# Patient Record
Sex: FEMALE | Race: WHITE | NOT HISPANIC OR LATINO | Employment: OTHER | URBAN - NONMETROPOLITAN AREA
[De-identification: names, ages, dates, MRNs, and addresses within clinical notes are randomized per-mention and may not be internally consistent; named-entity substitution may affect disease eponyms.]

---

## 2017-05-19 ENCOUNTER — TRANSCRIBE ORDERS (OUTPATIENT)
Dept: ADMINISTRATIVE | Facility: HOSPITAL | Age: 62
End: 2017-05-19

## 2017-05-19 ENCOUNTER — HOSPITAL ENCOUNTER (OUTPATIENT)
Dept: GENERAL RADIOLOGY | Facility: HOSPITAL | Age: 62
Discharge: HOME | End: 2017-05-19
Admitting: NURSE PRACTITIONER

## 2017-05-19 DIAGNOSIS — R05.9 COUGH: ICD-10-CM

## 2017-05-19 DIAGNOSIS — R05.9 COUGH: Primary | ICD-10-CM

## 2017-05-19 PROCEDURE — 71020 HC CHEST PA AND LATERAL: CPT

## 2017-05-19 PROCEDURE — 71020 XR CHEST PA AND LATERAL: CPT | Performed by: RADIOLOGY

## 2019-12-11 ENCOUNTER — HOSPITAL ENCOUNTER (INPATIENT)
Facility: HOSPITAL | Age: 64
LOS: 3 days | Discharge: HOME | End: 2019-12-14
Attending: EMERGENCY MEDICINE | Admitting: INTERNAL MEDICINE

## 2019-12-11 ENCOUNTER — APPOINTMENT (OUTPATIENT)
Dept: CT IMAGING | Facility: HOSPITAL | Age: 64
End: 2019-12-11

## 2019-12-11 ENCOUNTER — APPOINTMENT (OUTPATIENT)
Dept: GENERAL RADIOLOGY | Facility: HOSPITAL | Age: 64
End: 2019-12-11

## 2019-12-11 DIAGNOSIS — J18.9 PNEUMONIA OF BOTH LUNGS DUE TO INFECTIOUS ORGANISM, UNSPECIFIED PART OF LUNG: Primary | ICD-10-CM

## 2019-12-11 DIAGNOSIS — J96.01 ACUTE RESPIRATORY FAILURE WITH HYPOXIA (HCC): ICD-10-CM

## 2019-12-11 LAB
A-A DO2: 51.3 MMHG (ref 0–300)
ALBUMIN SERPL-MCNC: 3.65 G/DL (ref 3.5–5.2)
ALBUMIN/GLOB SERPL: 0.7 G/DL
ALP SERPL-CCNC: 130 U/L (ref 39–117)
ALT SERPL W P-5'-P-CCNC: 27 U/L (ref 1–33)
AMYLASE SERPL-CCNC: 14 U/L (ref 28–100)
ANION GAP SERPL CALCULATED.3IONS-SCNC: 18.6 MMOL/L (ref 5–15)
ARTERIAL PATENCY WRIST A: ABNORMAL
AST SERPL-CCNC: 29 U/L (ref 1–32)
ATMOSPHERIC PRESS: 739 MMHG
B PERT DNA SPEC QL NAA+PROBE: NOT DETECTED
BACTERIA UR QL AUTO: ABNORMAL /HPF
BASE EXCESS BLDA CALC-SCNC: 7.9 MMOL/L (ref 0–2)
BASOPHILS # BLD AUTO: 0.02 10*3/MM3 (ref 0–0.2)
BASOPHILS NFR BLD AUTO: 0.2 % (ref 0–1.5)
BDY SITE: ABNORMAL
BILIRUB SERPL-MCNC: 1.2 MG/DL (ref 0.2–1.2)
BILIRUB UR QL STRIP: ABNORMAL
BODY TEMPERATURE: 0 C
BUN BLD-MCNC: 28 MG/DL (ref 8–23)
BUN/CREAT SERPL: 36.4 (ref 7–25)
C PNEUM DNA NPH QL NAA+NON-PROBE: NOT DETECTED
CALCIUM SPEC-SCNC: 9.5 MG/DL (ref 8.6–10.5)
CHLORIDE SERPL-SCNC: 95 MMOL/L (ref 98–107)
CLARITY UR: CLEAR
CO2 BLDA-SCNC: 31.9 MMOL/L (ref 22–33)
CO2 SERPL-SCNC: 27.4 MMOL/L (ref 22–29)
COHGB MFR BLD: 1.5 % (ref 0–5)
COLOR UR: ABNORMAL
CREAT BLD-MCNC: 0.77 MG/DL (ref 0.57–1)
CRP SERPL-MCNC: 11.45 MG/DL (ref 0–0.5)
D-LACTATE SERPL-SCNC: 2 MMOL/L (ref 0.5–2)
DEPRECATED RDW RBC AUTO: 45.5 FL (ref 37–54)
EOSINOPHIL # BLD AUTO: 0.02 10*3/MM3 (ref 0–0.4)
EOSINOPHIL NFR BLD AUTO: 0.2 % (ref 0.3–6.2)
ERYTHROCYTE [DISTWIDTH] IN BLOOD BY AUTOMATED COUNT: 12.8 % (ref 12.3–15.4)
FLUAV AG NPH QL: NEGATIVE
FLUAV H1 2009 PAND RNA NPH QL NAA+PROBE: NOT DETECTED
FLUAV H1 HA GENE NPH QL NAA+PROBE: NOT DETECTED
FLUAV H3 RNA NPH QL NAA+PROBE: NOT DETECTED
FLUAV SUBTYP SPEC NAA+PROBE: NOT DETECTED
FLUBV AG NPH QL IA: NEGATIVE
FLUBV RNA ISLT QL NAA+PROBE: NOT DETECTED
GFR SERPL CREATININE-BSD FRML MDRD: 75 ML/MIN/1.73
GLOBULIN UR ELPH-MCNC: 5.2 GM/DL
GLUCOSE BLD-MCNC: 121 MG/DL (ref 65–99)
GLUCOSE BLDC GLUCOMTR-MCNC: 184 MG/DL (ref 70–130)
GLUCOSE BLDC GLUCOMTR-MCNC: 203 MG/DL (ref 70–130)
GLUCOSE UR STRIP-MCNC: NEGATIVE MG/DL
HADV DNA SPEC NAA+PROBE: NOT DETECTED
HBA1C MFR BLD: 5.6 % (ref 4.8–5.6)
HCO3 BLDA-SCNC: 30.8 MMOL/L (ref 20–26)
HCOV 229E RNA SPEC QL NAA+PROBE: NOT DETECTED
HCOV HKU1 RNA SPEC QL NAA+PROBE: NOT DETECTED
HCOV NL63 RNA SPEC QL NAA+PROBE: NOT DETECTED
HCOV OC43 RNA SPEC QL NAA+PROBE: NOT DETECTED
HCT VFR BLD AUTO: 42.3 % (ref 34–46.6)
HCT VFR BLD CALC: 41.3 % (ref 38–51)
HGB BLD-MCNC: 14 G/DL (ref 12–15.9)
HGB BLDA-MCNC: 13.5 G/DL (ref 13.5–17.5)
HGB UR QL STRIP.AUTO: NEGATIVE
HMPV RNA NPH QL NAA+NON-PROBE: NOT DETECTED
HOLD SPECIMEN: NORMAL
HOLD SPECIMEN: NORMAL
HOROWITZ INDEX BLD+IHG-RTO: 21 %
HPIV1 RNA SPEC QL NAA+PROBE: NOT DETECTED
HPIV2 RNA SPEC QL NAA+PROBE: NOT DETECTED
HPIV3 RNA NPH QL NAA+PROBE: NOT DETECTED
HPIV4 P GENE NPH QL NAA+PROBE: NOT DETECTED
HYALINE CASTS UR QL AUTO: ABNORMAL /LPF
IMM GRANULOCYTES # BLD AUTO: 0.08 10*3/MM3 (ref 0–0.05)
IMM GRANULOCYTES NFR BLD AUTO: 0.8 % (ref 0–0.5)
KETONES UR QL STRIP: NEGATIVE
L PNEUMO1 AG UR QL IA: NEGATIVE
LEUKOCYTE ESTERASE UR QL STRIP.AUTO: ABNORMAL
LIPASE SERPL-CCNC: 11 U/L (ref 13–60)
LYMPHOCYTES # BLD AUTO: 0.97 10*3/MM3 (ref 0.7–3.1)
LYMPHOCYTES NFR BLD AUTO: 9.4 % (ref 19.6–45.3)
Lab: ABNORMAL
Lab: ABNORMAL
M PNEUMO IGG SER IA-ACNC: NOT DETECTED
M PNEUMO IGM SER QL: POSITIVE
MAGNESIUM SERPL-MCNC: 2.3 MG/DL (ref 1.6–2.4)
MCH RBC QN AUTO: 32.3 PG (ref 26.6–33)
MCHC RBC AUTO-ENTMCNC: 33.1 G/DL (ref 31.5–35.7)
MCV RBC AUTO: 97.5 FL (ref 79–97)
METHGB BLD QL: 0.2 % (ref 0–3)
MODALITY: ABNORMAL
MONOCYTES # BLD AUTO: 0.62 10*3/MM3 (ref 0.1–0.9)
MONOCYTES NFR BLD AUTO: 6 % (ref 5–12)
NEUTROPHILS # BLD AUTO: 8.62 10*3/MM3 (ref 1.7–7)
NEUTROPHILS NFR BLD AUTO: 83.4 % (ref 42.7–76)
NITRITE UR QL STRIP: POSITIVE
NOTE: ABNORMAL
NOTIFIED BY: ABNORMAL
NOTIFIED WHO: ABNORMAL
NRBC BLD AUTO-RTO: 0 /100 WBC (ref 0–0.2)
NT-PROBNP SERPL-MCNC: 231.2 PG/ML (ref 5–900)
OXYHGB MFR BLDV: 88.7 % (ref 94–99)
PCO2 BLDA: 36 MM HG (ref 35–45)
PCO2 TEMP ADJ BLD: ABNORMAL MM[HG]
PH BLDA: 7.54 PH UNITS (ref 7.35–7.45)
PH UR STRIP.AUTO: 6 [PH] (ref 5–8)
PH, TEMP CORRECTED: ABNORMAL
PLATELET # BLD AUTO: 362 10*3/MM3 (ref 140–450)
PMV BLD AUTO: 10.6 FL (ref 6–12)
PO2 BLDA: 53.2 MM HG (ref 83–108)
PO2 TEMP ADJ BLD: ABNORMAL MM[HG]
POTASSIUM BLD-SCNC: 2.9 MMOL/L (ref 3.5–5.2)
PROT SERPL-MCNC: 8.8 G/DL (ref 6–8.5)
PROT UR QL STRIP: ABNORMAL
RBC # BLD AUTO: 4.34 10*6/MM3 (ref 3.77–5.28)
RBC # UR: ABNORMAL /HPF
REF LAB TEST METHOD: ABNORMAL
RHINOVIRUS RNA SPEC NAA+PROBE: NOT DETECTED
RSV RNA NPH QL NAA+NON-PROBE: NOT DETECTED
S PNEUM AG SPEC QL LA: NEGATIVE
SAO2 % BLDCOA: 90.2 % (ref 94–99)
SODIUM BLD-SCNC: 141 MMOL/L (ref 136–145)
SP GR UR STRIP: 1.02 (ref 1–1.03)
SQUAMOUS #/AREA URNS HPF: ABNORMAL /HPF
T4 FREE SERPL-MCNC: 1.41 NG/DL (ref 0.93–1.7)
TROPONIN T SERPL-MCNC: <0.01 NG/ML (ref 0–0.03)
TSH SERPL DL<=0.05 MIU/L-ACNC: 1.29 UIU/ML (ref 0.27–4.2)
UROBILINOGEN UR QL STRIP: ABNORMAL
VENTILATOR MODE: ABNORMAL
WBC NRBC COR # BLD: 10.33 10*3/MM3 (ref 3.4–10.8)
WBC UR QL AUTO: ABNORMAL /HPF
WHOLE BLOOD HOLD SPECIMEN: NORMAL
WHOLE BLOOD HOLD SPECIMEN: NORMAL

## 2019-12-11 PROCEDURE — 93005 ELECTROCARDIOGRAM TRACING: CPT | Performed by: EMERGENCY MEDICINE

## 2019-12-11 PROCEDURE — 0 IOVERSOL 68 % SOLUTION: Performed by: EMERGENCY MEDICINE

## 2019-12-11 PROCEDURE — P9612 CATHETERIZE FOR URINE SPEC: HCPCS

## 2019-12-11 PROCEDURE — 25010000002 METHYLPREDNISOLONE PER 125 MG: Performed by: EMERGENCY MEDICINE

## 2019-12-11 PROCEDURE — 86738 MYCOPLASMA ANTIBODY: CPT | Performed by: NURSE PRACTITIONER

## 2019-12-11 PROCEDURE — 80053 COMPREHEN METABOLIC PANEL: CPT | Performed by: EMERGENCY MEDICINE

## 2019-12-11 PROCEDURE — 87899 AGENT NOS ASSAY W/OPTIC: CPT | Performed by: NURSE PRACTITIONER

## 2019-12-11 PROCEDURE — 94799 UNLISTED PULMONARY SVC/PX: CPT

## 2019-12-11 PROCEDURE — 94640 AIRWAY INHALATION TREATMENT: CPT

## 2019-12-11 PROCEDURE — 74022 RADEX COMPL AQT ABD SERIES: CPT

## 2019-12-11 PROCEDURE — 83036 HEMOGLOBIN GLYCOSYLATED A1C: CPT | Performed by: NURSE PRACTITIONER

## 2019-12-11 PROCEDURE — 86140 C-REACTIVE PROTEIN: CPT | Performed by: EMERGENCY MEDICINE

## 2019-12-11 PROCEDURE — 87205 SMEAR GRAM STAIN: CPT | Performed by: NURSE PRACTITIONER

## 2019-12-11 PROCEDURE — 25010000002 HEPARIN (PORCINE) PER 1000 UNITS: Performed by: INTERNAL MEDICINE

## 2019-12-11 PROCEDURE — 83690 ASSAY OF LIPASE: CPT | Performed by: EMERGENCY MEDICINE

## 2019-12-11 PROCEDURE — 84439 ASSAY OF FREE THYROXINE: CPT | Performed by: EMERGENCY MEDICINE

## 2019-12-11 PROCEDURE — 83880 ASSAY OF NATRIURETIC PEPTIDE: CPT | Performed by: EMERGENCY MEDICINE

## 2019-12-11 PROCEDURE — 99223 1ST HOSP IP/OBS HIGH 75: CPT | Performed by: INTERNAL MEDICINE

## 2019-12-11 PROCEDURE — 87040 BLOOD CULTURE FOR BACTERIA: CPT | Performed by: EMERGENCY MEDICINE

## 2019-12-11 PROCEDURE — 99285 EMERGENCY DEPT VISIT HI MDM: CPT

## 2019-12-11 PROCEDURE — 36600 WITHDRAWAL OF ARTERIAL BLOOD: CPT

## 2019-12-11 PROCEDURE — 71275 CT ANGIOGRAPHY CHEST: CPT | Performed by: RADIOLOGY

## 2019-12-11 PROCEDURE — 71275 CT ANGIOGRAPHY CHEST: CPT

## 2019-12-11 PROCEDURE — 83605 ASSAY OF LACTIC ACID: CPT | Performed by: EMERGENCY MEDICINE

## 2019-12-11 PROCEDURE — 84484 ASSAY OF TROPONIN QUANT: CPT | Performed by: EMERGENCY MEDICINE

## 2019-12-11 PROCEDURE — 25010000002 METHYLPREDNISOLONE PER 40 MG: Performed by: NURSE PRACTITIONER

## 2019-12-11 PROCEDURE — 81001 URINALYSIS AUTO W/SCOPE: CPT | Performed by: EMERGENCY MEDICINE

## 2019-12-11 PROCEDURE — 82962 GLUCOSE BLOOD TEST: CPT

## 2019-12-11 PROCEDURE — 82375 ASSAY CARBOXYHB QUANT: CPT

## 2019-12-11 PROCEDURE — 93010 ELECTROCARDIOGRAM REPORT: CPT | Performed by: INTERNAL MEDICINE

## 2019-12-11 PROCEDURE — 87070 CULTURE OTHR SPECIMN AEROBIC: CPT | Performed by: NURSE PRACTITIONER

## 2019-12-11 PROCEDURE — 85025 COMPLETE CBC W/AUTO DIFF WBC: CPT | Performed by: EMERGENCY MEDICINE

## 2019-12-11 PROCEDURE — 83050 HGB METHEMOGLOBIN QUAN: CPT

## 2019-12-11 PROCEDURE — 84443 ASSAY THYROID STIM HORMONE: CPT | Performed by: EMERGENCY MEDICINE

## 2019-12-11 PROCEDURE — 82150 ASSAY OF AMYLASE: CPT | Performed by: EMERGENCY MEDICINE

## 2019-12-11 PROCEDURE — 74022 RADEX COMPL AQT ABD SERIES: CPT | Performed by: RADIOLOGY

## 2019-12-11 PROCEDURE — 82805 BLOOD GASES W/O2 SATURATION: CPT

## 2019-12-11 PROCEDURE — 83735 ASSAY OF MAGNESIUM: CPT | Performed by: EMERGENCY MEDICINE

## 2019-12-11 PROCEDURE — 87804 INFLUENZA ASSAY W/OPTIC: CPT | Performed by: EMERGENCY MEDICINE

## 2019-12-11 PROCEDURE — 0099U HC BIOFIRE FILMARRAY RESP PANEL 1: CPT | Performed by: NURSE PRACTITIONER

## 2019-12-11 PROCEDURE — 25010000002 CEFTRIAXONE: Performed by: EMERGENCY MEDICINE

## 2019-12-11 RX ORDER — ROSUVASTATIN CALCIUM 20 MG/1
40 TABLET, COATED ORAL EVERY OTHER DAY
Status: CANCELLED | OUTPATIENT
Start: 2019-12-11

## 2019-12-11 RX ORDER — SODIUM CHLORIDE 0.9 % (FLUSH) 0.9 %
10 SYRINGE (ML) INJECTION AS NEEDED
Status: DISCONTINUED | OUTPATIENT
Start: 2019-12-11 | End: 2019-12-14 | Stop reason: HOSPADM

## 2019-12-11 RX ORDER — POTASSIUM CHLORIDE 20 MEQ/1
40 TABLET, EXTENDED RELEASE ORAL ONCE
Status: COMPLETED | OUTPATIENT
Start: 2019-12-11 | End: 2019-12-11

## 2019-12-11 RX ORDER — UBIDECARENONE 100 MG
100 CAPSULE ORAL EVERY OTHER DAY
COMMUNITY

## 2019-12-11 RX ORDER — LANOLIN ALCOHOL/MO/W.PET/CERES
400 CREAM (GRAM) TOPICAL EVERY OTHER DAY
Status: DISCONTINUED | OUTPATIENT
Start: 2019-12-11 | End: 2019-12-14 | Stop reason: HOSPADM

## 2019-12-11 RX ORDER — NICOTINE 21 MG/24HR
1 PATCH, TRANSDERMAL 24 HOURS TRANSDERMAL
Status: DISCONTINUED | OUTPATIENT
Start: 2019-12-11 | End: 2019-12-14 | Stop reason: HOSPADM

## 2019-12-11 RX ORDER — POTASSIUM CHLORIDE 750 MG/1
40 TABLET, FILM COATED, EXTENDED RELEASE ORAL AS NEEDED
Status: DISCONTINUED | OUTPATIENT
Start: 2019-12-11 | End: 2019-12-14 | Stop reason: HOSPADM

## 2019-12-11 RX ORDER — IPRATROPIUM BROMIDE AND ALBUTEROL SULFATE 2.5; .5 MG/3ML; MG/3ML
3 SOLUTION RESPIRATORY (INHALATION)
Status: DISCONTINUED | OUTPATIENT
Start: 2019-12-11 | End: 2019-12-14 | Stop reason: HOSPADM

## 2019-12-11 RX ORDER — NITROGLYCERIN 0.4 MG/1
0.4 TABLET SUBLINGUAL
Status: DISCONTINUED | OUTPATIENT
Start: 2019-12-11 | End: 2019-12-14 | Stop reason: HOSPADM

## 2019-12-11 RX ORDER — ASPIRIN AND DIPYRIDAMOLE 25; 200 MG/1; MG/1
1 CAPSULE, EXTENDED RELEASE ORAL 2 TIMES DAILY
Status: DISCONTINUED | OUTPATIENT
Start: 2019-12-11 | End: 2019-12-14 | Stop reason: HOSPADM

## 2019-12-11 RX ORDER — POTASSIUM CHLORIDE 1.5 G/1.77G
40 POWDER, FOR SOLUTION ORAL ONCE
Status: DISCONTINUED | OUTPATIENT
Start: 2019-12-11 | End: 2019-12-11

## 2019-12-11 RX ORDER — POTASSIUM CHLORIDE 1.5 G/1.77G
40 POWDER, FOR SOLUTION ORAL AS NEEDED
Status: DISCONTINUED | OUTPATIENT
Start: 2019-12-11 | End: 2019-12-14 | Stop reason: HOSPADM

## 2019-12-11 RX ORDER — NICOTINE POLACRILEX 4 MG
15 LOZENGE BUCCAL
Status: DISCONTINUED | OUTPATIENT
Start: 2019-12-11 | End: 2019-12-14 | Stop reason: HOSPADM

## 2019-12-11 RX ORDER — POTASSIUM CHLORIDE 7.45 MG/ML
10 INJECTION INTRAVENOUS
Status: DISCONTINUED | OUTPATIENT
Start: 2019-12-11 | End: 2019-12-14 | Stop reason: HOSPADM

## 2019-12-11 RX ORDER — ROSUVASTATIN CALCIUM 40 MG/1
40 TABLET, COATED ORAL EVERY OTHER DAY
COMMUNITY

## 2019-12-11 RX ORDER — POTASSIUM CHLORIDE 20 MEQ/1
40 TABLET, EXTENDED RELEASE ORAL EVERY 4 HOURS
Status: COMPLETED | OUTPATIENT
Start: 2019-12-11 | End: 2019-12-11

## 2019-12-11 RX ORDER — METHYLPREDNISOLONE SODIUM SUCCINATE 40 MG/ML
40 INJECTION, POWDER, LYOPHILIZED, FOR SOLUTION INTRAMUSCULAR; INTRAVENOUS EVERY 12 HOURS
Status: DISCONTINUED | OUTPATIENT
Start: 2019-12-11 | End: 2019-12-13

## 2019-12-11 RX ORDER — GUAIFENESIN 600 MG/1
600 TABLET, EXTENDED RELEASE ORAL EVERY 12 HOURS SCHEDULED
Status: DISCONTINUED | OUTPATIENT
Start: 2019-12-11 | End: 2019-12-14 | Stop reason: HOSPADM

## 2019-12-11 RX ORDER — METHYLPREDNISOLONE SODIUM SUCCINATE 125 MG/2ML
125 INJECTION, POWDER, LYOPHILIZED, FOR SOLUTION INTRAMUSCULAR; INTRAVENOUS ONCE
Status: COMPLETED | OUTPATIENT
Start: 2019-12-11 | End: 2019-12-11

## 2019-12-11 RX ORDER — IPRATROPIUM BROMIDE AND ALBUTEROL SULFATE 2.5; .5 MG/3ML; MG/3ML
3 SOLUTION RESPIRATORY (INHALATION) EVERY 4 HOURS PRN
Status: DISCONTINUED | OUTPATIENT
Start: 2019-12-11 | End: 2019-12-14 | Stop reason: HOSPADM

## 2019-12-11 RX ORDER — IPRATROPIUM BROMIDE AND ALBUTEROL SULFATE 2.5; .5 MG/3ML; MG/3ML
3 SOLUTION RESPIRATORY (INHALATION)
Status: COMPLETED | OUTPATIENT
Start: 2019-12-11 | End: 2019-12-11

## 2019-12-11 RX ORDER — HEPARIN SODIUM 5000 [USP'U]/ML
5000 INJECTION, SOLUTION INTRAVENOUS; SUBCUTANEOUS EVERY 8 HOURS SCHEDULED
Status: DISCONTINUED | OUTPATIENT
Start: 2019-12-11 | End: 2019-12-14 | Stop reason: HOSPADM

## 2019-12-11 RX ORDER — SODIUM CHLORIDE 0.9 % (FLUSH) 0.9 %
10 SYRINGE (ML) INJECTION EVERY 12 HOURS SCHEDULED
Status: DISCONTINUED | OUTPATIENT
Start: 2019-12-11 | End: 2019-12-14 | Stop reason: HOSPADM

## 2019-12-11 RX ORDER — PANTOPRAZOLE SODIUM 40 MG/1
40 TABLET, DELAYED RELEASE ORAL
Status: DISCONTINUED | OUTPATIENT
Start: 2019-12-11 | End: 2019-12-14 | Stop reason: HOSPADM

## 2019-12-11 RX ORDER — DEXTROSE MONOHYDRATE 25 G/50ML
25 INJECTION, SOLUTION INTRAVENOUS
Status: DISCONTINUED | OUTPATIENT
Start: 2019-12-11 | End: 2019-12-14 | Stop reason: HOSPADM

## 2019-12-11 RX ADMIN — METHYLPREDNISOLONE SODIUM SUCCINATE 125 MG: 125 INJECTION, POWDER, FOR SOLUTION INTRAMUSCULAR; INTRAVENOUS at 10:00

## 2019-12-11 RX ADMIN — DOXYCYCLINE 100 MG: 100 INJECTION, POWDER, LYOPHILIZED, FOR SOLUTION INTRAVENOUS at 12:53

## 2019-12-11 RX ADMIN — POTASSIUM CHLORIDE 40 MEQ: 1500 TABLET, EXTENDED RELEASE ORAL at 21:34

## 2019-12-11 RX ADMIN — IOVERSOL 100 ML: 678 INJECTION INTRA-ARTERIAL; INTRAVENOUS at 14:05

## 2019-12-11 RX ADMIN — METHYLPREDNISOLONE SODIUM SUCCINATE 40 MG: 40 INJECTION, POWDER, FOR SOLUTION INTRAMUSCULAR; INTRAVENOUS at 17:07

## 2019-12-11 RX ADMIN — IPRATROPIUM BROMIDE AND ALBUTEROL SULFATE 3 ML: 2.5; .5 SOLUTION RESPIRATORY (INHALATION) at 09:39

## 2019-12-11 RX ADMIN — IPRATROPIUM BROMIDE AND ALBUTEROL SULFATE 3 ML: 2.5; .5 SOLUTION RESPIRATORY (INHALATION) at 10:07

## 2019-12-11 RX ADMIN — HEPARIN SODIUM 5000 UNITS: 5000 INJECTION, SOLUTION INTRAVENOUS; SUBCUTANEOUS at 17:07

## 2019-12-11 RX ADMIN — IPRATROPIUM BROMIDE AND ALBUTEROL SULFATE 3 ML: 2.5; .5 SOLUTION RESPIRATORY (INHALATION) at 18:23

## 2019-12-11 RX ADMIN — POTASSIUM CHLORIDE 40 MEQ: 1500 TABLET, EXTENDED RELEASE ORAL at 17:07

## 2019-12-11 RX ADMIN — CEFTRIAXONE 2 G: 2 INJECTION, POWDER, FOR SOLUTION INTRAMUSCULAR; INTRAVENOUS at 12:16

## 2019-12-11 RX ADMIN — POTASSIUM CHLORIDE 40 MEQ: 1500 TABLET, EXTENDED RELEASE ORAL at 12:23

## 2019-12-11 RX ADMIN — GUAIFENESIN 600 MG: 600 TABLET, EXTENDED RELEASE ORAL at 21:34

## 2019-12-11 RX ADMIN — ASPIRIN AND EXTENDED-RELEASE DIPYRIDAMOLE 1 CAPSULE: 25; 200 CAPSULE ORAL at 21:34

## 2019-12-11 NOTE — ED PROVIDER NOTES
"Subjective   Patient is a 64-year-old female who presents complaining of a 2-week history of difficulty in breathing, shortness of breath, cough.  She reports that she is always short of breath, ever since she had pneumonia a few years ago, but is been worse for the past 2 weeks.  She reports that over the last 4 days she has had nausea, vomiting, dry heaves.  She states that she feels \"sore\" in her abdomen diffusely, states that it feels like this is from coughing so much.  Her cough has been productive of thick, green/yellow/brown sputum.  She denies fever, chills, chest pain, hemoptysis, hematemesis, hematochezia or melena, syncope or near syncope, focal numbness or weakness, other symptoms or other complaints.  Patient reports that she is a longtime smoker, but she quit 2 weeks ago when she had the onset of this illness.          Review of Systems   Constitutional: Negative for chills, diaphoresis and fever.   HENT: Negative for ear pain, sore throat and trouble swallowing.    Eyes: Negative for photophobia and pain.   Respiratory: Positive for cough, shortness of breath and wheezing.    Cardiovascular: Negative for chest pain and palpitations.   Gastrointestinal: Positive for nausea and vomiting. Negative for abdominal distention, blood in stool and diarrhea.   Endocrine: Negative for polydipsia and polyphagia.   Genitourinary: Negative for difficulty urinating and flank pain.   Musculoskeletal: Negative for back pain, neck pain and neck stiffness.   Skin: Negative for color change and pallor.   Neurological: Negative for seizures, syncope and speech difficulty.   Psychiatric/Behavioral: Negative for confusion.   All other systems reviewed and are negative.      Past Medical History:   Diagnosis Date   • Stroke (CMS/[facility])        Allergies   Allergen Reactions   • Codeine    • Latex        No past surgical history on file.    No family history on file.    Social History     Socioeconomic History   • Marital " status:      Spouse name: Not on file   • Number of children: Not on file   • Years of education: Not on file   • Highest education level: Not on file   Tobacco Use   • Smoking status: Current Every Day Smoker     Packs/day: 1.00     Types: Cigarettes   Substance and Sexual Activity   • Alcohol use: No   • Drug use: No   • Sexual activity: Defer           Objective   Physical Exam   Constitutional: She is oriented to person, place, and time. She appears distressed.   A thin and chronically ill-appearing white female who appears moderately dyspneic.   HENT:   Head: Normocephalic and atraumatic.   Eyes: Pupils are equal, round, and reactive to light. EOM are normal. No scleral icterus.   Neck: Normal range of motion. Neck supple. No neck rigidity. No tracheal deviation present.   Cardiovascular: Regular rhythm and intact distal pulses.   Pulmonary/Chest: She has wheezes. She has rhonchi. She exhibits no tenderness.   Respirations moderately labored.  She speaks in short sentences.  Breath sounds are somewhat diminished throughout.  Expiratory wheezes throughout.  Scattered rhonchi.  Cough is very bronchospastic.   Abdominal: Soft. Bowel sounds are normal. There is no tenderness. There is no rebound and no guarding.   Musculoskeletal: Normal range of motion. She exhibits no tenderness.        Right lower leg: She exhibits no tenderness and no edema.        Left lower leg: She exhibits no tenderness and no edema.   Neurological: She is alert and oriented to person, place, and time. She has normal strength. No sensory deficit. She exhibits normal muscle tone. Coordination normal. GCS eye subscore is 4. GCS verbal subscore is 5. GCS motor subscore is 6.   Skin: Skin is warm and dry. Capillary refill takes less than 2 seconds. No cyanosis. No pallor.   Psychiatric: She has a normal mood and affect. Her behavior is normal.   Nursing note and vitals reviewed.      Procedures  EKG shows sinus tachycardia with a rate  of 111.  Nonspecific ST abnormality.  No apparent acute ischemia.  There is much baseline artifact.  XR Abdomen 2 View With Chest 1 View   Final Result   LEFT UPPER LOBE AIRSPACE DISEASE. PATCHY BIBASILAR AIRSPACE   DISEASE AND FIBROSIS.        This report was finalized on 12/11/2019 10:22 AM by Dr. Dougie Horton MD.          CT Chest Pulmonary Embolism    (Results Pending)     Results for orders placed or performed during the hospital encounter of 12/11/19   Influenza Antigen, Rapid - Swab, Nasopharynx   Result Value Ref Range    Influenza A Ag, EIA Negative Negative    Influenza B Ag, EIA Negative Negative   Comprehensive Metabolic Panel   Result Value Ref Range    Glucose 121 (H) 65 - 99 mg/dL    BUN 28 (H) 8 - 23 mg/dL    Creatinine 0.77 0.57 - 1.00 mg/dL    Sodium 141 136 - 145 mmol/L    Potassium 2.9 (L) 3.5 - 5.2 mmol/L    Chloride 95 (L) 98 - 107 mmol/L    CO2 27.4 22.0 - 29.0 mmol/L    Calcium 9.5 8.6 - 10.5 mg/dL    Total Protein 8.8 (H) 6.0 - 8.5 g/dL    Albumin 3.65 3.50 - 5.20 g/dL    ALT (SGPT) 27 1 - 33 U/L    AST (SGOT) 29 1 - 32 U/L    Alkaline Phosphatase 130 (H) 39 - 117 U/L    Total Bilirubin 1.2 0.2 - 1.2 mg/dL    eGFR Non African Amer 75 >60 mL/min/1.73    Globulin 5.2 gm/dL    A/G Ratio 0.7 g/dL    BUN/Creatinine Ratio 36.4 (H) 7.0 - 25.0    Anion Gap 18.6 (H) 5.0 - 15.0 mmol/L   Lipase   Result Value Ref Range    Lipase 11 (L) 13 - 60 U/L   Amylase   Result Value Ref Range    Amylase 14 (L) 28 - 100 U/L   Urinalysis With Microscopic If Indicated (No Culture) - Urine, Clean Catch   Result Value Ref Range    Color, UA Dark Yellow (A) Yellow, Straw    Appearance, UA Clear Clear    pH, UA 6.0 5.0 - 8.0    Specific Gravity, UA 1.020 1.005 - 1.030    Glucose, UA Negative Negative    Ketones, UA Negative Negative    Bilirubin, UA Small (1+) (A) Negative    Blood, UA Negative Negative    Protein,  mg/dL (2+) (A) Negative    Leuk Esterase, UA Trace (A) Negative    Nitrite, UA Positive (A)  Negative    Urobilinogen, UA 2.0 E.U./dL (A) 0.2 - 1.0 E.U./dL   BNP   Result Value Ref Range    proBNP 231.2 5.0 - 900.0 pg/mL   Troponin   Result Value Ref Range    Troponin T <0.010 0.000 - 0.030 ng/mL   Lactic Acid, Plasma   Result Value Ref Range    Lactate 2.0 0.5 - 2.0 mmol/L   C-reactive Protein   Result Value Ref Range    C-Reactive Protein 11.45 (H) 0.00 - 0.50 mg/dL   T4, Free   Result Value Ref Range    Free T4 1.41 0.93 - 1.70 ng/dL   TSH   Result Value Ref Range    TSH 1.290 0.270 - 4.200 uIU/mL   Magnesium   Result Value Ref Range    Magnesium 2.3 1.6 - 2.4 mg/dL   CBC Auto Differential   Result Value Ref Range    WBC 10.33 3.40 - 10.80 10*3/mm3    RBC 4.34 3.77 - 5.28 10*6/mm3    Hemoglobin 14.0 12.0 - 15.9 g/dL    Hematocrit 42.3 34.0 - 46.6 %    MCV 97.5 (H) 79.0 - 97.0 fL    MCH 32.3 26.6 - 33.0 pg    MCHC 33.1 31.5 - 35.7 g/dL    RDW 12.8 12.3 - 15.4 %    RDW-SD 45.5 37.0 - 54.0 fl    MPV 10.6 6.0 - 12.0 fL    Platelets 362 140 - 450 10*3/mm3    Neutrophil % 83.4 (H) 42.7 - 76.0 %    Lymphocyte % 9.4 (L) 19.6 - 45.3 %    Monocyte % 6.0 5.0 - 12.0 %    Eosinophil % 0.2 (L) 0.3 - 6.2 %    Basophil % 0.2 0.0 - 1.5 %    Immature Grans % 0.8 (H) 0.0 - 0.5 %    Neutrophils, Absolute 8.62 (H) 1.70 - 7.00 10*3/mm3    Lymphocytes, Absolute 0.97 0.70 - 3.10 10*3/mm3    Monocytes, Absolute 0.62 0.10 - 0.90 10*3/mm3    Eosinophils, Absolute 0.02 0.00 - 0.40 10*3/mm3    Basophils, Absolute 0.02 0.00 - 0.20 10*3/mm3    Immature Grans, Absolute 0.08 (H) 0.00 - 0.05 10*3/mm3    nRBC 0.0 0.0 - 0.2 /100 WBC   Blood Gas, Arterial With Co-Ox   Result Value Ref Range    Site Left Brachial     Norman's Test N/A     pH, Arterial 7.540 (H) 7.350 - 7.450 pH units    pCO2, Arterial 36.0 35.0 - 45.0 mm Hg    pO2, Arterial 53.2 (C) 83.0 - 108.0 mm Hg    HCO3, Arterial 30.8 (H) 20.0 - 26.0 mmol/L    Base Excess, Arterial 7.9 (H) 0.0 - 2.0 mmol/L    O2 Saturation, Arterial 90.2 (L) 94.0 - 99.0 %    Hemoglobin, Blood Gas 13.5  13.5 - 17.5 g/dL    Hematocrit, Blood Gas 41.3 38.0 - 51.0 %    Oxyhemoglobin 88.7 (L) 94 - 99 %    Methemoglobin 0.20 0.00 - 3.00 %    Carboxyhemoglobin 1.5 0 - 5 %    A-a Gradiant 51.3 0.0 - 300.0 mmHg    CO2 Content 31.9 22 - 33 mmol/L    Temperature 0.0 C    Barometric Pressure for Blood Gas 739 mmHg    Modality Room Air     FIO2 21 %    Ventilator Mode NA     Note      Notified Who DR MONTAGUE AND TERRY RN, ER     Notified By [id number]     Notified Time 12/11/2019 09:47     Collected by [id number]     pH, Temp Corrected      pCO2, Temperature Corrected      pO2, Temperature Corrected     Urinalysis, Microscopic Only - Urine, Clean Catch   Result Value Ref Range    RBC, UA 0-2 None Seen, 0-2 /HPF    WBC, UA 0-2 None Seen, 0-2 /HPF    Bacteria, UA Trace (A) None Seen /HPF    Squamous Epithelial Cells, UA 7-12 (A) None Seen, 0-2 /HPF    Hyaline Casts, UA 3-6 None Seen /LPF    Methodology Manual Light Microscopy    Light Blue Top   Result Value Ref Range    Extra Tube hold for add-on    Green Top (Gel)   Result Value Ref Range    Extra Tube Hold for add-ons.    Lavender Top   Result Value Ref Range    Extra Tube hold for add-on    Gold Top - SST   Result Value Ref Range    Extra Tube Hold for add-ons.                 ED Course  ED Course as of Dec 11 1313   Wed Dec 11, 2019   1232 Patient is improved.  Case discussed with Dr. Smith.  He is admitting patient to the hospitalist service, advises he will place orders.  He wants me to get a CT chest PE protocol prior to her coming upstairs.    [CM]      ED Course User Index  [CM] Jay Montague MD                           [facility]    Final diagnoses:   Pneumonia of both lungs due to infectious organism, unspecified part of lung               Please note that portions of this note were completed with a voice recognition program.        Jay Montague MD  12/11/19 [id number]

## 2019-12-11 NOTE — NURSING NOTE
Daughter called for update on mother, password verified, update given.  Daughter told that patient will be moving to [phone], verbalizes understanding.

## 2019-12-11 NOTE — PLAN OF CARE
Problem: Patient Care Overview  Goal: Plan of Care Review  Outcome: Ongoing (interventions implemented as appropriate)  Flowsheets (Taken 12/11/2019 1505)  Progress: no change  Plan of Care Reviewed With: patient; daughter  Note:   Patient new admit. Exhibits pursed lip breathing and dyspnea on exertion as well as at rest.

## 2019-12-11 NOTE — H&P
[facility] Medicine Services  History & Physical          Patient Identification:  Name:  Florinda Barr  Age:  64 y.o.  Sex:  female  :  1955  MRN:  [id number]   Visit Number:  [id number]  Primary Care Physician:  Paulina Rod MD    I have seen the patient in conjunction with CARMELO Chris and I agree with the following statements:     Subjective     Chief complaint: cough, weakness    History of presenting illness:    Mrs. Barr is a 64 year old female patient who presented to [facility] ED on 19. She states she has been sick the last two weeks, she has taken several over the counter meds, she thought she had the flu. She states she has had worsening dyspnea even before her acute illness she was already feeling weak. She Her PCP took her off lipitor due to her leg cramps and weakness. She states that over the last two weeks while she's been ill she can't eat, sleep, she has had a productive cough, yellow/brown/green. She doesn't know if she has had fever. She reports nausea, no vomiting or diarrhea. She denies any known sick contacts, no travel and no recent hospitalizations. She does not wear oxygen at home.     Her work up in the ED showed a arterial pH of 7.540, CO2 36.0, PO2 53.2, bicarb 30.8, O2 saturation 90.2, this was obtained on room air.  Initial troponin T was negative, proBNP 231.  Glucose 121, sodium 141, potassium 2.9, bicarb 27.4, chloride 95, anion gap 18.6, creatinine 0.77, BUN 28, TSH 1.290, lactate 2.0, WBC 10.33, CRP 11.45, H&H 14.0 and 42.3.  Influenza was negative, urine does not appreciate any concern for UTI.  Blood cultures were collected in the ED.  Chest x-ray showed left upper lobe airspace disease, patchy bibasilar airspace disease and fibrosis per radiology reading.    Her past medical history is significant for hyperlipidemia, Stroke (17 years ago). She denies any history of blood clots, cancer, HTN, DM, thyroid problems, no GI or  intracranial bleeding, no stents in her heart. She does smoke about 1 ppd, she has smoked around 48 years. She denies ETOH or drug use. She states she hasn't hardly smoked in 2 weeks since being ill. She lives alone, her next of kin is Tyra Romano, her daughter.    (AP)  Ms Barr is 64F smoker, underweight PMH HLD, CVA, no other reported PMH but does have Ventolin inhaler in her purse, presented to  [facility] after 1-2wk hx of worsening SOA, productive cough (reports yellow, green, brown sputum), and generalize weakness.  She was seen by her PCP early on in illness and did not receive any Abx.  She reports she hasn't smoked for last 2 weeks while she has been SOA.  She denies any formal workup or diagnosis of COPD.  She had CTPE in ER and was negative but did show multifocal PNA, now on IV Abx and steroids.  She reports she is still feeling short of breath but that it is slightly better now that on oxygen.  ---------------------------------------------------------------------------------------------------------------------   Review of Systems   Constitutional: Positive for fatigue. Negative for chills, diaphoresis and fever.   HENT: Negative for congestion and rhinorrhea.    Respiratory: Positive for cough and shortness of breath. Negative for wheezing.    Cardiovascular: Negative for chest pain and leg swelling.   Gastrointestinal: Negative for abdominal distention, constipation, diarrhea, nausea and vomiting.   Endocrine: Negative for cold intolerance and heat intolerance.   Genitourinary: Negative for difficulty urinating.   Musculoskeletal: Negative for arthralgias and myalgias.   Skin: Negative for color change, pallor and rash.   Allergic/Immunologic: Negative for environmental allergies.   Neurological: Negative for dizziness, weakness and light-headedness.   Hematological: Negative for adenopathy.   Psychiatric/Behavioral: Negative for agitation, behavioral problems and confusion.       ---------------------------------------------------------------------------------------------------------------------   Past Medical History:   Diagnosis Date   • Stroke (CMS/[facility])      No past surgical history on file.  No family history on file.  Social History     Socioeconomic History   • Marital status:      Spouse name: Not on file   • Number of children: Not on file   • Years of education: Not on file   • Highest education level: Not on file   Tobacco Use   • Smoking status: Current Every Day Smoker     Packs/day: 1.00     Types: Cigarettes   Substance and Sexual Activity   • Alcohol use: No   • Drug use: No   • Sexual activity: Defer     ---------------------------------------------------------------------------------------------------------------------   Allergies:  Codeine and Latex  ---------------------------------------------------------------------------------------------------------------------   Medications below are reported home medications pulling from within the system; at this time, these medications have not been reconciled unless otherwise specified and are in the verification process for further verifcation as current home medications.    Prior to Admission Medications     Prescriptions Last Dose Informant Patient Reported? Taking?    dipyridamole-aspirin (AGGRENOX)  MG per 12 hr capsule   Yes No    Take 1 capsule by mouth 2 (two) times a day.    folic acid (FOLVITE) 400 MCG tablet   Yes No    Take 400 mcg by mouth daily.    naproxen (NAPROSYN) 500 MG tablet   No No    Take 1 tablet by mouth 2 (two) times a day as needed for mild pain (1-3).    rosuvastatin (CRESTOR) 20 MG tablet   Yes No    Take 20 mg by mouth daily.        Hospital Scheduled Meds:    cefTRIAXone 2 g Intravenous Once   doxycycline 100 mg Intravenous Once     ---------------------------------------------------------------------------------------------------------------------   Objective     Vital Signs:  Temp:   [98.4 °F (36.9 °C)] 98.4 °F (36.9 °C)  Heart Rate:  [108-121] 108  Resp:  [20-27] 20  BP: ()/(58-90) 102/58      12/11/19  0908   Weight: 40.8 kg (90 lb)     Body mass index is 17.01 kg/m².  ---------------------------------------------------------------------------------------------------------------------   Physical Exam    Physical Exam:  Constitutional:  Thin pleasant female with 2 LNC in place.    HENT:  Head: Normocephalic and atraumatic.  Mouth:  Moist mucous membranes.    Eyes:  Pupils are equal, round, and reactive to light.  No scleral icterus.  Neck:  Neck supple.  No JVD present.    Cardiovascular:  Normal rate, regular rhythm.  with no murmur.  Pulmonary/Chest:  No respiratory distress, pursed lip breathing at times when talking, diminished throughout, wet cough on exam  Abdominal:  Soft.  Bowel sounds are present.  No distension and no tenderness.   Musculoskeletal:  No edema, no tenderness, and no deformity.  No red or swollen joints anywhere.    Neurological:  Alert and oriented to person, place, and time.  No tongue deviation.  No facial droop.  No slurred speech.   Skin:  Skin is warm and dry.  No rash noted.  No pallor.   Psychiatric:  Normal mood and affect.  Behavior is normal.  Judgment and thought content normal.   Peripheral vascular:  No edema and strong pulses on all 4 extremities.    (AP) Exam  General: thin but not cachectic, mild acute distress  CV: tachycardic, sinus rhythm, no MRG  Pulm: decreased BS b/l, b/l bibasilar expiratory wheezing, pursed lips when breathing in mild resp distress  Abd: Soft, nontender, nondistended  MSK; No gross abnormalities, good muscle tone  Neuro: Alert and oriented, no gross focal deficits or sequela of previous stoke   Psych: appropriate mood and affect  Extremities: no edema  ---------------------------------------------------------------------------------------------------------------------  EKG:           ---------------------------------------------------------------------------------------------------------------------   Results from last 7 days   Lab Units 12/11/19  0929   CRP mg/dL 11.45*   LACTATE mmol/L 2.0   WBC 10*3/mm3 10.33   HEMOGLOBIN g/dL 14.0   HEMATOCRIT % 42.3   MCV fL 97.5*   MCHC g/dL 33.1   PLATELETS 10*3/mm3 362     Results from last 7 days   Lab Units 12/11/19  0938   PH, ARTERIAL pH units 7.540*   PO2 ART mm Hg 53.2*   PCO2, ARTERIAL mm Hg 36.0   HCO3 ART mmol/L 30.8*     Results from last 7 days   Lab Units 12/11/19  0929   SODIUM mmol/L 141   POTASSIUM mmol/L 2.9*   MAGNESIUM mg/dL 2.3   CHLORIDE mmol/L 95*   CO2 mmol/L 27.4   BUN mg/dL 28*   CREATININE mg/dL 0.77   EGFR IF NONAFRICN AM mL/min/1.73 75   CALCIUM mg/dL 9.5   GLUCOSE mg/dL 121*   ALBUMIN g/dL 3.65   BILIRUBIN mg/dL 1.2   ALK PHOS U/L 130*   AST (SGOT) U/L 29   ALT (SGPT) U/L 27   Estimated Creatinine Clearance: 45.2 mL/min (by C-G formula based on SCr of 0.77 mg/dL).  No results found for: AMMONIA  Results from last 7 days   Lab Units 12/11/19  0929   TROPONIN T ng/mL <0.010     Results from last 7 days   Lab Units 12/11/19  0929   PROBNP pg/mL 231.2     No results found for: HGBA1C  Lab Results   Component Value Date    TSH 1.290 12/11/2019    FREET4 1.41 12/11/2019     No results found for: PREGTESTUR, PREGSERUM, HCG, HCGQUANT  Pain Management Panel     There is no flowsheet data to display.        No results found for: BLOODCX  No results found for: URINECX  No results found for: WOUNDCX  No results found for: STOOLCX      ---------------------------------------------------------------------------------------------------------------------  Imaging Results (Last 7 Days)     Procedure Component Value Units Date/Time    XR Abdomen 2 View With Chest 1 View [[id number]] Collected:  12/11/19 1022     Updated:  12/11/19 1024    Narrative:       EXAMINATION: XR ABDOMEN 2 VW W CHEST 1 VW-      One frontal view of the chest  and  two frontal views of the abdomen.     CLINICAL INDICATION:    64 years Female soa, vomiting     COMPARISON: NONE     FINDINGS:  The cardiac silhouette is normal in size and configuration.   LEFT UPPER LOBE AIRSPACE DISEASE. PATCHY BIBASILAR AIRSPACE DISEASE AND  FIBROSIS.   There is no pleural fluid. No free air beneath the diaphragm.  Supine and upright views of the abdomen show nonspecific bowel gas  pattern.  There is no evidence of bowel obstruction.  No intra-abdominal  soft tissue masses are demonstrated.   No pathological calcifications are identified.       Impression:       LEFT UPPER LOBE AIRSPACE DISEASE. PATCHY BIBASILAR AIRSPACE  DISEASE AND FIBROSIS.      This report was finalized on 12/11/2019 10:22 AM by Dr. Dougie Horton MD.             Cultures: Cultures collected in the ED      ---------------------------------------------------------------------------------------------------------------------  Assessment / Plan       Assessment and Plan:    Sepsis r/t Bilateral CAP (tachycardia (), Tachypnea (RR 26), Elevated CRP (11.45), hypoxia (p02 53.2): She was given first dose of Rocephin and doxycycline in the ED.  Continue those on admission.  Duo nebs, Mucinex, incentive spirometer every hour, Medrol 40 mg IV twice daily ordered.  Respiratory panel, respiratory culture and atypicals ordered.    (AP) Sepsis 2/2 PNA, bacterial, treating for CAP but also in setting of previously undiagnosed COPD  - I personally reviewed CT and negative for PE and did show b/l multifocal patchy consolidation c/w PNA  - Flu and resp panel were negative, S. Pneumo pending, Bcx's pending, attempting to obtain sputum Cx  - Continue IV Abx w/ Ceftriaxone and Doxy (day 1 Abx)  - Continue IV steroids for today, switch to PO in next 1-2 days for total 3-5 days   - Duonebs PRN, likely needs spiriva inhaler at discharge along w/ continued rescue inhaler  - Would recommend formal PFTs per PCP when PNA is resolved  - Discussed  likely diagnosis of COPD w/ patient and would recommend continued smoking cessation for improved long term prognosis    ? UTI, likely contamination: positive nitrites with small bilirubin, trace bacteria and 0-2 WBC, 7-12 squamous epithelial cells, rocephin is on for PNA but UTI not likely.    (AP) Agree w/ Ceftriaxone as per above     Acute hypoxic respiratory failure: likely related to PNA. Continue with plan as outlined above. Supplemental oxygen to maintain Sp02 90-92%.     (AP) Wean oxygen as able, no home oxygen, no home scheduled inhalers but does have rescue, duonebs scheduled and PRN    Acute hypokalemia: potassium is 2.9, protocol replacement ordered.     (AP) Agree w/ electrolyte replacement protocol, check Mg and replace if indicated    Mild hyperglycemia without reported history of DM: Clear related to acute infection, A1c added.    (AP) f/u A1c, FSBG and SSI while inpatient and receiving steroids    Hx of CVA (17 years ago): she reports she takes statin and aggrenox for this, no deficits, monitor.     (AP) Review home medications, likely resume aggrenox following med rec    Underweight, BMI 17.01: albumin is normal at 3.65, nutrition assessment ordered.     (AP) Consult nutrition, likely related to smoking status but does have smaller body habitus as well, cancer is always in differential when in an active smoker but no concerning nodules or masses or adenopathy seen on chest CT today.    Tobacco use: States she has not smoked in 2 weeks, order NicoDerm patches in case she needs them as she continues to feel better.  Urged patient to maintain abstinence from smoking.    (AP) Recommended smoking cessation, agree w/ NRT PRN    Activity: Up with assistance  Precautions: Falls  Diet: Regular   DVT prophylaxis: heparin   GI prophylaxis:Protonix PO    Code status: Full     Patient is high risk for the following reasons: Sepsis r/t PNA, Acute hypoxic respiratory failure     Harini Sharp,  CARMELO  12/11/19  12:42 PM  ---------------------------------------------------------------------------------------------------------------------   (GISELA) I have seen patient along rafael/ CARMELO Sharp and have updated her note to reflect my own findings.

## 2019-12-11 NOTE — ED NOTES
Dr. Montague verbalize that pt may be trasnported to CT and then to floor after scan. IV sites are clean dry and intact, and flush easily.     Donnie Barron, RN  12/11/19 0921

## 2019-12-12 LAB
ANION GAP SERPL CALCULATED.3IONS-SCNC: 13.8 MMOL/L (ref 5–15)
BASOPHILS # BLD AUTO: 0.01 10*3/MM3 (ref 0–0.2)
BASOPHILS NFR BLD AUTO: 0.1 % (ref 0–1.5)
BUN BLD-MCNC: 32 MG/DL (ref 8–23)
BUN/CREAT SERPL: 45.7 (ref 7–25)
CALCIUM SPEC-SCNC: 9.1 MG/DL (ref 8.6–10.5)
CHLORIDE SERPL-SCNC: 105 MMOL/L (ref 98–107)
CO2 SERPL-SCNC: 24.2 MMOL/L (ref 22–29)
CREAT BLD-MCNC: 0.7 MG/DL (ref 0.57–1)
CRP SERPL-MCNC: 7.39 MG/DL (ref 0–0.5)
DEPRECATED RDW RBC AUTO: 48.4 FL (ref 37–54)
EOSINOPHIL # BLD AUTO: 0 10*3/MM3 (ref 0–0.4)
EOSINOPHIL NFR BLD AUTO: 0 % (ref 0.3–6.2)
ERYTHROCYTE [DISTWIDTH] IN BLOOD BY AUTOMATED COUNT: 13.1 % (ref 12.3–15.4)
GFR SERPL CREATININE-BSD FRML MDRD: 84 ML/MIN/1.73
GLUCOSE BLD-MCNC: 174 MG/DL (ref 65–99)
GLUCOSE BLDC GLUCOMTR-MCNC: 165 MG/DL (ref 70–130)
GLUCOSE BLDC GLUCOMTR-MCNC: 172 MG/DL (ref 70–130)
GLUCOSE BLDC GLUCOMTR-MCNC: 190 MG/DL (ref 70–130)
GLUCOSE BLDC GLUCOMTR-MCNC: 219 MG/DL (ref 70–130)
HCT VFR BLD AUTO: 34.6 % (ref 34–46.6)
HGB BLD-MCNC: 11.6 G/DL (ref 12–15.9)
IMM GRANULOCYTES # BLD AUTO: 0.06 10*3/MM3 (ref 0–0.05)
IMM GRANULOCYTES NFR BLD AUTO: 0.6 % (ref 0–0.5)
LYMPHOCYTES # BLD AUTO: 0.62 10*3/MM3 (ref 0.7–3.1)
LYMPHOCYTES NFR BLD AUTO: 6.4 % (ref 19.6–45.3)
MCH RBC QN AUTO: 33.6 PG (ref 26.6–33)
MCHC RBC AUTO-ENTMCNC: 33.5 G/DL (ref 31.5–35.7)
MCV RBC AUTO: 100.3 FL (ref 79–97)
MONOCYTES # BLD AUTO: 0.27 10*3/MM3 (ref 0.1–0.9)
MONOCYTES NFR BLD AUTO: 2.8 % (ref 5–12)
NEUTROPHILS # BLD AUTO: 8.69 10*3/MM3 (ref 1.7–7)
NEUTROPHILS NFR BLD AUTO: 90.1 % (ref 42.7–76)
NRBC BLD AUTO-RTO: 0 /100 WBC (ref 0–0.2)
PLATELET # BLD AUTO: 333 10*3/MM3 (ref 140–450)
PMV BLD AUTO: 11 FL (ref 6–12)
POTASSIUM BLD-SCNC: 4.5 MMOL/L (ref 3.5–5.2)
RBC # BLD AUTO: 3.45 10*6/MM3 (ref 3.77–5.28)
SODIUM BLD-SCNC: 143 MMOL/L (ref 136–145)
WBC NRBC COR # BLD: 9.65 10*3/MM3 (ref 3.4–10.8)

## 2019-12-12 PROCEDURE — 80048 BASIC METABOLIC PNL TOTAL CA: CPT | Performed by: NURSE PRACTITIONER

## 2019-12-12 PROCEDURE — 94799 UNLISTED PULMONARY SVC/PX: CPT

## 2019-12-12 PROCEDURE — 99233 SBSQ HOSP IP/OBS HIGH 50: CPT | Performed by: NURSE PRACTITIONER

## 2019-12-12 PROCEDURE — 85025 COMPLETE CBC W/AUTO DIFF WBC: CPT | Performed by: NURSE PRACTITIONER

## 2019-12-12 PROCEDURE — 25010000002 CEFTRIAXONE: Performed by: NURSE PRACTITIONER

## 2019-12-12 PROCEDURE — 86140 C-REACTIVE PROTEIN: CPT | Performed by: NURSE PRACTITIONER

## 2019-12-12 PROCEDURE — 25010000002 METHYLPREDNISOLONE PER 40 MG: Performed by: NURSE PRACTITIONER

## 2019-12-12 PROCEDURE — 82962 GLUCOSE BLOOD TEST: CPT

## 2019-12-12 RX ADMIN — IPRATROPIUM BROMIDE AND ALBUTEROL SULFATE 3 ML: 2.5; .5 SOLUTION RESPIRATORY (INHALATION) at 06:43

## 2019-12-12 RX ADMIN — SODIUM CHLORIDE, PRESERVATIVE FREE 10 ML: 5 INJECTION INTRAVENOUS at 20:20

## 2019-12-12 RX ADMIN — DOXYCYCLINE 100 MG: 100 INJECTION, POWDER, LYOPHILIZED, FOR SOLUTION INTRAVENOUS at 00:59

## 2019-12-12 RX ADMIN — CEFTRIAXONE 2 G: 2 INJECTION, POWDER, FOR SOLUTION INTRAMUSCULAR; INTRAVENOUS at 10:54

## 2019-12-12 RX ADMIN — GUAIFENESIN 600 MG: 600 TABLET, EXTENDED RELEASE ORAL at 08:17

## 2019-12-12 RX ADMIN — METHYLPREDNISOLONE SODIUM SUCCINATE 40 MG: 40 INJECTION, POWDER, FOR SOLUTION INTRAMUSCULAR; INTRAVENOUS at 03:29

## 2019-12-12 RX ADMIN — METHYLPREDNISOLONE SODIUM SUCCINATE 40 MG: 40 INJECTION, POWDER, FOR SOLUTION INTRAMUSCULAR; INTRAVENOUS at 16:52

## 2019-12-12 RX ADMIN — IPRATROPIUM BROMIDE AND ALBUTEROL SULFATE 3 ML: 2.5; .5 SOLUTION RESPIRATORY (INHALATION) at 13:03

## 2019-12-12 RX ADMIN — IPRATROPIUM BROMIDE AND ALBUTEROL SULFATE 3 ML: 2.5; .5 SOLUTION RESPIRATORY (INHALATION) at 20:30

## 2019-12-12 RX ADMIN — SODIUM CHLORIDE, PRESERVATIVE FREE 10 ML: 5 INJECTION INTRAVENOUS at 08:17

## 2019-12-12 RX ADMIN — ASPIRIN AND EXTENDED-RELEASE DIPYRIDAMOLE 1 CAPSULE: 25; 200 CAPSULE ORAL at 08:17

## 2019-12-12 RX ADMIN — GUAIFENESIN 600 MG: 600 TABLET, EXTENDED RELEASE ORAL at 20:19

## 2019-12-12 RX ADMIN — ASPIRIN AND EXTENDED-RELEASE DIPYRIDAMOLE 1 CAPSULE: 25; 200 CAPSULE ORAL at 20:19

## 2019-12-12 RX ADMIN — DOXYCYCLINE 100 MG: 100 INJECTION, POWDER, LYOPHILIZED, FOR SOLUTION INTRAVENOUS at 11:38

## 2019-12-12 NOTE — PLAN OF CARE
Pt resting comfortably in bed. SOA on exertion. Currently on 2L NC. Will attempt to wean off. Pt refusing to take insulin, heparin, and protonix. Will continue to educate and monitor.

## 2019-12-12 NOTE — PROGRESS NOTES
Patient Identification:  Name:  Florinda Barr  Age:  64 y.o.  Sex:  female  :  1955  MRN:  [id number]  Visit Number:  [id number]  Primary Care Provider:  Paulina Rod MD    Length of stay:  1    Chief Complaint: f/u pna    HPI:     Mrs. Barr is a 64 year old female patient who was admitted on 19 for Sepsis r/t CAP, +Mycoplasma. She reports her only significant past medical history to be CVA , hyperlipidemia, and tobacco use.     Subjective:      Mrs. Barr is doing well this morning, she reports some improvement in her breathing. She continues to have a cough, no nausea, vomiting or chest pain. No family is present during my exam. Discussed with VENITA Carlson.  ----------------------------------------------------------------------------------------------------------------------  Current Hospital Meds:    aspirin-dipyridamole 1 capsule Oral BID   cefTRIAXone 2 g Intravenous Q24H   doxycycline 100 mg Intravenous Q12H   folic acid 400 mcg Oral Every Other Day   guaiFENesin 600 mg Oral Q12H   heparin (porcine) 5,000 Units Subcutaneous Q8H   insulin aspart 0-7 Units Subcutaneous 4x Daily AC & at Bedtime   ipratropium-albuterol 3 mL Nebulization 4x Daily - RT   methylPREDNISolone sodium succinate 40 mg Intravenous Q12H   nicotine 1 patch Transdermal Q24H   pantoprazole 40 mg Oral Q AM   sodium chloride 10 mL Intravenous Q12H        ----------------------------------------------------------------------------------------------------------------------  Vital Signs:  Temp:  [97.5 °F (36.4 °C)-98.7 °F (37.1 °C)] 97.8 °F (36.6 °C)  Heart Rate:  [] 71  Resp:  [20-27] 20  BP: ()/(51-90) 120/61      19  0908 19  1417 19  0522   Weight: 40.8 kg (90 lb) 43.5 kg (96 lb) 44.5 kg (98 lb)     Body mass index is 18.52 kg/m².    Intake/Output Summary (Last 24 hours) at 2019 0759  Last data filed at 2019 0059  Gross per 24 hour   Intake 440 ml   Output 200 ml   Net 240 ml     No  intake/output data recorded.  Diet Regular; Cardiac  ----------------------------------------------------------------------------------------------------------------------  Physical exam:  Constitutional:  Thin female, on NC in no acute distress  HENT:  Head:  Normocephalic and atraumatic.  Mouth:  Moist mucous membranes.    Eyes: Pupils are equal, round, and reactive to light.  No scleral icterus.    Neck:  Neck supple.  No JVD present.    Cardiovascular:  Normal rate, regular rhythm and normal heart sounds with no murmur.  Pulmonary/Chest:  No respiratory distress, no tachypnea but does use pursed lip breathing at times. Bilateral lower lobes diminished  Abdominal:  Soft.  Bowel sounds are normal.  No distension and no tenderness.   Musculoskeletal:  No edema, no tenderness, and no deformity.  No red or swollen joints anywhere.    Neurological:  Alert and oriented to person, place, and time.    No tongue deviation.  No facial droop.  No slurred speech.   Skin:  Skin is warm and dry. No rash noted. No pallor.   ----------------------------------------------------------------------------------------------------------------------  Tele:        ----------------------------------------------------------------------------------------------------------------------  Results from last 7 days   Lab Units 12/11/19  0929   TROPONIN T ng/mL <0.010   PROBNP pg/mL 231.2     Results from last 7 days   Lab Units 12/11/19  0929   CRP mg/dL 11.45*   LACTATE mmol/L 2.0   WBC 10*3/mm3 10.33   HEMOGLOBIN g/dL 14.0   HEMATOCRIT % 42.3   MCV fL 97.5*   MCHC g/dL 33.1   PLATELETS 10*3/mm3 362     Results from last 7 days   Lab Units 12/11/19  0938   PH, ARTERIAL pH units 7.540*   PO2 ART mm Hg 53.2*   PCO2, ARTERIAL mm Hg 36.0   HCO3 ART mmol/L 30.8*     Results from last 7 days   Lab Units 12/11/19  0929   SODIUM mmol/L 141   POTASSIUM mmol/L 2.9*   MAGNESIUM mg/dL 2.3   CHLORIDE mmol/L 95*   CO2 mmol/L 27.4   BUN mg/dL 28*   CREATININE  mg/dL 0.77   EGFR IF NONAFRICN AM mL/min/1.73 75   CALCIUM mg/dL 9.5   GLUCOSE mg/dL 121*   ALBUMIN g/dL 3.65   BILIRUBIN mg/dL 1.2   ALK PHOS U/L 130*   AST (SGOT) U/L 29   ALT (SGPT) U/L 27   Estimated Creatinine Clearance: 49.3 mL/min (by C-G formula based on SCr of 0.77 mg/dL).  No results found for: AMMONIA      No results found for: BLOODCX  No results found for: URINECX  No results found for: WOUNDCX  No results found for: STOOLCX  ----------------------------------------------------------------------------------------------------------------------  Imaging Results (Last 24 Hours)     Procedure Component Value Units Date/Time    CT Chest Pulmonary Embolism [[id number]] Collected:  12/11/19 1419     Updated:  12/11/19 1422    Narrative:       EXAMINATION: CT CHEST PULMONARY EMBOLISM-      Technique: Multiple CT axial images were obtained through the level of  pulmonary arteries, following IV contrast administration per CT PE  protocol.  Volume Rendered 3D or MIP images performed.     Radiation dose reduction techniques were utilized per ALARA protocol.  Automated exposure control was initiated through either or [employer] or  [employer] software packages by  protocol.                CLINICAL INDICATION:    SOB and Chest Pain     COMPARISON:    None     FINDINGS:    Scattered tree-in-bud opacities throughout both lungs with some more  confluent areas of consolidation of the left upper and anterior right  upper lobes that may represent multifocal pneumonia. There is no  pulmonary artery filling defect to suggest pulmonary embolism.   No pleural effusion.  There is no thoracic adenopathy.   Incidentally imaged upper abdomen is unremarkable.   Bone windows show no acute osseous abnormality.       Impression:          1. No PE.   2. Scattered tree-in-bud opacities throughout both lungs with some more  confluent areas of consolidation of the left upper and anterior right  upper lobes that may represent  multifocal pneumonia.      This report was finalized on 12/11/2019 2:20 PM by Dr. Dougie Horton MD.       XR Abdomen 2 View With Chest 1 View [[id number]] Collected:  12/11/19 1022     Updated:  12/11/19 1024    Narrative:       EXAMINATION: XR ABDOMEN 2 VW W CHEST 1 VW-      One frontal view of the chest  and two frontal views of the abdomen.     CLINICAL INDICATION:    64 years Female soa, vomiting     COMPARISON: NONE     FINDINGS:  The cardiac silhouette is normal in size and configuration.   LEFT UPPER LOBE AIRSPACE DISEASE. PATCHY BIBASILAR AIRSPACE DISEASE AND  FIBROSIS.   There is no pleural fluid. No free air beneath the diaphragm.  Supine and upright views of the abdomen show nonspecific bowel gas  pattern.  There is no evidence of bowel obstruction.  No intra-abdominal  soft tissue masses are demonstrated.   No pathological calcifications are identified.       Impression:       LEFT UPPER LOBE AIRSPACE DISEASE. PATCHY BIBASILAR AIRSPACE  DISEASE AND FIBROSIS.      This report was finalized on 12/11/2019 10:22 AM by Dr. Dougie Horton MD.           ----------------------------------------------------------------------------------------------------------------------  Assessment and Plan:    Sepsis r/t Bilateral CAP, +Mycoplasma:   Continue rocephin and doxy, today is day #2,  Duo nebs, Mucinex, incentive spirometer every hour, Medrol 40 mg IV twice daily ordered.  Respiratory panel negative, she was positive for mycoplasma, continue doxy, isolate per hospital policy. Repeat labs in the am.      *Will need follow up chest xray in around 6 weeks for follow up and would recommend yearly CT low dose screening with her smoking history*    ? UTI, likely contamination: positive nitrites with small bilirubin, trace bacteria and 0-2 WBC, 7-12 squamous epithelial cells, rocephin is on for PNA but UTI not likely.     Acute hypoxic respiratory failure: likely related to PNA. Continue with plan as outlined above.  Supplemental oxygen to maintain Sp02 90-92%.     Acute hypokalemia: resolved, potassium is 4.5.     Mild hyperglycemia without reported history of DM, A1c 5.60: glucoses continued to run 174-219, she has been refusing sliding scale coverage as her hyperglycemia is steroid induced. I discussed the importance of controlled glucose and she continues to refuse insulin. If her sugar continues to run higher may have to consider switching her to oral prednisone in hopes of better control. Continue accu checks ac/hs and prn, hypoglycemia protocol in place.     Hx of CVA (17 years ago): she reports she takes statin and aggrenox for this, no deficits, monitor.     Underweight, BMI 18.52:  nutrition assessment ordered.    Tobacco use: States she has not smoked in 2 weeks, order NicoDerm patches in case she needs them as she continues to feel better.  Urged patient to maintain abstinence from smoking.    She has been refusing heparin and Protonix as well as her insulin.  Heparin DVT prophylaxis discussed in detail with the patient regarding helping to prevent any blood clots, and the use of Protonix given her steroid use and stress ulcer risk discussed and those complications were again discussed in detail and she continues to refuse those medications.    Activity: Up with assistance  Precautions: Falls  Diet: Regular   DVT prophylaxis: heparin   GI prophylaxis:Protonix PO     Code status: Full      Patient is high risk for the following reasons: Sepsis r/t PNA, Acute hypoxic respiratory failure     Harini Sharp, APRN  12/12/19  7:59 AM

## 2019-12-12 NOTE — PROGRESS NOTES
Discharge Planning Assessment  [facility]     Patient Name: Florinda Barr  MRN: [id number]  Today's Date: 12/12/2019    Admit Date: 12/11/2019    Discharge Needs Assessment     Row Name 12/12/19 1218       Living Environment    Lives With  alone    Current Living Arrangements  home/apartment/condo    Primary Care Provided by  self    Provides Primary Care For  no one    Family Caregiver if Needed  child(ivania), adult;grandchild(ivania), adult    Quality of Family Relationships  involved;helpful    Able to Return to Prior Arrangements  yes       Resource/Environmental Concerns    Transportation Concerns  car, none       Transition Planning    Patient/Family Anticipates Transition to  home with family    Transportation Anticipated  family or friend will provide       Discharge Needs Assessment    Equipment Currently Used at Home  none    Equipment Needed After Discharge  none        Discharge Plan     Row Name 12/12/19 1211       Plan    Plan  SS spoke with pt on this day. Pt lives at home alone. Pt's Natacha rodriguez stay with her at times. Pt does not receive HH services, or use any DME. Pt does not have a POA or a living will. Pt uses [facility], and her PCP is Dr. Rod. Pt plans to return home at discharge, with transportation provided by her daughter. SS will follow and assist as needed.     Patient/Family in Agreement with Plan  yes            Demographic Summary     Row Name 12/12/19 1218       General Information    Admission Type  inpatient    Referral Source  nursing    Reason for Consult  discharge planning    Preferred Language  English     Used During This Interaction  no          IDANIA Benavides

## 2019-12-12 NOTE — PAYOR COMM NOTE
"[facility]  NPI :[id number]    Utilization Review  Contact: Izabela Campa RN  Phone: [phone]  Fax:[phone]    NOTIFICATION OF ADMISSION FOR CO-PAYMENTS AND CO-INSURANCE DUE TO MEDICARE PRIMARY  ICD: J18.9          Florinda Cota (64 y.o. Female)     Date of Birth Social Security Number Address Home Phone MRN    1955  [address]  [place] [id number] [phone] [id number]    Jewish Marital Status          None        Admission Date Admission Type Admitting Provider Attending Provider Department, Room/Bed    12/11/19 Emergency Mike Smith MD Parks, Andrew, MD [address], [facility]/    Discharge Date Discharge Disposition Discharge Destination                       Attending Provider:  Mike Smith MD    Allergies:  Codeine, Latex    Isolation:  Droplet   Infection:  Mycoplasma pneumonia (12/11/19)   Code Status:  CPR    Ht:  154.9 cm (61\")   Wt:  44.5 kg (98 lb)    Admission Cmt:  None   Principal Problem:  None                Active Insurance as of 12/11/2019     Primary Coverage     Payor Plan Insurance Group Employer/Plan Group    MEDICARE MEDICARE A & B      Payor Plan Address Payor Plan Phone Number Payor Plan Fax Number Effective Dates    [address] [phone]  5/1/2005 - None Entered    [facility] [id number]       Subscriber Name Subscriber Birth Date Member ID       FLORINDA COTA 1955 [id number]           Secondary Coverage     Payor Plan Insurance Group Employer/Plan Group    WELL[place] WE[place]ARE [place]      Payor Plan Address Payor Plan Phone Number Payor Plan Fax Number Effective Dates    [address] [address] [phone]  6/22/2016 - None Entered    [facility] [id number]       Subscriber Name Subscriber Birth Date Member ID       FLORINDA COTA 1955 [id number]                 Emergency Contacts      (Rel.) Home Phone Work Phone Mobile Phone    Tyra Nicholas (Daughter) [phone] -- --              "

## 2019-12-12 NOTE — NURSING NOTE
"Pt resting with no complaints. Pt refuses insulin, heparin, and protonix. States she \" doesn't want to get hooked on medications then have to take at home.\" Pt educated. No acute distress noted.    "

## 2019-12-13 LAB
ANION GAP SERPL CALCULATED.3IONS-SCNC: 11.9 MMOL/L (ref 5–15)
BACTERIA SPEC RESP CULT: NORMAL
BASOPHILS # BLD AUTO: 0.02 10*3/MM3 (ref 0–0.2)
BASOPHILS NFR BLD AUTO: 0.2 % (ref 0–1.5)
BUN BLD-MCNC: 28 MG/DL (ref 8–23)
BUN/CREAT SERPL: 53.8 (ref 7–25)
CALCIUM SPEC-SCNC: 9 MG/DL (ref 8.6–10.5)
CHLORIDE SERPL-SCNC: 104 MMOL/L (ref 98–107)
CO2 SERPL-SCNC: 27.1 MMOL/L (ref 22–29)
CREAT BLD-MCNC: 0.52 MG/DL (ref 0.57–1)
CRP SERPL-MCNC: 3.86 MG/DL (ref 0–0.5)
DEPRECATED RDW RBC AUTO: 50.7 FL (ref 37–54)
EOSINOPHIL # BLD AUTO: 0.23 10*3/MM3 (ref 0–0.4)
EOSINOPHIL NFR BLD AUTO: 2.4 % (ref 0.3–6.2)
ERYTHROCYTE [DISTWIDTH] IN BLOOD BY AUTOMATED COUNT: 13.3 % (ref 12.3–15.4)
GFR SERPL CREATININE-BSD FRML MDRD: 118 ML/MIN/1.73
GLUCOSE BLD-MCNC: 140 MG/DL (ref 65–99)
GLUCOSE BLDC GLUCOMTR-MCNC: 120 MG/DL (ref 70–130)
GLUCOSE BLDC GLUCOMTR-MCNC: 143 MG/DL (ref 70–130)
GLUCOSE BLDC GLUCOMTR-MCNC: 148 MG/DL (ref 70–130)
GLUCOSE BLDC GLUCOMTR-MCNC: 150 MG/DL (ref 70–130)
GRAM STN SPEC: NORMAL
HCT VFR BLD AUTO: 33.4 % (ref 34–46.6)
HGB BLD-MCNC: 10.9 G/DL (ref 12–15.9)
IMM GRANULOCYTES # BLD AUTO: 0.03 10*3/MM3 (ref 0–0.05)
IMM GRANULOCYTES NFR BLD AUTO: 0.3 % (ref 0–0.5)
IRON 24H UR-MRATE: 105 MCG/DL (ref 37–145)
IRON SATN MFR SERPL: 42 % (ref 20–50)
LYMPHOCYTES # BLD AUTO: 0.52 10*3/MM3 (ref 0.7–3.1)
LYMPHOCYTES NFR BLD AUTO: 5.5 % (ref 19.6–45.3)
MCH RBC QN AUTO: 33.2 PG (ref 26.6–33)
MCHC RBC AUTO-ENTMCNC: 32.6 G/DL (ref 31.5–35.7)
MCV RBC AUTO: 101.8 FL (ref 79–97)
MONOCYTES # BLD AUTO: 0.34 10*3/MM3 (ref 0.1–0.9)
MONOCYTES NFR BLD AUTO: 3.6 % (ref 5–12)
NEUTROPHILS # BLD AUTO: 8.33 10*3/MM3 (ref 1.7–7)
NEUTROPHILS NFR BLD AUTO: 88 % (ref 42.7–76)
NRBC BLD AUTO-RTO: 0 /100 WBC (ref 0–0.2)
PLATELET # BLD AUTO: 302 10*3/MM3 (ref 140–450)
PMV BLD AUTO: 10.8 FL (ref 6–12)
POTASSIUM BLD-SCNC: 4.3 MMOL/L (ref 3.5–5.2)
RBC # BLD AUTO: 3.28 10*6/MM3 (ref 3.77–5.28)
SODIUM BLD-SCNC: 143 MMOL/L (ref 136–145)
TIBC SERPL-MCNC: 247 MCG/DL (ref 298–536)
TRANSFERRIN SERPL-MCNC: 166 MG/DL (ref 200–360)
WBC NRBC COR # BLD: 9.47 10*3/MM3 (ref 3.4–10.8)

## 2019-12-13 PROCEDURE — 82607 VITAMIN B-12: CPT | Performed by: NURSE PRACTITIONER

## 2019-12-13 PROCEDURE — 99233 SBSQ HOSP IP/OBS HIGH 50: CPT | Performed by: NURSE PRACTITIONER

## 2019-12-13 PROCEDURE — 82746 ASSAY OF FOLIC ACID SERUM: CPT | Performed by: NURSE PRACTITIONER

## 2019-12-13 PROCEDURE — 85025 COMPLETE CBC W/AUTO DIFF WBC: CPT | Performed by: NURSE PRACTITIONER

## 2019-12-13 PROCEDURE — 94799 UNLISTED PULMONARY SVC/PX: CPT

## 2019-12-13 PROCEDURE — 84466 ASSAY OF TRANSFERRIN: CPT | Performed by: NURSE PRACTITIONER

## 2019-12-13 PROCEDURE — 25010000002 METHYLPREDNISOLONE PER 40 MG: Performed by: NURSE PRACTITIONER

## 2019-12-13 PROCEDURE — 83540 ASSAY OF IRON: CPT | Performed by: NURSE PRACTITIONER

## 2019-12-13 PROCEDURE — 82962 GLUCOSE BLOOD TEST: CPT

## 2019-12-13 PROCEDURE — 25010000002 CEFTRIAXONE: Performed by: NURSE PRACTITIONER

## 2019-12-13 PROCEDURE — 86140 C-REACTIVE PROTEIN: CPT | Performed by: NURSE PRACTITIONER

## 2019-12-13 PROCEDURE — 80048 BASIC METABOLIC PNL TOTAL CA: CPT | Performed by: NURSE PRACTITIONER

## 2019-12-13 RX ORDER — ACETAMINOPHEN 325 MG/1
650 TABLET ORAL ONCE
Status: COMPLETED | OUTPATIENT
Start: 2019-12-13 | End: 2019-12-13

## 2019-12-13 RX ORDER — METHYLPREDNISOLONE SODIUM SUCCINATE 40 MG/ML
20 INJECTION, POWDER, LYOPHILIZED, FOR SOLUTION INTRAMUSCULAR; INTRAVENOUS EVERY 12 HOURS
Status: DISCONTINUED | OUTPATIENT
Start: 2019-12-13 | End: 2019-12-14

## 2019-12-13 RX ADMIN — GUAIFENESIN 600 MG: 600 TABLET, EXTENDED RELEASE ORAL at 08:21

## 2019-12-13 RX ADMIN — IPRATROPIUM BROMIDE AND ALBUTEROL SULFATE 3 ML: 2.5; .5 SOLUTION RESPIRATORY (INHALATION) at 19:41

## 2019-12-13 RX ADMIN — METHYLPREDNISOLONE SODIUM SUCCINATE 40 MG: 40 INJECTION, POWDER, FOR SOLUTION INTRAMUSCULAR; INTRAVENOUS at 04:09

## 2019-12-13 RX ADMIN — DOXYCYCLINE 100 MG: 100 INJECTION, POWDER, LYOPHILIZED, FOR SOLUTION INTRAVENOUS at 00:03

## 2019-12-13 RX ADMIN — SODIUM CHLORIDE, PRESERVATIVE FREE 10 ML: 5 INJECTION INTRAVENOUS at 08:21

## 2019-12-13 RX ADMIN — ACETAMINOPHEN 650 MG: 325 TABLET ORAL at 00:18

## 2019-12-13 RX ADMIN — SODIUM CHLORIDE, PRESERVATIVE FREE 10 ML: 5 INJECTION INTRAVENOUS at 21:42

## 2019-12-13 RX ADMIN — CEFTRIAXONE 2 G: 2 INJECTION, POWDER, FOR SOLUTION INTRAMUSCULAR; INTRAVENOUS at 10:38

## 2019-12-13 RX ADMIN — GUAIFENESIN 600 MG: 600 TABLET, EXTENDED RELEASE ORAL at 21:41

## 2019-12-13 RX ADMIN — DOXYCYCLINE 100 MG: 100 INJECTION, POWDER, LYOPHILIZED, FOR SOLUTION INTRAVENOUS at 12:30

## 2019-12-13 RX ADMIN — ASPIRIN AND EXTENDED-RELEASE DIPYRIDAMOLE 1 CAPSULE: 25; 200 CAPSULE ORAL at 21:41

## 2019-12-13 RX ADMIN — METHYLPREDNISOLONE SODIUM SUCCINATE 20 MG: 40 INJECTION, POWDER, FOR SOLUTION INTRAMUSCULAR; INTRAVENOUS at 16:41

## 2019-12-13 RX ADMIN — IPRATROPIUM BROMIDE AND ALBUTEROL SULFATE 3 ML: 2.5; .5 SOLUTION RESPIRATORY (INHALATION) at 07:08

## 2019-12-13 RX ADMIN — ASPIRIN AND EXTENDED-RELEASE DIPYRIDAMOLE 1 CAPSULE: 25; 200 CAPSULE ORAL at 08:21

## 2019-12-13 NOTE — PROGRESS NOTES
Patient Identification:  Name:  Florinda Barr  Age:  64 y.o.  Sex:  female  :  1955  MRN:  [id number]  Visit Number:  [id number]  Primary Care Provider:  Paulina Rod MD    Length of stay:  2    Chief Complaint: f/u PNA    HPI:     Mrs. Barr is a 64 year old female patient who was admitted on 19 for Sepsis r/t CAP, +Mycoplasma. She reports her only significant past medical history to be CVA , hyperlipidemia, and tobacco use.     Subjective:      Mrs. Barr is doing well this morning, she reports she is feeling better. She continues to have productive cough but mostly cream/white now. Her oxygen has been weaned to 1 liter NC. Discussed with VENITA Carlson.  ----------------------------------------------------------------------------------------------------------------------  Current Hospital Meds:    aspirin-dipyridamole 1 capsule Oral BID   cefTRIAXone 2 g Intravenous Q24H   doxycycline 100 mg Intravenous Q12H   folic acid 400 mcg Oral Every Other Day   guaiFENesin 600 mg Oral Q12H   heparin (porcine) 5,000 Units Subcutaneous Q8H   insulin aspart 0-7 Units Subcutaneous 4x Daily AC & at Bedtime   ipratropium-albuterol 3 mL Nebulization 4x Daily - RT   methylPREDNISolone sodium succinate 40 mg Intravenous Q12H   nicotine 1 patch Transdermal Q24H   pantoprazole 40 mg Oral Q AM   sodium chloride 10 mL Intravenous Q12H        ----------------------------------------------------------------------------------------------------------------------  Vital Signs:  Temp:  [97.5 °F (36.4 °C)-98.4 °F (36.9 °C)] 98.4 °F (36.9 °C)  Heart Rate:  [] 77  Resp:  [18-20] 20  BP: (113-123)/(53-69) 123/60      19  1417 19  0522 19  0536   Weight: 43.5 kg (96 lb) 44.5 kg (98 lb) 44.5 kg (98 lb)     Body mass index is 18.53 kg/m².    Intake/Output Summary (Last 24 hours) at 2019 0736  Last data filed at 2019 0003  Gross per 24 hour   Intake 700 ml   Output 1050 ml   Net -350 ml     No  intake/output data recorded.  Diet Regular; Cardiac  ----------------------------------------------------------------------------------------------------------------------  Physical exam:  Constitutional:  Thin female on 1 liter NC, in no acute distress, pursed lip breathing at times  HENT:  Head:  Normocephalic and atraumatic.  Mouth:  Moist mucous membranes.    Eyes:   Pupils are equal, round, and reactive to light.  No scleral icterus.    Neck:  Neck supple.     Cardiovascular:  Normal rate, regular rhythm and normal heart sounds with no murmur.  Pulmonary/Chest:  Diminished lower lobes, with some fine crackles in the upper lobes, no distress but does use pursed lip breathing at times after long conversation, Oxygen requirement has improved, productive cough also improving.  Abdominal:  Soft.  Bowel sounds are normal.  No distension and no tenderness.   Musculoskeletal:  No edema, no tenderness, and no deformity.  No red or swollen joints anywhere.    Neurological:  Alert and oriented to person, place, and time.  No tongue deviation.  No facial droop.  No slurred speech.   Skin:  Skin is warm and dry. No rash noted. No pallor.   ----------------------------------------------------------------------------------------------------------------------  Tele:        ----------------------------------------------------------------------------------------------------------------------  Results from last 7 days   Lab Units 12/11/19  0929   TROPONIN T ng/mL <0.010   PROBNP pg/mL 231.2     Results from last 7 days   Lab Units 12/12/19  0720 12/11/19  0929   CRP mg/dL 7.39* 11.45*   LACTATE mmol/L  --  2.0   WBC 10*3/mm3 9.65 10.33   HEMOGLOBIN g/dL 11.6* 14.0   HEMATOCRIT % 34.6 42.3   MCV fL 100.3* 97.5*   MCHC g/dL 33.5 33.1   PLATELETS 10*3/mm3 333 362     Results from last 7 days   Lab Units 12/11/19  0938   PH, ARTERIAL pH units 7.540*   PO2 ART mm Hg 53.2*   PCO2, ARTERIAL mm Hg 36.0   HCO3 ART mmol/L 30.8*     Results  from last 7 days   Lab Units 12/12/19  0720 12/11/19  0929   SODIUM mmol/L 143 141   POTASSIUM mmol/L 4.5 2.9*   MAGNESIUM mg/dL  --  2.3   CHLORIDE mmol/L 105 95*   CO2 mmol/L 24.2 27.4   BUN mg/dL 32* 28*   CREATININE mg/dL 0.70 0.77   EGFR IF NONAFRICN AM mL/min/1.73 84 75   CALCIUM mg/dL 9.1 9.5   GLUCOSE mg/dL 174* 121*   ALBUMIN g/dL  --  3.65   BILIRUBIN mg/dL  --  1.2   ALK PHOS U/L  --  130*   AST (SGOT) U/L  --  29   ALT (SGPT) U/L  --  27   Estimated Creatinine Clearance: 49.3 mL/min (by C-G formula based on SCr of 0.7 mg/dL).  No results found for: AMMONIA      Blood Culture   Date Value Ref Range Status   12/11/2019 No growth at 24 hours  Preliminary   12/11/2019 No growth at 24 hours  Preliminary     No results found for: URINECX  No results found for: WOUNDCX  No results found for: STOOLCX  ----------------------------------------------------------------------------------------------------------------------  Imaging Results (Last 24 Hours)     ** No results found for the last 24 hours. **        ----------------------------------------------------------------------------------------------------------------------  Assessment and Plan:    Sepsis r/t Bilateral CAP, +Mycoplasma:   Continue rocephin and doxy, today is day #3,  Duo nebs, Mucinex, incentive spirometer every hour, Medrol 40 mg IV BID, decreased to 20 mg IV BID. Respiratory panel negative, she was positive for mycoplasma, continue doxy, isolate per hospital policy. WBC is 9.47, CRP is 3.86, push incentive spirometer, Repeat labs in the am.  respiratory culture showing scant growth with normal respiratory deann, blood cultures show no growth to date.      *Will need follow up chest xray in around 6 weeks for follow up and would recommend yearly CT low dose screening with her smoking history*     ? UTI, likely contamination: positive nitrites with small bilirubin, trace bacteria and 0-2 WBC, 7-12 squamous epithelial cells, rocephin is on for PNA  but UTI not likely.      Acute hypoxic respiratory failure: likely related to PNA, and likely underlying COPD. Continue with plan as outlined above. Supplemental oxygen to maintain Sp02 90-92%.     Macrocytic Anemia: H/H is 10.9/33.4, Vitamin b12, folate and iron profile ordered.      Mild hyperglycemia without reported history of DM, A1c 5.60: glucoses are 140-219, she continues to refuse sliding scale coverage as her hyperglycemia is steroid induced. Again today discussed the importance of controlled glucose and she continues to refuse insulin.  Continue accu checks ac/hs and prn, hypoglycemia protocol in place.       Hx of CVA (17 years ago): she reports she takes statin and aggrenox for this, no deficits, monitor.      Underweight, BMI 18.53:  nutrition assessment ordered.     Tobacco use: States she has not smoked in 2 weeks at the time of admission, order NicoDerm patches in case she needs them as she continues to feel better.  Continue to urge patient to refrain from tobacco use.     Activity: Up with assistance  Precautions: Falls  Diet: Regular   DVT prophylaxis: heparin, SCDs ordered  GI prophylaxis:Protonix PO     Code status: Full      Patient is high risk for the following reasons: Sepsis r/t PNA, Acute hypoxic respiratory failure        Harini Sharp, APRN  12/13/19  7:36 AM

## 2019-12-13 NOTE — PLAN OF CARE
Pt resting comfortably. Titrated o2 to 1L. Tolerated well. Complained of headache, Tylenol given. Continues to refuse insulin, heparin, and protonix. Educated on importance, continues to refuse.

## 2019-12-13 NOTE — PROGRESS NOTES
Discharge Planning Assessment   [facility]     Patient Name: Florinda Barr  MRN: [id number]  Today's Date: 12/13/2019    Admit Date: 12/11/2019      Discharge Plan     Row Name 12/13/19 1430       Plan    Plan  Pt admitted on 12/11/19.  Pt lives at home alone and plans to return home at discharge.  Pt currently does not utilize home health or DME.  SS will follow.           IDANIA Bray

## 2019-12-13 NOTE — PLAN OF CARE
Pt resting in bed comfortably. Pt feels like she has improved. Pt O2 saturation 90-95% on room air. Will continue to monitor.

## 2019-12-14 VITALS
HEIGHT: 61 IN | HEART RATE: 87 BPM | TEMPERATURE: 98.4 F | WEIGHT: 102 LBS | DIASTOLIC BLOOD PRESSURE: 75 MMHG | BODY MASS INDEX: 19.26 KG/M2 | OXYGEN SATURATION: 94 % | RESPIRATION RATE: 18 BRPM | SYSTOLIC BLOOD PRESSURE: 143 MMHG

## 2019-12-14 LAB
ANION GAP SERPL CALCULATED.3IONS-SCNC: 11 MMOL/L (ref 5–15)
BASOPHILS # BLD AUTO: 0.01 10*3/MM3 (ref 0–0.2)
BASOPHILS NFR BLD AUTO: 0.1 % (ref 0–1.5)
BUN BLD-MCNC: 22 MG/DL (ref 8–23)
BUN/CREAT SERPL: 44 (ref 7–25)
CALCIUM SPEC-SCNC: 8.9 MG/DL (ref 8.6–10.5)
CHLORIDE SERPL-SCNC: 103 MMOL/L (ref 98–107)
CO2 SERPL-SCNC: 26 MMOL/L (ref 22–29)
CREAT BLD-MCNC: 0.5 MG/DL (ref 0.57–1)
CRP SERPL-MCNC: 2.13 MG/DL (ref 0–0.5)
DEPRECATED RDW RBC AUTO: 51.8 FL (ref 37–54)
EOSINOPHIL # BLD AUTO: 0 10*3/MM3 (ref 0–0.4)
EOSINOPHIL NFR BLD AUTO: 0 % (ref 0.3–6.2)
ERYTHROCYTE [DISTWIDTH] IN BLOOD BY AUTOMATED COUNT: 13.6 % (ref 12.3–15.4)
FOLATE SERPL-MCNC: 8.32 NG/ML (ref 4.78–24.2)
GFR SERPL CREATININE-BSD FRML MDRD: 124 ML/MIN/1.73
GLUCOSE BLD-MCNC: 103 MG/DL (ref 65–99)
GLUCOSE BLDC GLUCOMTR-MCNC: 133 MG/DL (ref 70–130)
HCT VFR BLD AUTO: 31.9 % (ref 34–46.6)
HGB BLD-MCNC: 11.2 G/DL (ref 12–15.9)
IMM GRANULOCYTES # BLD AUTO: 0.05 10*3/MM3 (ref 0–0.05)
IMM GRANULOCYTES NFR BLD AUTO: 0.7 % (ref 0–0.5)
LYMPHOCYTES # BLD AUTO: 1.23 10*3/MM3 (ref 0.7–3.1)
LYMPHOCYTES NFR BLD AUTO: 16.6 % (ref 19.6–45.3)
MCH RBC QN AUTO: 37.7 PG (ref 26.6–33)
MCHC RBC AUTO-ENTMCNC: 35.1 G/DL (ref 31.5–35.7)
MCV RBC AUTO: 107.4 FL (ref 79–97)
MONOCYTES # BLD AUTO: 0.48 10*3/MM3 (ref 0.1–0.9)
MONOCYTES NFR BLD AUTO: 6.5 % (ref 5–12)
NEUTROPHILS # BLD AUTO: 5.65 10*3/MM3 (ref 1.7–7)
NEUTROPHILS NFR BLD AUTO: 76.1 % (ref 42.7–76)
NRBC BLD AUTO-RTO: 0 /100 WBC (ref 0–0.2)
PLAT MORPH BLD: NORMAL
PLATELET # BLD AUTO: 303 10*3/MM3 (ref 140–450)
PMV BLD AUTO: 10.9 FL (ref 6–12)
POTASSIUM BLD-SCNC: 4.4 MMOL/L (ref 3.5–5.2)
RBC # BLD AUTO: 2.97 10*6/MM3 (ref 3.77–5.28)
RBC MORPH BLD: NORMAL
SODIUM BLD-SCNC: 140 MMOL/L (ref 136–145)
VIT B12 BLD-MCNC: 684 PG/ML (ref 211–946)
WBC NRBC COR # BLD: 7.42 10*3/MM3 (ref 3.4–10.8)

## 2019-12-14 PROCEDURE — 63710000001 PREDNISONE PER 1 MG: Performed by: NURSE PRACTITIONER

## 2019-12-14 PROCEDURE — 94799 UNLISTED PULMONARY SVC/PX: CPT

## 2019-12-14 PROCEDURE — 25010000002 METHYLPREDNISOLONE PER 40 MG: Performed by: NURSE PRACTITIONER

## 2019-12-14 PROCEDURE — 80048 BASIC METABOLIC PNL TOTAL CA: CPT | Performed by: NURSE PRACTITIONER

## 2019-12-14 PROCEDURE — 85007 BL SMEAR W/DIFF WBC COUNT: CPT | Performed by: NURSE PRACTITIONER

## 2019-12-14 PROCEDURE — 82962 GLUCOSE BLOOD TEST: CPT

## 2019-12-14 PROCEDURE — 99239 HOSP IP/OBS DSCHRG MGMT >30: CPT | Performed by: NURSE PRACTITIONER

## 2019-12-14 PROCEDURE — 86140 C-REACTIVE PROTEIN: CPT | Performed by: NURSE PRACTITIONER

## 2019-12-14 PROCEDURE — 85025 COMPLETE CBC W/AUTO DIFF WBC: CPT | Performed by: NURSE PRACTITIONER

## 2019-12-14 RX ORDER — BUDESONIDE AND FORMOTEROL FUMARATE DIHYDRATE 80; 4.5 UG/1; UG/1
2 AEROSOL RESPIRATORY (INHALATION)
Status: DISCONTINUED | OUTPATIENT
Start: 2019-12-14 | End: 2019-12-14 | Stop reason: HOSPADM

## 2019-12-14 RX ORDER — PREDNISONE 20 MG/1
TABLET ORAL
Qty: 10 TABLET | Refills: 0 | Status: ON HOLD | OUTPATIENT
Start: 2019-12-14 | End: 2021-11-21

## 2019-12-14 RX ORDER — ACETAMINOPHEN 325 MG/1
650 TABLET ORAL ONCE
Status: COMPLETED | OUTPATIENT
Start: 2019-12-14 | End: 2019-12-14

## 2019-12-14 RX ORDER — IPRATROPIUM BROMIDE AND ALBUTEROL SULFATE 2.5; .5 MG/3ML; MG/3ML
3 SOLUTION RESPIRATORY (INHALATION) EVERY 4 HOURS PRN
Qty: 360 ML | Refills: 0 | Status: ON HOLD | OUTPATIENT
Start: 2019-12-14 | End: 2021-11-21

## 2019-12-14 RX ORDER — DOXYCYCLINE 100 MG/1
CAPSULE ORAL
Qty: 14 CAPSULE | Refills: 0 | Status: ON HOLD | OUTPATIENT
Start: 2019-12-14 | End: 2021-11-21

## 2019-12-14 RX ORDER — PANTOPRAZOLE SODIUM 40 MG/1
40 TABLET, DELAYED RELEASE ORAL DAILY
Qty: 4 TABLET | Refills: 0 | Status: SHIPPED | OUTPATIENT
Start: 2019-12-14 | End: 2019-12-18

## 2019-12-14 RX ORDER — CEFDINIR 300 MG/1
300 CAPSULE ORAL 2 TIMES DAILY
Qty: 8 CAPSULE | Refills: 0 | Status: SHIPPED | OUTPATIENT
Start: 2019-12-14 | End: 2019-12-18

## 2019-12-14 RX ORDER — DOXYCYCLINE HYCLATE 100 MG/1
100 CAPSULE ORAL 2 TIMES DAILY
Qty: 8 CAPSULE | Refills: 0 | Status: SHIPPED | OUTPATIENT
Start: 2019-12-14 | End: 2019-12-18

## 2019-12-14 RX ORDER — PREDNISONE 20 MG/1
40 TABLET ORAL
Qty: 8 TABLET | Refills: 0 | Status: SHIPPED | OUTPATIENT
Start: 2019-12-14 | End: 2019-12-18

## 2019-12-14 RX ORDER — PREDNISONE 20 MG/1
40 TABLET ORAL
Status: DISCONTINUED | OUTPATIENT
Start: 2019-12-14 | End: 2019-12-14 | Stop reason: HOSPADM

## 2019-12-14 RX ORDER — BUDESONIDE AND FORMOTEROL FUMARATE DIHYDRATE 80; 4.5 UG/1; UG/1
2 AEROSOL RESPIRATORY (INHALATION)
Qty: 10.2 G | Refills: 0 | Status: ON HOLD | OUTPATIENT
Start: 2019-12-14 | End: 2021-11-21

## 2019-12-14 RX ORDER — NICOTINE 21 MG/24HR
1 PATCH, TRANSDERMAL 24 HOURS TRANSDERMAL
Qty: 30 PATCH | Refills: 0 | Status: ON HOLD | OUTPATIENT
Start: 2019-12-14 | End: 2021-11-21

## 2019-12-14 RX ORDER — IPRATROPIUM BROMIDE AND ALBUTEROL SULFATE 2.5; .5 MG/3ML; MG/3ML
SOLUTION RESPIRATORY (INHALATION)
Qty: 18 ML | Refills: 0 | Status: ON HOLD | OUTPATIENT
Start: 2019-12-14 | End: 2021-11-21

## 2019-12-14 RX ADMIN — IPRATROPIUM BROMIDE AND ALBUTEROL SULFATE 3 ML: 2.5; .5 SOLUTION RESPIRATORY (INHALATION) at 06:50

## 2019-12-14 RX ADMIN — ACETAMINOPHEN 650 MG: 325 TABLET ORAL at 01:04

## 2019-12-14 RX ADMIN — METHYLPREDNISOLONE SODIUM SUCCINATE 20 MG: 40 INJECTION, POWDER, FOR SOLUTION INTRAMUSCULAR; INTRAVENOUS at 05:27

## 2019-12-14 RX ADMIN — GUAIFENESIN 600 MG: 600 TABLET, EXTENDED RELEASE ORAL at 10:02

## 2019-12-14 RX ADMIN — ASPIRIN AND EXTENDED-RELEASE DIPYRIDAMOLE 1 CAPSULE: 25; 200 CAPSULE ORAL at 10:03

## 2019-12-14 RX ADMIN — PREDNISONE 40 MG: 20 TABLET ORAL at 10:02

## 2019-12-14 RX ADMIN — DOXYCYCLINE 100 MG: 100 INJECTION, POWDER, LYOPHILIZED, FOR SOLUTION INTRAVENOUS at 00:13

## 2019-12-14 RX ADMIN — SODIUM CHLORIDE, PRESERVATIVE FREE 10 ML: 5 INJECTION INTRAVENOUS at 10:03

## 2019-12-14 NOTE — DISCHARGE SUMMARY
[facility]    Patient Identification:  Name:  Florinda Barr  Age:  64 y.o.  Sex:  female  :  1955  MRN:  [id number]  Visit Number:  [id number]    Date of Admission: 2019  Date of Discharge:  2019     PCP: Paulina Rod MD    DISCHARGE DIAGNOSES    Sepsis r/t Bilateral CAP +Mycoplasma   Acute Hypoxic respiratory failure   Macrocytic anemia   Mild Hyperglycemia, A1c 5.60  Hx of CVA   Tobacco use    CONSULTS     None     PROCEDURES PERFORMED    CT of the chest with PE protocol, 19      HPI:      Mrs. Barr is a 64 year old female patient who was admitted on 19 for Sepsis r/t CAP, +Mycoplasma. She reports her only significant past medical history to be CVA , hyperlipidemia, and tobacco use.     HOSPITAL COURSE    Mrs. Barr was admitted to the medical floor and started on rocephin and doxycycline for her pna. She was found to be mycoplasma positive.  Chest x-ray on admission showed left upper lobe airspace disease, patchy bibasilar airspace disease and fibrosis.  CT with PE protocol was also done, study was negative for PE, scattered tree-in-bud opacities throughout both lungs with some more confluent areas of consolidation of the left upper and anterior right lobes that may represent multifocal pneumonia.  Blood cultures show no growth to date at discharge. Final Respiratory culture showed scant growth with normal deann. Respiratory Panel PCR was negative on admission.  She was also treated with duo nebs, Mucinex and Solu-Medrol IV while hospitalized.  She has been afebrile, her hospital stay has been uneventful.    Discharge:     Mrs. Barr will be discharged home today in stable condition.  Today at discharge I have started her on Symbicort, I have educated the patient on the use of this inhaler, and the importance of rinsing her mouth after each use.  She tells me she does have a nebulizer machine at home, I have ordered duo nebs and  explained the use of those at home. She does have a rescue inhaler with her and reports she does not need a refill on this today at discharge.  She does qualify for home oxygen when up, her pulse ox on room air dropped to 86% while ambulating, I have arranged oxygen therapy for her to be set up before she leaves today.  I have also placed an outpatient follow-up with pulmonary, for further evaluation of her likely COPD.  I discussed the importance of keeping follow-up appointments for further evaluation of likely COPD and fibrosis as noted on her chest x-ray.  Would recommend a repeat chest x-ray in 6 weeks, and yearly CT of the chest with low-dose for lung cancer screening given her extensive smoking history.  Today at discharge I have also discussed the importance of recognizing her respiratory symptoms early and notifying her PCP and starting treatment. At discharge today she will have 4 days of omnicef, doxycycline and 4 days of prednisone to be completed once discharged, she verbalized understanding.  She was agreeable to be prescribed NicoDerm patches, I have educated and stressed the importance of stopping smoking. She was sent home with a COPD rescue kit today at discharge.        VITAL SIGNS:  Temp:  [97.7 °F (36.5 °C)-98.4 °F (36.9 °C)] 98.2 °F (36.8 °C)  Heart Rate:  [] 82  Resp:  [18-20] 20  BP: (125-165)/(63-78) 157/78  SpO2:  [92 %-96 %] 94 %  on  Flow (L/min):  [1] 1;   Device (Oxygen Therapy): room air    Body mass index is 19.28 kg/m².  Wt Readings from Last 3 Encounters:   12/14/19 46.3 kg (102 lb)   06/22/16 44.9 kg (99 lb)                 PHYSICAL EXAM:    Constitutional:  Thin female on room air, in no acute distress  HENT:  Head:  Normocephalic and atraumatic.  Mouth:  Moist mucous membranes.    Eyes:   Pupils are equal, round, and reactive to light.  No scleral icterus.    Neck:  Neck supple.     Cardiovascular:  Normal rate, regular rhythm and normal heart sounds with no  murmur.  Pulmonary/Chest:  Diminished lower lobes, with some fine crackles in the upper lobes, no expiratory wheezing  Abdominal:  Soft.  Bowel sounds are normal.  No distension and no tenderness.   Musculoskeletal:  No edema, no tenderness, and no deformity.  No red or swollen joints anywhere.    Neurological:  Alert and oriented to person, place, and time.  No tongue deviation.  No facial droop.  No slurred speech.   Skin:  Skin is warm and dry. No rash noted. No pallor.     DISCHARGE DISPOSITION   Stable    DISCHARGE MEDICATIONS:     Discharge Medications      New Medications      Instructions Start Date   budesonide-formoterol 80-4.5 MCG/ACT inhaler  Commonly known as:  SYMBICORT   2 puffs, Inhalation, 2 Times Daily - RT      cefdinir 300 MG capsule  Commonly known as:  OMNICEF   300 mg, Oral, 2 Times Daily      doxycycline 100 MG capsule  Commonly known as:  VIBRAMYCIN   100 mg, Oral, 2 Times Daily      doxycycline 100 MG capsule  Commonly known as:  MONODOX   Take 1 capsule by mouth every 12 hours for 7 days as part of COPD Rescue Kit. (Only Start if in YELLOW ZONE.)      ipratropium-albuterol 0.5-2.5 mg/3 ml nebulizer  Commonly known as:  DUO-NEB   3 mL, Nebulization, Every 4 Hours PRN      ipratropium-albuterol 0.5-2.5 mg/3 ml nebulizer  Commonly known as:  DUO-NEB   Take 3 mL by neb every 30 minutes as needed for shortness of air for up to 6 doses. Part of COPD Rescue Kit. (Only Start if in YELLOW ZONE.)      nicotine 21 MG/24HR patch  Commonly known as:  NICODERM CQ   1 patch, Transdermal, Every 24 Hours Scheduled      pantoprazole 40 MG EC tablet  Commonly known as:  PROTONIX   40 mg, Oral, Daily      predniSONE 20 MG tablet  Commonly known as:  DELTASONE   40 mg, Oral, Daily With Breakfast      predniSONE 20 MG tablet  Commonly known as:  DELTASONE   Take 2 tablets daily for 5 days as part of COPD Rescue Kit. (Only Start if in YELLOW ZONE.)         Continue These Medications      Instructions Start Date    aspirin-dipyridamole  MG per 12 hr capsule  Commonly known as:  AGGRENOX   1 capsule, Oral, 2 Times Daily      coenzyme Q10 100 MG capsule   100 mg, Oral, Every Other Day      folic acid 400 MCG tablet  Commonly known as:  FOLVITE   400 mcg, Oral, Every Other Day      rosuvastatin 40 MG tablet  Commonly known as:  CRESTOR   40 mg, Oral, Every Other Day             Diet Instructions     Diet as Tolerated             Activity Instructions     Activity as Tolerated             Additional Instructions for the Follow-ups that You Need to Schedule     Discharge Follow-up with PCP   As directed       Currently Documented PCP:    Paulina Rod MD    PCP Phone Number:    [phone]     Follow Up Details:  2-3 days, follow up on PNA, COPD exacerbation,         Discharge Follow-up with Specified Provider: Pulmonary, COPD optimization, PFT; 2 Weeks   As directed      To:  Pulmonary, COPD optimization, PFT    Follow Up:  2 Weeks           Follow-up Information     Paulina Rod MD .    Specialty:  Internal Medicine  Why:  2-3 days, follow up on PNA, COPD exacerbation,  Contact information:  [address] [place] [id number]  [phone]                    TEST  RESULTS PENDING AT DISCHARGE   Order Current Status    Blood Culture - Blood, Arm, Left Preliminary result    Blood Culture - Blood, Arm, Right Preliminary result           CODE STATUS  Code Status and Medical Interventions:   Ordered at: 12/11/19 1233     Code Status:    CPR     Medical Interventions (Level of Support Prior to Arrest):    Full       CARMELO Duvall  [facility] Hospitalist  12/14/19  9:42 AM    Please note that this discharge summary required more than 30 minutes to complete.

## 2019-12-14 NOTE — NURSING NOTE
Home oxygen arranged per MD order via [employer]. Karon (DME with [employer]) delivered 02 to patient's room. Facesheet, MD order for 02 & nursing note with qualifying 02 sat given to Karon at time of equipment delivery.

## 2019-12-15 NOTE — PAYOR COMM NOTE
"[facility] [place]  PHONE  [phone]  FAX  [phone]  NPI:  [id number]    PATIENT D/C 12/14/19    Florinda Cota (64 y.o. Female)     Date of Birth Social Security Number Address Home Phone MRN    1955  [address]  [place] [address] [phone] [id number]    Church Marital Status          None        Admission Date Admission Type Admitting Provider Attending Provider Department, Room/Bed    12[facility]11[facility]19 [facility]    Discharge Date Discharge Disposition Discharge Destination        12/14/2019 Home or Self Care              Attending Provider:  (none)   Allergies:  Codeine, Latex    Isolation:  Droplet   Infection:  Mycoplasma pneumonia (12/11/19)   Code Status:  Prior    Ht:  154.9 cm (60.98\")   Wt:  46.3 kg (102 lb)    Admission Cmt:  None   Principal Problem:  None                Active Insurance as of 12/11/2019     Primary Coverage     Payor Plan Insurance Group Employer/Plan Group    MEDICARE MEDICARE A & B      Payor Plan Address Payor Plan Phone Number Payor Plan Fax Number Effective Dates    [address] [phone]  5/1/2005 - None Entered    [facility] [id number]       Subscriber Name Subscriber Birth Date Member ID       FLORINDA COTA 1955 [id number]           Secondary Coverage     Payor Plan Insurance Group Employer/Plan Group    [place]      Payor Plan Address Payor Plan Phone Number Payor Plan Fax Number Effective Dates    [address] [address] [phone]  6/22/2016 - None Entered    [facility] [id number]       Subscriber Name Subscriber Birth Date Member ID       FLORINDA COTA 1955 [id number]                 Emergency Contacts      (Rel.) Home Phone Work Phone Mobile Phone    Tyra Nicholas (Daughter) [phone] -- --              "

## 2019-12-16 LAB
BACTERIA SPEC AEROBE CULT: NORMAL
BACTERIA SPEC AEROBE CULT: NORMAL

## 2019-12-19 ENCOUNTER — HOSPITAL ENCOUNTER (OUTPATIENT)
Dept: GENERAL RADIOLOGY | Facility: HOSPITAL | Age: 64
Discharge: HOME | End: 2019-12-19
Admitting: NURSE PRACTITIONER

## 2019-12-19 ENCOUNTER — TRANSCRIBE ORDERS (OUTPATIENT)
Dept: ADMINISTRATIVE | Facility: HOSPITAL | Age: 64
End: 2019-12-19

## 2019-12-19 DIAGNOSIS — J18.9 PNEUMONIA DUE TO INFECTIOUS ORGANISM, UNSPECIFIED LATERALITY, UNSPECIFIED PART OF LUNG: ICD-10-CM

## 2019-12-19 DIAGNOSIS — J18.9 PNEUMONIA DUE TO INFECTIOUS ORGANISM, UNSPECIFIED LATERALITY, UNSPECIFIED PART OF LUNG: Primary | ICD-10-CM

## 2019-12-19 PROCEDURE — 71046 X-RAY EXAM CHEST 2 VIEWS: CPT

## 2019-12-19 PROCEDURE — 71046 X-RAY EXAM CHEST 2 VIEWS: CPT | Performed by: RADIOLOGY

## 2021-02-08 ENCOUNTER — OFFICE VISIT (OUTPATIENT)
Dept: CARDIOLOGY | Facility: CLINIC | Age: 66
End: 2021-02-08

## 2021-02-08 VITALS
HEART RATE: 93 BPM | BODY MASS INDEX: 17.9 KG/M2 | DIASTOLIC BLOOD PRESSURE: 77 MMHG | SYSTOLIC BLOOD PRESSURE: 126 MMHG | HEIGHT: 61 IN | OXYGEN SATURATION: 93 % | WEIGHT: 94.8 LBS

## 2021-02-08 DIAGNOSIS — I73.9 PVD (PERIPHERAL VASCULAR DISEASE) (HCC): ICD-10-CM

## 2021-02-08 DIAGNOSIS — E78.5 DYSLIPIDEMIA: ICD-10-CM

## 2021-02-08 DIAGNOSIS — R06.09 DYSPNEA ON EXERTION: ICD-10-CM

## 2021-02-08 DIAGNOSIS — R68.89 ABNORMAL ANKLE BRACHIAL INDEX (ABI): Primary | ICD-10-CM

## 2021-02-08 DIAGNOSIS — I70.212 ATHEROSCLEROSIS OF NATIVE ARTERY OF LEFT LOWER EXTREMITY WITH INTERMITTENT CLAUDICATION (HCC): ICD-10-CM

## 2021-02-08 PROCEDURE — 99204 OFFICE O/P NEW MOD 45 MIN: CPT | Performed by: INTERNAL MEDICINE

## 2021-02-08 RX ORDER — ERGOCALCIFEROL 1.25 MG/1
50000 CAPSULE ORAL WEEKLY
Status: ON HOLD | COMMUNITY
Start: 2021-02-01 | End: 2021-11-21

## 2021-02-08 NOTE — PROGRESS NOTES
"Chief Complaint  Peripheral Vascular Disease and Abnormal SAMUEL    Subjective         Florinda Barr presents to [facility] CARDIOLOGY for consult abnormal SAMUEL.  Referred from Falguni Graff DPM  History of Present Illness   She presents with bilateral leg pain. She has more pain in the left than right.   SAMUEL.  She has a history of CVA. Denies any complaints since CVA  PAD:  with initial claudication. Presents with lower extremity pain and intermittent claudication with rest pain bilateral lower extremity. Only able to walk about 50 feet then has to take a break before continuing. Feet cold to touch,  SAMUEL 12/11/2021 showed:  Right SAMUEL 0.97  Right TBI 0.99.  Left SAMUEL 0.40  Left TBI 0.99.   Smoking:  Patient is a current every day smoker.  She smokes 0.5 - 1 ppd.  States that she denies to quit at this time.    The chart, PMH, FMH, social history, PSH, and medications were reviewed today. Problems above addressed this visit.    Objective     Vital Signs:   /77 (BP Location: Left arm)   Pulse 93   Ht 154.9 cm (61\")   Wt 43 kg (94 lb 12.8 oz)   SpO2 93%   BMI 17.91 kg/m²       Physical Exam  Constitutional:       Appearance: Normal appearance.   HENT:      Head: Normocephalic.      Nose: Nose normal.      Mouth/Throat:      Pharynx: Oropharynx is clear.   Eyes:      Pupils: Pupils are equal, round, and reactive to light.   Neck:      Musculoskeletal: Neck supple. No muscular tenderness.      Vascular: No carotid bruit.   Cardiovascular:      Rate and Rhythm: Normal rate and regular rhythm.      Pulses: Normal pulses.      Heart sounds: Normal heart sounds. No murmur.   Pulmonary:      Effort: Pulmonary effort is normal. No respiratory distress.      Breath sounds: Normal breath sounds.   Abdominal:      General: Bowel sounds are normal.      Palpations: Abdomen is soft.   Musculoskeletal:         General: No swelling.   Skin:     Findings: No bruising.   Neurological:      General: No focal deficit " present.      Mental Status: She is alert and oriented to person, place, and time.   Psychiatric:         Mood and Affect: Mood normal.         Behavior: Behavior normal.         Judgment: Judgment normal.          Result Review :  {Data reviewed: Cardiology studies 02/08/2021         Assessment  Abnormal SAMUEL  Atherosclerosis of native artery with claudication and rest pain.   Dyspnea on exertion  Dyslipidemia labs are pending fax  Chronic tobacco abuse      Plan  Proceed with an echocardiogram, CTA with runoff, and arterial US  Start Pletal 50 mg BID  Follow up in 3 month or sooner if necessary    I extensively discussed consequences of smoking namely cardiovascular/metabolic, lung cancer, mouth cancer, pulmonary disorder including COPD and reduced quality of life.  At the end of the conversation, patient voices understanding of the risk involved in smoking.  Patient also understands the benefits of quitting.  I provided the patient smoking cessation material. Patient displayed understanding and stated that she denies to quit smoking.  During this visit I spent 5 minutes counseling the patient regarding the smoking cessation.      Problem List Items Addressed This Visit     None      Visit Diagnoses     Abnormal ankle brachial index (SAMUEL)    -  Primary    Relevant Orders    Adult Transthoracic Echo Complete W/ Cont if Necessary Per Protocol    CT Angio Abdominal Aorta Bilateral Iliofem Runoff    US Arterial Doppler Lower Extremity Bilateral    PVD (peripheral vascular disease) (CMS/HCC)        Relevant Orders    Adult Transthoracic Echo Complete W/ Cont if Necessary Per Protocol    CT Angio Abdominal Aorta Bilateral Iliofem Runoff    US Arterial Doppler Lower Extremity Bilateral    Dyslipidemia        Relevant Orders    Adult Transthoracic Echo Complete W/ Cont if Necessary Per Protocol    CT Angio Abdominal Aorta Bilateral Iliofem Runoff    US Arterial Doppler Lower Extremity Bilateral    Dyspnea on exertion         Relevant Orders    Adult Transthoracic Echo Complete W/ Cont if Necessary Per Protocol    CT Angio Abdominal Aorta Bilateral Iliofem Runoff    US Arterial Doppler Lower Extremity Bilateral    Atherosclerosis of native artery of left lower extremity with intermittent claudication (CMS/HCC)        Relevant Orders    CT Angio Abdominal Aorta Bilateral Iliofem Runoff    US Arterial Doppler Lower Extremity Bilateral        Follow Up    Return in about 3 months (around 5/8/2021).  Patient was given instructions and counseling regarding her condition or for health maintenance advice. Please see specific information pulled into the AVS if appropriate.             Dae Callahan MD, [facility]  Interventional Cardiology    VIC Tavera, acting as scribe for Dae Callahan MD, [facility]    02/08/21  13:57 EST

## 2021-02-10 RX ORDER — CILOSTAZOL 50 MG/1
50 TABLET ORAL
Qty: 60 TABLET | Refills: 6 | Status: SHIPPED | OUTPATIENT
Start: 2021-02-10 | End: 2021-05-05

## 2021-02-24 ENCOUNTER — APPOINTMENT (OUTPATIENT)
Dept: CT IMAGING | Facility: HOSPITAL | Age: 66
End: 2021-02-24

## 2021-02-24 ENCOUNTER — HOSPITAL ENCOUNTER (OUTPATIENT)
Dept: CARDIOLOGY | Facility: HOSPITAL | Age: 66
Discharge: HOME | End: 2021-02-24

## 2021-02-24 DIAGNOSIS — I73.9 PVD (PERIPHERAL VASCULAR DISEASE) (HCC): ICD-10-CM

## 2021-02-24 DIAGNOSIS — R06.09 DYSPNEA ON EXERTION: ICD-10-CM

## 2021-02-24 DIAGNOSIS — E78.5 DYSLIPIDEMIA: ICD-10-CM

## 2021-02-24 DIAGNOSIS — R68.89 ABNORMAL ANKLE BRACHIAL INDEX (ABI): ICD-10-CM

## 2021-02-24 DIAGNOSIS — I70.212 ATHEROSCLEROSIS OF NATIVE ARTERY OF LEFT LOWER EXTREMITY WITH INTERMITTENT CLAUDICATION (HCC): ICD-10-CM

## 2021-02-24 PROCEDURE — 93306 TTE W/DOPPLER COMPLETE: CPT

## 2021-02-24 PROCEDURE — 93306 TTE W/DOPPLER COMPLETE: CPT | Performed by: INTERNAL MEDICINE

## 2021-02-24 PROCEDURE — 93923 UPR/LXTR ART STDY 3+ LVLS: CPT | Performed by: RADIOLOGY

## 2021-02-24 PROCEDURE — 93923 UPR/LXTR ART STDY 3+ LVLS: CPT

## 2021-02-25 DIAGNOSIS — Z23 IMMUNIZATION DUE: ICD-10-CM

## 2021-02-25 LAB
BH CV ECHO MEAS - % IVS THICK: 8 %
BH CV ECHO MEAS - % LVPW THICK: 35 %
BH CV ECHO MEAS - ACS: 1.9 CM
BH CV ECHO MEAS - AO MAX PG: 3.5 MMHG
BH CV ECHO MEAS - AO MEAN PG: 2 MMHG
BH CV ECHO MEAS - AO ROOT AREA (BSA CORRECTED): 1.8
BH CV ECHO MEAS - AO ROOT AREA: 4.7 CM^2
BH CV ECHO MEAS - AO ROOT DIAM: 2.5 CM
BH CV ECHO MEAS - AO V2 MAX: 93.3 CM/SEC
BH CV ECHO MEAS - AO V2 MEAN: 62.4 CM/SEC
BH CV ECHO MEAS - AO V2 VTI: 24.5 CM
BH CV ECHO MEAS - BSA(HAYCOCK): 1.3 M^2
BH CV ECHO MEAS - BSA: 1.4 M^2
BH CV ECHO MEAS - BZI_BMI: 17.8 KILOGRAMS/M^2
BH CV ECHO MEAS - BZI_METRIC_HEIGHT: 154.9 CM
BH CV ECHO MEAS - BZI_METRIC_WEIGHT: 42.6 KG
BH CV ECHO MEAS - EDV(CUBED): 54.4 ML
BH CV ECHO MEAS - EDV(MOD-SP4): 15 ML
BH CV ECHO MEAS - EDV(TEICH): 61.6 ML
BH CV ECHO MEAS - EF(CUBED): 64.2 %
BH CV ECHO MEAS - EF(MOD-SP4): 61.6 %
BH CV ECHO MEAS - EF(TEICH): 56.5 %
BH CV ECHO MEAS - ESV(CUBED): 19.5 ML
BH CV ECHO MEAS - ESV(MOD-SP4): 5.8 ML
BH CV ECHO MEAS - ESV(TEICH): 26.8 ML
BH CV ECHO MEAS - FS: 29 %
BH CV ECHO MEAS - IVS/LVPW: 0.94
BH CV ECHO MEAS - IVSD: 0.85 CM
BH CV ECHO MEAS - IVSS: 0.92 CM
BH CV ECHO MEAS - LA DIMENSION: 2.1 CM
BH CV ECHO MEAS - LA/AO: 0.86
BH CV ECHO MEAS - LV DIASTOLIC VOL/BSA (35-75): 10.9 ML/M^2
BH CV ECHO MEAS - LV MASS(C)D: 97.9 GRAMS
BH CV ECHO MEAS - LV MASS(C)DI: 71.4 GRAMS/M^2
BH CV ECHO MEAS - LV MASS(C)S: 78.9 GRAMS
BH CV ECHO MEAS - LV MASS(C)SI: 57.5 GRAMS/M^2
BH CV ECHO MEAS - LV SYSTOLIC VOL/BSA (12-30): 4.2 ML/M^2
BH CV ECHO MEAS - LVIDD: 3.8 CM
BH CV ECHO MEAS - LVIDS: 2.7 CM
BH CV ECHO MEAS - LVLD AP4: 5.3 CM
BH CV ECHO MEAS - LVLS AP4: 4.4 CM
BH CV ECHO MEAS - LVOT AREA (M): 2 CM^2
BH CV ECHO MEAS - LVOT AREA: 2 CM^2
BH CV ECHO MEAS - LVOT DIAM: 1.6 CM
BH CV ECHO MEAS - LVPWD: 0.91 CM
BH CV ECHO MEAS - LVPWS: 1.2 CM
BH CV ECHO MEAS - MV A MAX VEL: 85.1 CM/SEC
BH CV ECHO MEAS - MV E MAX VEL: 97.9 CM/SEC
BH CV ECHO MEAS - MV E/A: 1.2
BH CV ECHO MEAS - PA ACC TIME: 0.17 SEC
BH CV ECHO MEAS - PA PR(ACCEL): 4.8 MMHG
BH CV ECHO MEAS - SI(AO): 84.3 ML/M^2
BH CV ECHO MEAS - SI(CUBED): 25.5 ML/M^2
BH CV ECHO MEAS - SI(MOD-SP4): 6.7 ML/M^2
BH CV ECHO MEAS - SI(TEICH): 25.4 ML/M^2
BH CV ECHO MEAS - SV(AO): 115.5 ML
BH CV ECHO MEAS - SV(CUBED): 35 ML
BH CV ECHO MEAS - SV(MOD-SP4): 9.2 ML
BH CV ECHO MEAS - SV(TEICH): 34.8 ML
MAXIMAL PREDICTED HEART RATE: 154 BPM
STRESS TARGET HR: 131 BPM

## 2021-03-01 ENCOUNTER — TELEPHONE (OUTPATIENT)
Dept: CARDIOLOGY | Facility: CLINIC | Age: 66
End: 2021-03-01

## 2021-03-01 NOTE — TELEPHONE ENCOUNTER
Called patient to let her know that echo was normal. She is aware and understands. She is also requesting results on samara. I have not gotten these yet from Dr. Zuleta, but I have messaged him to get these. I told her I would call her back with results.

## 2021-03-01 NOTE — TELEPHONE ENCOUNTER
----- Message from Dae Callahan MD sent at 2/26/2021  3:25 PM EST -----  Please call patient to and tell him his test was normal.   Thanks.

## 2021-03-01 NOTE — TELEPHONE ENCOUNTER
Per Dr. Zuleta, schedule for peripheral. I have notified Linda, his procedure  to let her know results.

## 2021-03-04 ENCOUNTER — TELEPHONE (OUTPATIENT)
Dept: CARDIOLOGY | Facility: CLINIC | Age: 66
End: 2021-03-04

## 2021-03-04 NOTE — TELEPHONE ENCOUNTER
Spoke with Florinda CTA is now approved.  Will proceed with CTA instead of peripheral angiogram per Dr. Zuleta.    Approval # [id number]

## 2021-03-11 ENCOUNTER — HOSPITAL ENCOUNTER (OUTPATIENT)
Dept: CT IMAGING | Facility: HOSPITAL | Age: 66
Discharge: HOME | End: 2021-03-11
Admitting: INTERNAL MEDICINE

## 2021-03-11 DIAGNOSIS — I70.212 ATHEROSCLEROSIS OF NATIVE ARTERY OF LEFT LOWER EXTREMITY WITH INTERMITTENT CLAUDICATION (HCC): ICD-10-CM

## 2021-03-11 DIAGNOSIS — I73.9 PVD (PERIPHERAL VASCULAR DISEASE) (HCC): ICD-10-CM

## 2021-03-11 DIAGNOSIS — E78.5 DYSLIPIDEMIA: ICD-10-CM

## 2021-03-11 DIAGNOSIS — R68.89 ABNORMAL ANKLE BRACHIAL INDEX (ABI): ICD-10-CM

## 2021-03-11 DIAGNOSIS — R06.09 DYSPNEA ON EXERTION: ICD-10-CM

## 2021-03-11 LAB — CREAT BLDA-MCNC: 0.6 MG/DL (ref 0.6–1.3)

## 2021-03-11 PROCEDURE — 75635 CT ANGIO ABDOMINAL ARTERIES: CPT

## 2021-03-11 PROCEDURE — 25010000002 IOPAMIDOL 61 % SOLUTION: Performed by: INTERNAL MEDICINE

## 2021-03-11 PROCEDURE — 75635 CT ANGIO ABDOMINAL ARTERIES: CPT | Performed by: RADIOLOGY

## 2021-03-11 PROCEDURE — 82565 ASSAY OF CREATININE: CPT

## 2021-03-11 RX ADMIN — IOPAMIDOL 100 ML: 612 INJECTION, SOLUTION INTRAVENOUS at 09:07

## 2021-03-15 ENCOUNTER — TELEPHONE (OUTPATIENT)
Dept: CARDIOLOGY | Facility: CLINIC | Age: 66
End: 2021-03-15

## 2021-03-15 NOTE — TELEPHONE ENCOUNTER
Called patient back to let her know that her CTA was still considered abnormal and that Dr. Zuleta would like to proceed with peripheral. I told her Linda would be in touch with he about date. She is aware and understands.

## 2021-03-15 NOTE — TELEPHONE ENCOUNTER
----- Message from Dae Callahan MD sent at 3/14/2021  3:32 PM EDT -----  I think we are already working on schduling her for peripheral angio

## 2021-03-17 DIAGNOSIS — Z01.818 OTHER SPECIFIED PRE-OPERATIVE EXAMINATION: ICD-10-CM

## 2021-03-17 DIAGNOSIS — R68.89 ABNORMAL ANKLE BRACHIAL INDEX (ABI): Primary | ICD-10-CM

## 2021-03-17 DIAGNOSIS — I70.212 ATHEROSCLEROSIS OF NATIVE ARTERY OF LEFT LOWER EXTREMITY WITH INTERMITTENT CLAUDICATION (HCC): ICD-10-CM

## 2021-03-17 DIAGNOSIS — I73.9 PVD (PERIPHERAL VASCULAR DISEASE) (HCC): ICD-10-CM

## 2021-03-19 PROBLEM — R68.89 ABNORMAL ANKLE BRACHIAL INDEX (ABI): Status: ACTIVE | Noted: 2021-03-19

## 2021-03-19 PROBLEM — I73.9 PVD (PERIPHERAL VASCULAR DISEASE): Status: ACTIVE | Noted: 2021-03-19

## 2021-03-19 PROBLEM — I70.212 ATHEROSCLEROSIS OF NATIVE ARTERY OF LEFT LOWER EXTREMITY WITH INTERMITTENT CLAUDICATION (HCC): Status: ACTIVE | Noted: 2021-03-19

## 2021-03-23 ENCOUNTER — TRANSCRIBE ORDERS (OUTPATIENT)
Dept: ADMINISTRATIVE | Facility: HOSPITAL | Age: 66
End: 2021-03-23

## 2021-03-23 DIAGNOSIS — Z01.818 PRE-OPERATIVE CLEARANCE: Primary | ICD-10-CM

## 2021-03-26 ENCOUNTER — LAB (OUTPATIENT)
Dept: LAB | Facility: HOSPITAL | Age: 66
End: 2021-03-26

## 2021-03-26 DIAGNOSIS — Z01.818 OTHER SPECIFIED PRE-OPERATIVE EXAMINATION: ICD-10-CM

## 2021-04-02 ENCOUNTER — LAB (OUTPATIENT)
Dept: LAB | Facility: HOSPITAL | Age: 66
End: 2021-04-02

## 2021-04-02 DIAGNOSIS — Z01.818 PRE-OPERATIVE CLEARANCE: ICD-10-CM

## 2021-04-02 PROCEDURE — U0004 COV-19 TEST NON-CDC HGH THRU: HCPCS | Performed by: INTERNAL MEDICINE

## 2021-04-02 PROCEDURE — U0005 INFEC AGEN DETEC AMPLI PROBE: HCPCS | Performed by: INTERNAL MEDICINE

## 2021-04-02 PROCEDURE — C9803 HOPD COVID-19 SPEC COLLECT: HCPCS | Performed by: INTERNAL MEDICINE

## 2021-04-03 LAB — SARS-COV-2 RNA NOSE QL NAA+PROBE: NOT DETECTED

## 2021-04-06 ENCOUNTER — HOSPITAL ENCOUNTER (OUTPATIENT)
Facility: HOSPITAL | Age: 66
Setting detail: HOSPITAL OUTPATIENT SURGERY
Discharge: HOME | End: 2021-04-06
Attending: INTERNAL MEDICINE | Admitting: INTERNAL MEDICINE

## 2021-04-06 VITALS
OXYGEN SATURATION: 95 % | WEIGHT: 94 LBS | HEART RATE: 72 BPM | DIASTOLIC BLOOD PRESSURE: 74 MMHG | RESPIRATION RATE: 18 BRPM | HEIGHT: 61 IN | BODY MASS INDEX: 17.75 KG/M2 | SYSTOLIC BLOOD PRESSURE: 115 MMHG | TEMPERATURE: 97.9 F

## 2021-04-06 DIAGNOSIS — R68.89 ABNORMAL ANKLE BRACHIAL INDEX (ABI): ICD-10-CM

## 2021-04-06 DIAGNOSIS — I70.212 ATHEROSCLEROSIS OF NATIVE ARTERY OF LEFT LOWER EXTREMITY WITH INTERMITTENT CLAUDICATION (HCC): ICD-10-CM

## 2021-04-06 DIAGNOSIS — I73.9 PVD (PERIPHERAL VASCULAR DISEASE) (HCC): ICD-10-CM

## 2021-04-06 DIAGNOSIS — I73.9 PVD (PERIPHERAL VASCULAR DISEASE) (HCC): Primary | ICD-10-CM

## 2021-04-06 LAB
ALBUMIN SERPL-MCNC: 4.43 G/DL (ref 3.5–5.2)
ALBUMIN/GLOB SERPL: 1.4 G/DL
ALP SERPL-CCNC: 69 U/L (ref 39–117)
ALT SERPL W P-5'-P-CCNC: 7 U/L (ref 1–33)
ANION GAP SERPL CALCULATED.3IONS-SCNC: 9.2 MMOL/L (ref 5–15)
AST SERPL-CCNC: 15 U/L (ref 1–32)
BASOPHILS # BLD AUTO: 0.05 10*3/MM3 (ref 0–0.2)
BASOPHILS NFR BLD AUTO: 1.2 % (ref 0–1.5)
BILIRUB SERPL-MCNC: 0.4 MG/DL (ref 0–1.2)
BUN SERPL-MCNC: 22 MG/DL (ref 8–23)
BUN/CREAT SERPL: 32.8 (ref 7–25)
CALCIUM SPEC-SCNC: 9.5 MG/DL (ref 8.6–10.5)
CHLORIDE SERPL-SCNC: 105 MMOL/L (ref 98–107)
CO2 SERPL-SCNC: 25.8 MMOL/L (ref 22–29)
CREAT SERPL-MCNC: 0.67 MG/DL (ref 0.57–1)
DEPRECATED RDW RBC AUTO: 49.9 FL (ref 37–54)
EOSINOPHIL # BLD AUTO: 0.05 10*3/MM3 (ref 0–0.4)
EOSINOPHIL NFR BLD AUTO: 1.2 % (ref 0.3–6.2)
ERYTHROCYTE [DISTWIDTH] IN BLOOD BY AUTOMATED COUNT: 13 % (ref 12.3–15.4)
GFR SERPL CREATININE-BSD FRML MDRD: 88 ML/MIN/1.73
GLOBULIN UR ELPH-MCNC: 3.1 GM/DL
GLUCOSE SERPL-MCNC: 97 MG/DL (ref 65–99)
HCT VFR BLD AUTO: 47.3 % (ref 34–46.6)
HGB BLD-MCNC: 15.5 G/DL (ref 12–15.9)
IMM GRANULOCYTES # BLD AUTO: 0.01 10*3/MM3 (ref 0–0.05)
IMM GRANULOCYTES NFR BLD AUTO: 0.2 % (ref 0–0.5)
INR PPP: 0.92 (ref 0.9–1.1)
LYMPHOCYTES # BLD AUTO: 1.7 10*3/MM3 (ref 0.7–3.1)
LYMPHOCYTES NFR BLD AUTO: 40.3 % (ref 19.6–45.3)
MCH RBC QN AUTO: 34.1 PG (ref 26.6–33)
MCHC RBC AUTO-ENTMCNC: 32.8 G/DL (ref 31.5–35.7)
MCV RBC AUTO: 104 FL (ref 79–97)
MONOCYTES # BLD AUTO: 0.37 10*3/MM3 (ref 0.1–0.9)
MONOCYTES NFR BLD AUTO: 8.8 % (ref 5–12)
NEUTROPHILS NFR BLD AUTO: 2.04 10*3/MM3 (ref 1.7–7)
NEUTROPHILS NFR BLD AUTO: 48.3 % (ref 42.7–76)
NRBC BLD AUTO-RTO: 0 /100 WBC (ref 0–0.2)
PLATELET # BLD AUTO: 165 10*3/MM3 (ref 140–450)
PMV BLD AUTO: 10.2 FL (ref 6–12)
POTASSIUM SERPL-SCNC: 3.9 MMOL/L (ref 3.5–5.2)
PROT SERPL-MCNC: 7.5 G/DL (ref 6–8.5)
PROTHROMBIN TIME: 12.2 SECONDS (ref 11.9–14.1)
RBC # BLD AUTO: 4.55 10*6/MM3 (ref 3.77–5.28)
SODIUM SERPL-SCNC: 140 MMOL/L (ref 136–145)
WBC # BLD AUTO: 4.22 10*3/MM3 (ref 3.4–10.8)

## 2021-04-06 PROCEDURE — 25010000002 IOPAMIDOL 61 % SOLUTION: Performed by: INTERNAL MEDICINE

## 2021-04-06 PROCEDURE — 75716 ARTERY X-RAYS ARMS/LEGS: CPT | Performed by: INTERNAL MEDICINE

## 2021-04-06 PROCEDURE — C1894 INTRO/SHEATH, NON-LASER: HCPCS | Performed by: INTERNAL MEDICINE

## 2021-04-06 PROCEDURE — 36140 INTRO NDL ICATH UPR/LXTR ART: CPT | Performed by: INTERNAL MEDICINE

## 2021-04-06 PROCEDURE — 85610 PROTHROMBIN TIME: CPT | Performed by: INTERNAL MEDICINE

## 2021-04-06 PROCEDURE — 25010000002 FENTANYL CITRATE (PF) 100 MCG/2ML SOLUTION: Performed by: INTERNAL MEDICINE

## 2021-04-06 PROCEDURE — 80053 COMPREHEN METABOLIC PANEL: CPT | Performed by: INTERNAL MEDICINE

## 2021-04-06 PROCEDURE — 85025 COMPLETE CBC W/AUTO DIFF WBC: CPT | Performed by: INTERNAL MEDICINE

## 2021-04-06 PROCEDURE — 75625 CONTRAST EXAM ABDOMINL AORTA: CPT | Performed by: INTERNAL MEDICINE

## 2021-04-06 PROCEDURE — S0260 H&P FOR SURGERY: HCPCS | Performed by: INTERNAL MEDICINE

## 2021-04-06 PROCEDURE — 25010000002 MIDAZOLAM PER 1 MG: Performed by: INTERNAL MEDICINE

## 2021-04-06 RX ORDER — FENTANYL CITRATE 50 UG/ML
INJECTION, SOLUTION INTRAMUSCULAR; INTRAVENOUS AS NEEDED
Status: DISCONTINUED | OUTPATIENT
Start: 2021-04-06 | End: 2021-04-06 | Stop reason: HOSPADM

## 2021-04-06 RX ORDER — SODIUM CHLORIDE 9 MG/ML
INJECTION, SOLUTION INTRAVENOUS CONTINUOUS PRN
Status: COMPLETED | OUTPATIENT
Start: 2021-04-06 | End: 2021-04-06

## 2021-04-06 RX ORDER — SODIUM CHLORIDE 9 MG/ML
100 INJECTION, SOLUTION INTRAVENOUS CONTINUOUS
Status: DISCONTINUED | OUTPATIENT
Start: 2021-04-06 | End: 2021-04-06 | Stop reason: HOSPADM

## 2021-04-06 RX ORDER — MIDAZOLAM HYDROCHLORIDE 1 MG/ML
INJECTION INTRAMUSCULAR; INTRAVENOUS AS NEEDED
Status: DISCONTINUED | OUTPATIENT
Start: 2021-04-06 | End: 2021-04-06 | Stop reason: HOSPADM

## 2021-04-06 RX ORDER — LIDOCAINE HYDROCHLORIDE 20 MG/ML
INJECTION, SOLUTION INFILTRATION; PERINEURAL AS NEEDED
Status: DISCONTINUED | OUTPATIENT
Start: 2021-04-06 | End: 2021-04-06 | Stop reason: HOSPADM

## 2021-04-06 NOTE — NURSING NOTE
Final vital signs gotten.    Patient up and walking in lab, site checked again with no bleeding or pain noted.

## 2021-04-06 NOTE — H&P
"Chief Complaint  Peripheral Vascular Disease and Abnormal SAMUEL        Subjective             Florinda Barr presents to [facility] CARDIOLOGY for consult abnormal SAMUEL.  Referred from Falguni Graff DPM  History of Present Illness   She presents with bilateral leg pain. She has more pain in the left than right.   SAMUEL.  She has a history of CVA. Denies any complaints since CVA  PAD:  with initial claudication. Presents with lower extremity pain and intermittent claudication with rest pain bilateral lower extremity. Only able to walk about 50 feet then has to take a break before continuing. Feet cold to touch,  SAMUEL 12/11/2021 showed:  Right SAMUEL 0.97  Right TBI 0.99.  Left SAMUEL 0.40  Left TBI 0.99.   Smoking:  Patient is a current every day smoker.  She smokes 0.5 - 1 ppd.  States that she denies to quit at this time.    The chart, PMH, FMH, social history, PSH, and medications were reviewed today. Problems above addressed this visit.           Objective         Vital Signs:   /77 (BP Location: Left arm)   Pulse 93   Ht 154.9 cm (61\")   Wt 43 kg (94 lb 12.8 oz)   SpO2 93%   BMI 17.91 kg/m²        Physical Exam  Constitutional:       Appearance: Normal appearance.   HENT:      Head: Normocephalic.      Nose: Nose normal.      Mouth/Throat:      Pharynx: Oropharynx is clear.   Eyes:      Pupils: Pupils are equal, round, and reactive to light.   Neck:      Musculoskeletal: Neck supple. No muscular tenderness.      Vascular: No carotid bruit.   Cardiovascular:      Rate and Rhythm: Normal rate and regular rhythm.      Pulses: Normal pulses.      Heart sounds: Normal heart sounds. No murmur.   Pulmonary:      Effort: Pulmonary effort is normal. No respiratory distress.      Breath sounds: Normal breath sounds.   Abdominal:      General: Bowel sounds are normal.      Palpations: Abdomen is soft.   Musculoskeletal:         General: No swelling.   Skin:     Findings: No bruising.   Neurological:      " General: No focal deficit present.      Mental Status: She is alert and oriented to person, place, and time.   Psychiatric:         Mood and Affect: Mood normal.         Behavior: Behavior normal.         Judgment: Judgment normal.                  Result Review    :  {Data reviewed: Cardiology studies 02/08/2021          Assessment  Abnormal SAMUEL  Atherosclerosis of native artery with claudication and rest pain.   Dyspnea on exertion  Dyslipidemia labs are pending fax  Chronic tobacco abuse        Plan  Proceed with an echocardiogram, CTA with runoff, and arterial US  Start Pletal 50 mg BID  Follow up in 3 month or sooner if necessary     I extensively discussed consequences of smoking namely cardiovascular/metabolic, lung cancer, mouth cancer, pulmonary disorder including COPD and reduced quality of life.  At the end of the conversation, patient voices understanding of the risk involved in smoking.  Patient also understands the benefits of quitting.  I provided the patient smoking cessation material. Patient displayed understanding and stated that she denies to quit smoking.  During this visit I spent 5 minutes counseling the patient regarding the smoking cessation.            Problem List Items Addressed This Visit      None               Visit Diagnoses      Abnormal ankle brachial index (SAMUEL)    -  Primary     Relevant Orders     Adult Transthoracic Echo Complete W/ Cont if Necessary Per Protocol     CT Angio Abdominal Aorta Bilateral Iliofem Runoff     US Arterial Doppler Lower Extremity Bilateral     PVD (peripheral vascular disease) (CMS/HCC)         Relevant Orders     Adult Transthoracic Echo Complete W/ Cont if Necessary Per Protocol     CT Angio Abdominal Aorta Bilateral Iliofem Runoff     US Arterial Doppler Lower Extremity Bilateral     Dyslipidemia         Relevant Orders     Adult Transthoracic Echo Complete W/ Cont if Necessary Per Protocol     CT Angio Abdominal Aorta Bilateral Iliofem Runoff     US  Arterial Doppler Lower Extremity Bilateral     Dyspnea on exertion         Relevant Orders     Adult Transthoracic Echo Complete W/ Cont if Necessary Per Protocol     CT Angio Abdominal Aorta Bilateral Iliofem Runoff     US Arterial Doppler Lower Extremity Bilateral     Atherosclerosis of native artery of left lower extremity with intermittent claudication (CMS/HCC)         Relevant Orders     CT Angio Abdominal Aorta Bilateral Iliofem Runoff     US Arterial Doppler Lower Extremity Bilateral         Pt CTA showed L iliac stenosis - high grade and aslo B/L SFA stenosis, with 2 v run off B/L  Her claudication is more severe on L side.

## 2021-05-05 ENCOUNTER — OFFICE VISIT (OUTPATIENT)
Dept: CARDIAC SURGERY | Facility: CLINIC | Age: 66
End: 2021-05-05

## 2021-05-05 ENCOUNTER — TELEPHONE (OUTPATIENT)
Dept: CARDIAC SURGERY | Facility: CLINIC | Age: 66
End: 2021-05-05

## 2021-05-05 VITALS
SYSTOLIC BLOOD PRESSURE: 130 MMHG | TEMPERATURE: 98 F | DIASTOLIC BLOOD PRESSURE: 60 MMHG | WEIGHT: 89 LBS | HEIGHT: 61 IN | OXYGEN SATURATION: 95 % | HEART RATE: 87 BPM | BODY MASS INDEX: 16.8 KG/M2

## 2021-05-05 DIAGNOSIS — I73.9 PVD (PERIPHERAL VASCULAR DISEASE) (HCC): Primary | ICD-10-CM

## 2021-05-05 PROCEDURE — 99202 OFFICE O/P NEW SF 15 MIN: CPT | Performed by: THORACIC SURGERY (CARDIOTHORACIC VASCULAR SURGERY)

## 2021-05-05 RX ORDER — CILOSTAZOL 50 MG/1
100 TABLET ORAL 2 TIMES DAILY
Qty: 60 TABLET | Refills: 11 | Status: SHIPPED | OUTPATIENT
Start: 2021-05-05 | End: 2021-07-07

## 2021-05-05 NOTE — TELEPHONE ENCOUNTER
Patient pharmacy called asking for clarification on prescription sent in today for cilostazol. The prescription instructions said to take 2 50mg tabs PO BID but the quantity filled was only 60. I let her know that the instructions were correct and to just up the quantity to 120 so that the patient would have enough for a full month. She verbalized understanding and agreed.

## 2021-05-05 NOTE — PROGRESS NOTES
05/05/2021  Patient Information  Florinda Barr                                                                                          [address]  [place] [address]   1955  'PCP/Referring Physician'  Tata Varghese, APRN  [phone]  No ref. provider found    Chief Complaint   Patient presents with   • Consult     NP per Dr. Cain for PVD-Complains of Bilateral Lower Extremity Burning Sensation       History of Present Illness: 66-year-old  female with a history of hyperlipidemia, COPD, active tobacco abuse and stroke who presents with bilateral (L>R) lower extremity pain.  For the past 8 to 9 months, the patient notes worsening bilateral lower extremity pain that is present in the calves and feet.  This is exacerbated ambulating short distances and alleviated with rest.  She denies any lower extremity wounds or erythema.      Patient Active Problem List   Diagnosis   • Pneumonia of both lungs due to infectious organism   • Abnormal ankle brachial index (SAMUEL)   • Atherosclerosis of native artery of left lower extremity with intermittent claudication (CMS/HCC)   • PVD (peripheral vascular disease) (CMS/HCC)     Past Medical History:   Diagnosis Date   • COPD (chronic obstructive pulmonary disease) (CMS/HCC)    • Hyperlipidemia    • Peripheral vascular disease (CMS/HCC)    • Stroke (CMS/HCC)      Past Surgical History:   Procedure Laterality Date   • CARDIAC CATHETERIZATION Left 4/6/2021    Procedure: Peripheral angiography;  Surgeon: Dae Callahan MD;  Location: [facility] INVASIVE LOCATION;  Service: Cardiovascular;  Laterality: Left;       Current Outpatient Medications:   •  coenzyme Q10 100 MG capsule, Take 100 mg by mouth Every Other Day., Disp: , Rfl:   •  dipyridamole-aspirin (AGGRENOX)  MG per 12 hr capsule, Take 1 capsule by mouth 2 (two) times a day., Disp: , Rfl:   •  folic acid (FOLVITE) 400 MCG tablet, Take 400 mcg by mouth Every Other Day., Disp: , Rfl:   •   ipratropium-albuterol (DUO-NEB) 0.5-2.5 mg/3 ml nebulizer, Take 3 mL by nebulization Every 4 (Four) Hours As Needed for Shortness of Air., Disp: 360 mL, Rfl: 0  •  rosuvastatin (CRESTOR) 40 MG tablet, Take 40 mg by mouth Every Other Day., Disp: , Rfl:   •  vitamin D (ERGOCALCIFEROL) 1.25 MG (84181 UT) capsule capsule, Take 50,000 Units by mouth 1 (One) Time Per Week., Disp: , Rfl:   •  budesonide-formoterol (SYMBICORT) 80-4.5 MCG/ACT inhaler, Inhale 2 puffs 2 (Two) Times a Day., Disp: 10.2 g, Rfl: 0  •  cilostazol (PLETAL) 50 MG tablet, Take 1 tablet by mouth 2 (Two) Times a Day Before Meals., Disp: 60 tablet, Rfl: 6  •  doxycycline (MONODOX) 100 MG capsule, Take 1 capsule by mouth every 12 hours for 7 days as part of COPD Rescue Kit. (Only Start if in YELLOW ZONE.), Disp: 14 capsule, Rfl: 0  •  ipratropium-albuterol (DUO-NEB) 0.5-2.5 mg/3 ml nebulizer, Take 3 mL by neb every 30 minutes as needed for shortness of air for up to 6 doses. Part of COPD Rescue Kit. (Only Start if in YELLOW ZONE.), Disp: 18 mL, Rfl: 0  •  nicotine (NICODERM CQ) 21 MG/24HR patch, Place 1 patch on the skin as directed by provider Daily., Disp: 30 patch, Rfl: 0  •  predniSONE (DELTASONE) 20 MG tablet, Take 2 tablets daily for 5 days as part of COPD Rescue Kit. (Only Start if in YELLOW ZONE.), Disp: 10 tablet, Rfl: 0  Allergies   Allergen Reactions   • Codeine Swelling   • Latex Hives     Social History     Socioeconomic History   • Marital status:      Spouse name: Not on file   • Number of children: 3   • Years of education: Not on file   • Highest education level: Not on file   Tobacco Use   • Smoking status: Current Every Day Smoker     Packs/day: 0.25     Years: 50.00     Pack years: 12.50     Types: Cigarettes   • Smokeless tobacco: Never Used   Substance and Sexual Activity   • Alcohol use: No   • Drug use: No   • Sexual activity: Defer     Family History   Problem Relation Age of Onset   • Heart disease Mother    •  "Hypertension Mother    • Diabetes Mother      Review of Systems   Constitutional: Positive for malaise/fatigue and weight loss (5-6 lbs in 1 month). Negative for chills, fever and night sweats.   HENT: Negative for hearing loss, odynophagia and sore throat.    Cardiovascular: Positive for claudication and dyspnea on exertion. Negative for chest pain, leg swelling, orthopnea and palpitations.   Respiratory: Positive for cough, shortness of breath and wheezing. Negative for hemoptysis.    Endocrine: Negative for cold intolerance, heat intolerance, polydipsia, polyphagia and polyuria.   Hematologic/Lymphatic: Bruises/bleeds easily.   Skin: Negative for itching and rash.   Musculoskeletal: Negative for joint pain, joint swelling and myalgias.   Gastrointestinal: Negative for abdominal pain, constipation, diarrhea, hematemesis, hematochezia, melena, nausea and vomiting.   Genitourinary: Negative for dysuria, frequency and hematuria.   Neurological: Positive for dizziness and light-headedness. Negative for focal weakness, headaches, numbness and seizures.   Psychiatric/Behavioral: Negative for suicidal ideas.   All other systems reviewed and are negative.    Vitals:    05/05/21 1248   BP: 130/60   BP Location: Left arm   Patient Position: Sitting   Pulse: 87   Temp: 98 °F (36.7 °C)   SpO2: 95%   Weight: 40.4 kg (89 lb)   Height: 154.9 cm (61\")      Physical Exam  Vitals reviewed.   Constitutional:       General: She is not in acute distress.     Comments: Thin  female who appears stated age   HENT:      Head: Normocephalic and atraumatic.      Nose: Nose normal.   Eyes:      General: No scleral icterus.  Cardiovascular:      Rate and Rhythm: Normal rate and regular rhythm.      Pulses:           Femoral pulses are 0 on the right side and 0 on the left side.       Popliteal pulses are 0 on the right side and 0 on the left side.        Dorsalis pedis pulses are detected w/ Doppler on the right side and detected w/ " Doppler on the left side.        Posterior tibial pulses are 0 on the right side and detected w/ Doppler on the left side.      Heart sounds: No murmur heard.   No friction rub. No gallop.    Pulmonary:      Effort: Pulmonary effort is normal. No respiratory distress.      Breath sounds: Normal breath sounds.   Abdominal:      General: There is no distension.      Palpations: Abdomen is soft. There is no mass.      Tenderness: There is no abdominal tenderness. There is no guarding or rebound.   Musculoskeletal:         General: Normal range of motion.      Cervical back: Neck supple.      Right lower leg: No edema.      Left lower leg: No edema.   Skin:     General: Skin is warm and dry.      Comments: No pedal erythema or ulcerations   Neurological:      General: No focal deficit present.      Mental Status: She is alert and oriented to person, place, and time.      Comments: Intact pedal motor function and sensation   Psychiatric:         Mood and Affect: Mood normal.         Behavior: Behavior normal.         Thought Content: Thought content normal.         Judgment: Judgment normal.         The ROS, past medical history, surgical history, family history, social history and vitals were reviewed by myself and corrected as needed.      Labs/Imaging:  -CTA of the aorta with lower extremity runoff performed 3/11/2021, personally reviewed, demonstrates high-grade left common iliac artery stenosis.  There is significant stenosis of the right common iliac.  The bilateral superficial femoral arteries have occlusions with reconstitution distally.  Two-vessel runoff is present.  -Arterial duplex performed 2/24/2021, per report, demonstrates significant atherosclerosis of the bilateral lower extremities with monophasic flow suggestive of proximal iliac disease.  A long segment right SFA occlusion is present with popliteal reconstitution.  The left superficial femoral artery has diffuse plaques with no focal occlusion  seen.  -Angiogram performed 4/6/2021, personally reviewed, demonstrates 99% right common iliac stenosis, diffuse right external iliac and common femoral stenosis, occlusion of the right SFA with reconstitution at the adductor canal.  The left common iliac artery has a 95% stenosis proximally.  The left external iliac is occluded in 2 locations.  The left common femoral artery is occluded with reconstitution at the bifurcation.  There is 80% SFA stenosis on the left.  Two-vessel runoff is present.    Assessment/Plan:  66-year-old  female with a history of hyperlipidemia, COPD, active tobacco abuse and stroke who presents with bilateral (L>R) lower extremity pain.  She has bilateral lower extremity claudication in the setting of extensive peripheral vascular disease.  I encouraged the patient to work on smoking cessation and a structured walking program.  Her Pletal dosage will be increased to 100 mg p.o. twice daily.  We discussed options including continued medical management versus surgical revascularization.  The patient has extensive peripheral vascular disease and would require possible bilateral iliac stenting versus aortobifemoral bypass and bilateral femoral-popliteal bypasses.  At this time, the patient wished to continue with medical management.  I agree with this decision.  She will work on smoking cessation.  The patient has a follow-up appointment with Dr. Callahan today.  I will have her return to clinic in 1 month to evaluate her progress.  She knows to call the office with worsening lower extremity pain, wounds or erythema.    Patient Active Problem List   Diagnosis   • Pneumonia of both lungs due to infectious organism   • Abnormal ankle brachial index (SAMUEL)   • Atherosclerosis of native artery of left lower extremity with intermittent claudication (CMS/HCC)   • PVD (peripheral vascular disease) (CMS/HCC)

## 2021-07-07 ENCOUNTER — OFFICE VISIT (OUTPATIENT)
Dept: CARDIAC SURGERY | Facility: CLINIC | Age: 66
End: 2021-07-07

## 2021-07-07 VITALS
WEIGHT: 83 LBS | HEIGHT: 61 IN | SYSTOLIC BLOOD PRESSURE: 110 MMHG | DIASTOLIC BLOOD PRESSURE: 60 MMHG | BODY MASS INDEX: 15.67 KG/M2 | TEMPERATURE: 97.8 F | OXYGEN SATURATION: 97 % | HEART RATE: 89 BPM

## 2021-07-07 DIAGNOSIS — I73.9 PVD (PERIPHERAL VASCULAR DISEASE) (HCC): Primary | ICD-10-CM

## 2021-07-07 PROCEDURE — 99213 OFFICE O/P EST LOW 20 MIN: CPT | Performed by: NURSE PRACTITIONER

## 2021-07-07 RX ORDER — PENTOXIFYLLINE 400 MG/1
400 TABLET, EXTENDED RELEASE ORAL
Qty: 90 TABLET | Refills: 3 | Status: SHIPPED | OUTPATIENT
Start: 2021-07-07 | End: 2021-08-18 | Stop reason: SDUPTHER

## 2021-07-07 NOTE — PROGRESS NOTES
[facility] Office Follow Up Note     Date of Encounter: 07/07/2021     MRN Number: [id number]  Name: Florinda Barr  Phone Number: [phone]     Referred By: No ref. provider found  PCP: Tata Varghese APRN    Chief Complaint:    Chief Complaint   Patient presents with   • Follow-up     1 MO FU for PVD       History of Present Illness:    Florinda Barr is a 66 y.o. female with a history of HLP, COPD, active tobacco use, CVA and PVD.  Pt presents today in 1 month follow up PVD.  Last seen in the office on 5/5/21 with Dr. Torres with extensive PVD and bilateral claudications symptoms.  Dr. Torres discussed extensive surgical revascularization options and plans for conservative management with Pletal, structured walking program and smoking cessation.  Since last office visit, pt has had some minor improvement in her BLE claudication symptoms.  She is still smoking.  She denies any lower extremity ulcerations.  She was not able to tolerate Pletal therapy due to hives.      Review of Systems:  Review of Systems   Constitutional: Positive for malaise/fatigue and weight loss. Negative for chills, decreased appetite, diaphoresis, fever and night sweats.   HENT: Negative for congestion, hoarse voice, sore throat and stridor.    Cardiovascular: Positive for claudication and dyspnea on exertion. Negative for chest pain, irregular heartbeat, leg swelling, near-syncope, orthopnea, palpitations, paroxysmal nocturnal dyspnea and syncope.   Respiratory: Positive for cough, shortness of breath and wheezing. Negative for hemoptysis, sleep disturbances due to breathing, snoring and sputum production.    Hematologic/Lymphatic: Negative for adenopathy and bleeding problem. Bruises/bleeds easily.   Skin: Negative for color change, dry skin, itching, poor wound healing and rash.   Musculoskeletal: Negative for arthritis, back pain, falls and muscle weakness.   Gastrointestinal: Positive for  abdominal pain. Negative for anorexia, diarrhea, hematochezia, melena, nausea and vomiting.   Neurological: Positive for dizziness and light-headedness. Negative for difficulty with concentration, disturbances in coordination, loss of balance, numbness, seizures, vertigo and weakness.   Psychiatric/Behavioral: Negative for altered mental status, depression, memory loss and substance abuse. The patient does not have insomnia and is not nervous/anxious.    Allergic/Immunologic: Negative for persistent infections.       Allergies:  Allergies   Allergen Reactions   • Codeine Swelling   • Latex Hives   • Pletal [Cilostazol] Rash     Welps       Medications:      Current Outpatient Medications:   •  budesonide-formoterol (SYMBICORT) 80-4.5 MCG/ACT inhaler, Inhale 2 puffs 2 (Two) Times a Day., Disp: 10.2 g, Rfl: 0  •  coenzyme Q10 100 MG capsule, Take 100 mg by mouth Every Other Day., Disp: , Rfl:   •  dipyridamole-aspirin (AGGRENOX)  MG per 12 hr capsule, Take 1 capsule by mouth 2 (two) times a day., Disp: , Rfl:   •  folic acid (FOLVITE) 400 MCG tablet, Take 400 mcg by mouth Every Other Day., Disp: , Rfl:   •  ipratropium-albuterol (DUO-NEB) 0.5-2.5 mg/3 ml nebulizer, Take 3 mL by nebulization Every 4 (Four) Hours As Needed for Shortness of Air., Disp: 360 mL, Rfl: 0  •  rosuvastatin (CRESTOR) 40 MG tablet, Take 40 mg by mouth Every Other Day., Disp: , Rfl:   •  cilostazol (PLETAL) 50 MG tablet, Take 2 tablets by mouth 2 (Two) Times a Day., Disp: 60 tablet, Rfl: 11  •  doxycycline (MONODOX) 100 MG capsule, Take 1 capsule by mouth every 12 hours for 7 days as part of COPD Rescue Kit. (Only Start if in YELLOW ZONE.), Disp: 14 capsule, Rfl: 0  •  ipratropium-albuterol (DUO-NEB) 0.5-2.5 mg/3 ml nebulizer, Take 3 mL by neb every 30 minutes as needed for shortness of air for up to 6 doses. Part of COPD Rescue Kit. (Only Start if in YELLOW ZONE.), Disp: 18 mL, Rfl: 0  •  nicotine (NICODERM CQ) 21 MG/24HR patch, Place 1  "patch on the skin as directed by provider Daily., Disp: 30 patch, Rfl: 0  •  predniSONE (DELTASONE) 20 MG tablet, Take 2 tablets daily for 5 days as part of COPD Rescue Kit. (Only Start if in YELLOW ZONE.), Disp: 10 tablet, Rfl: 0  •  vitamin D (ERGOCALCIFEROL) 1.25 MG (16272 UT) capsule capsule, Take 50,000 Units by mouth 1 (One) Time Per Week., Disp: , Rfl:     Social History     Socioeconomic History   • Marital status:      Spouse name: Not on file   • Number of children: 3   • Years of education: Not on file   • Highest education level: Not on file   Tobacco Use   • Smoking status: Current Every Day Smoker     Packs/day: 0.25     Years: 50.00     Pack years: 12.50     Types: Cigarettes   • Smokeless tobacco: Never Used   Substance and Sexual Activity   • Alcohol use: No   • Drug use: No   • Sexual activity: Defer       Family History   Problem Relation Age of Onset   • Heart disease Mother    • Hypertension Mother    • Diabetes Mother        Past Medical History:   Diagnosis Date   • COPD (chronic obstructive pulmonary disease) (CMS/[facility])    • Hyperlipidemia    • Peripheral vascular disease (CMS/[facility])    • Stroke (CMS/[facility])        Past Surgical History:   Procedure Laterality Date   • CARDIAC CATHETERIZATION Left 4/6/2021    Procedure: Peripheral angiography;  Surgeon: Dae Callahan MD;  Location: [facility] INVASIVE LOC[facility];  Service: Cardiovascular;  Laterality: Left;       I have reviewed the following portions of the patient's history: allergies, current medications, past family history, past medical history, past social history, past surgical history and problem list and confirm it's accurate.    Physical Exam:  Vital Signs:    Vitals:    07/07/21 1237   BP: 110/60   BP Location: Left arm   Patient Position: Sitting   Pulse: 89   Temp: 97.8 °F (36.6 °C)   SpO2: 97%   Weight: 37.6 kg (83 lb)   Height: 154.9 cm (61\")     Body mass index is 15.68 kg/m².     Physical Exam  Vitals and " nursing note reviewed.   Constitutional:       Appearance: Normal appearance. She is well-developed and well-groomed.   HENT:      Head: Normocephalic and atraumatic.   Cardiovascular:      Rate and Rhythm: Normal rate.      Pulses:           Dorsalis pedis pulses are 0 on the right side and 0 on the left side.        Posterior tibial pulses are 0 on the right side and 0 on the left side.   Pulmonary:      Comments: Unlabored, Coarse  Musculoskeletal:      Cervical back: Neck supple.      Right lower leg: No edema.      Left lower leg: No edema.   Skin:     General: Skin is warm and dry.   Neurological:      Mental Status: She is alert and oriented to person, place, and time.   Psychiatric:         Attention and Perception: Attention normal.         Mood and Affect: Mood normal.         Speech: Speech normal.         Behavior: Behavior is cooperative.         Labs/Imaging:    No radiology results for the last 30 days.         Time Spent: I spent 20 minutes caring for Florinda on this date of service. This time includes time spent by me in the following activities: preparing for the visit, reviewing tests, obtaining and/or reviewing a separately obtained history, performing a medically appropriate examination and/or evaluation, counseling and educating the patient/family/caregiver, ordering medications, tests, or procedures and documenting information in the medical record.     Assessment / Plan:  Diagnoses and all orders for this visit:    1. PVD (peripheral vascular disease) (CMS/[facility]) (Primary)     Florinda Barr is a 66 y.o. female with a history of HLP, COPD, active tobacco use, CVA and PVD.  Pt with extensive PVD and Dr. Torres discussed with pt at last office visit that surgical revascularization options would be substantial.  At this time, pt would like to continue conservative management.  She plans to reach out to her PCP for medication options for smoking cessation.  She denies any BLE ulcerations.  She was  unable to tolerate Pletal therapy due to hives and we will trial pentoxifylline 400 mg po TID.  Plans to follow up in 1 month to reevaluate PVD.         CARMELO Bustos  [facility]

## 2021-08-18 ENCOUNTER — OFFICE VISIT (OUTPATIENT)
Dept: CARDIAC SURGERY | Facility: CLINIC | Age: 66
End: 2021-08-18

## 2021-08-18 VITALS
SYSTOLIC BLOOD PRESSURE: 120 MMHG | TEMPERATURE: 97.3 F | HEART RATE: 70 BPM | BODY MASS INDEX: 15.18 KG/M2 | HEIGHT: 61 IN | OXYGEN SATURATION: 97 % | WEIGHT: 80.4 LBS | DIASTOLIC BLOOD PRESSURE: 60 MMHG

## 2021-08-18 DIAGNOSIS — I73.9 PVD (PERIPHERAL VASCULAR DISEASE) (HCC): Primary | ICD-10-CM

## 2021-08-18 PROCEDURE — 99213 OFFICE O/P EST LOW 20 MIN: CPT | Performed by: NURSE PRACTITIONER

## 2021-08-18 RX ORDER — BUPROPION HYDROCHLORIDE 150 MG/1
150 TABLET, EXTENDED RELEASE ORAL 2 TIMES DAILY
Qty: 180 TABLET | Refills: 1 | Status: ON HOLD | OUTPATIENT
Start: 2021-08-18 | End: 2021-11-21

## 2021-08-18 RX ORDER — PENTOXIFYLLINE 400 MG/1
400 TABLET, EXTENDED RELEASE ORAL
Qty: 270 TABLET | Refills: 1 | Status: ON HOLD | OUTPATIENT
Start: 2021-08-18 | End: 2021-11-21

## 2021-08-18 NOTE — PROGRESS NOTES
[facility] Cardiothoracic Surgery Office Follow Up Note     Date of Encounter: 2021     Name: Florinda Barr  : 1955     Referred By: No ref. provider found  PCP: Tata Varghese APRN    Chief Complaint:    Chief Complaint   Patient presents with   • Follow-up     1 mo f/u with - no test noted for PVD.        Subjective      History of Present Illness:    Florinda Barr is a 66 y.o. female current smoker with history of COPD, HTN, HLD on statin therapy and symptomatic PVD.  Last seen in clinic 2021 by Melly RHODES after opting for conservative management with Pletal and structured walking regiment in favor of extensive surgical revascularization with Dr. Torres.  Patient was unable to tolerate Pletal secondary to hives and pentoxifylline was trialed. Unfortunately, patient states she never picked the medication up at the pharmacy. She is walking as much as possible but has not stopped smoking. She is interested in smoking cessation and her PCP called her in Chantix but it has been recalled. Her PCP called in Wellbutrin for patient but she states the pharmacy still doesn't have it. She continues to have crampy/burning pain to her calf with walking short distances, unable to provide exact amount but has no hx of BLE wounds/ulcers or delayed wound healing.     Review of Systems:  Review of Systems   Constitutional: Negative for chills, decreased appetite, diaphoresis, fever, malaise/fatigue, night sweats, weight gain and weight loss.   HENT: Negative for hoarse voice.    Eyes: Negative for blurred vision, double vision and visual disturbance.   Cardiovascular: Positive for claudication ( leg pain ). Negative for chest pain, dyspnea on exertion, irregular heartbeat, leg swelling, near-syncope, orthopnea, palpitations, paroxysmal nocturnal dyspnea and syncope.   Respiratory: Negative for cough, hemoptysis, shortness of breath, sputum production and wheezing.    Hematologic/Lymphatic: Negative  for adenopathy and bleeding problem. Does not bruise/bleed easily.   Skin: Negative for color change, nail changes, poor wound healing and rash.   Musculoskeletal: Negative for back pain, falls and muscle cramps.   Gastrointestinal: Negative for abdominal pain, dysphagia and heartburn.   Genitourinary: Negative for flank pain.   Neurological: Negative for brief paralysis, disturbances in coordination, dizziness, focal weakness, headaches, light-headedness, loss of balance, numbness, paresthesias, sensory change, vertigo and weakness.   Psychiatric/Behavioral: Negative for depression and suicidal ideas.   Allergic/Immunologic: Negative for persistent infections.       I have reviewed the following portions of the patient's history: allergies, current medications, past family history, past medical history, past social history, past surgical history and problem list and confirm it's accurate.    Allergies:  Allergies   Allergen Reactions   • Codeine Swelling   • Latex Hives   • Pletal [Cilostazol] Rash     Welps       Medications:      Current Outpatient Medications:   •  budesonide-formoterol (SYMBICORT) 80-4.5 MCG/ACT inhaler, Inhale 2 puffs 2 (Two) Times a Day., Disp: 10.2 g, Rfl: 0  •  coenzyme Q10 100 MG capsule, Take 100 mg by mouth Every Other Day., Disp: , Rfl:   •  dipyridamole-aspirin (AGGRENOX)  MG per 12 hr capsule, Take 1 capsule by mouth 2 (two) times a day., Disp: , Rfl:   •  doxycycline (MONODOX) 100 MG capsule, Take 1 capsule by mouth every 12 hours for 7 days as part of COPD Rescue Kit. (Only Start if in YELLOW ZONE.), Disp: 14 capsule, Rfl: 0  •  folic acid (FOLVITE) 400 MCG tablet, Take 400 mcg by mouth Every Other Day., Disp: , Rfl:   •  ipratropium-albuterol (DUO-NEB) 0.5-2.5 mg/3 ml nebulizer, Take 3 mL by nebulization Every 4 (Four) Hours As Needed for Shortness of Air., Disp: 360 mL, Rfl: 0  •  ipratropium-albuterol (DUO-NEB) 0.5-2.5 mg/3 ml nebulizer, Take 3 mL by neb every 30 minutes as  needed for shortness of air for up to 6 doses. Part of COPD Rescue Kit. (Only Start if in YELLOW ZONE.), Disp: 18 mL, Rfl: 0  •  nicotine (NICODERM CQ) 21 MG/24HR patch, Place 1 patch on the skin as directed by provider Daily., Disp: 30 patch, Rfl: 0  •  pentoxifylline (TRENtal) 400 MG CR tablet, Take 1 tablet by mouth 3 (Three) Times a Day With Meals., Disp: 270 tablet, Rfl: 1  •  predniSONE (DELTASONE) 20 MG tablet, Take 2 tablets daily for 5 days as part of COPD Rescue Kit. (Only Start if in YELLOW ZONE.), Disp: 10 tablet, Rfl: 0  •  rosuvastatin (CRESTOR) 40 MG tablet, Take 40 mg by mouth Every Other Day., Disp: , Rfl:   •  vitamin D (ERGOCALCIFEROL) 1.25 MG (88619 UT) capsule capsule, Take 50,000 Units by mouth 1 (One) Time Per Week., Disp: , Rfl:   •  buPROPion SR (Wellbutrin SR) 150 MG 12 hr tablet, Take 1 tablet by mouth 2 (Two) Times a Day., Disp: 180 tablet, Rfl: 1    History:   Past Medical History:   Diagnosis Date   • COPD (chronic obstructive pulmonary disease) (CMS/HCC)    • Hyperlipidemia    • Peripheral vascular disease (CMS/HCC)    • Stroke (CMS/HCC)        Past Surgical History:   Procedure Laterality Date   • CARDIAC CATHETERIZATION Left 4/6/2021    Procedure: Peripheral angiography;  Surgeon: Dae Callahan MD;  Location: [facility] INVASIVE LOCATION;  Service: Cardiovascular;  Laterality: Left;       Social History     Socioeconomic History   • Marital status:      Spouse name: Not on file   • Number of children: 3   • Years of education: Not on file   • Highest education level: Not on file   Tobacco Use   • Smoking status: Current Every Day Smoker     Packs/day: 0.25     Years: 50.00     Pack years: 12.50     Types: Cigarettes   • Smokeless tobacco: Never Used   Substance and Sexual Activity   • Alcohol use: No   • Drug use: No   • Sexual activity: Defer        Family History   Problem Relation Age of Onset   • Heart disease Mother    • Hypertension Mother    • Diabetes  "Mother        Objective     Physical Exam:  Vitals:    08/18/21 1345   BP: 120/60   BP Location: Left arm   Pulse: 70   Temp: 97.3 °F (36.3 °C)   SpO2: 97%   Weight: 36.5 kg (80 lb 6.4 oz)   Height: 154.9 cm (61\")      Body mass index is 15.19 kg/m².    Physical Exam  Vitals and nursing note reviewed.   Constitutional:       Appearance: Normal appearance. She is well-developed and underweight.   HENT:      Head: Normocephalic and atraumatic.   Eyes:      Pupils: Pupils are equal, round, and reactive to light.   Neck:      Vascular: No carotid bruit.   Cardiovascular:      Rate and Rhythm: Normal rate and regular rhythm.      Pulses:           Dorsalis pedis pulses are detected w/ Doppler on the right side and detected w/ Doppler on the left side.        Posterior tibial pulses are detected w/ Doppler on the right side and detected w/ Doppler on the left side.      Heart sounds: Normal heart sounds, S1 normal and S2 normal. No murmur heard.        Comments: Easily dopplerable pulses with warm lower extremities  Toes: pink, warm, dry  Pulmonary:      Effort: Pulmonary effort is normal.      Breath sounds: Normal breath sounds.   Abdominal:      Palpations: Abdomen is soft.   Musculoskeletal:         General: No swelling.      Cervical back: Neck supple.      Right lower leg: No edema.      Left lower leg: No edema.   Skin:     General: Skin is warm and dry.      Capillary Refill: Capillary refill takes less than 2 seconds.      Findings: No bruising.   Neurological:      General: No focal deficit present.      Mental Status: She is alert and oriented to person, place, and time. Mental status is at baseline.      GCS: GCS eye subscore is 4. GCS verbal subscore is 5. GCS motor subscore is 6.      Motor: Motor function is intact.      Coordination: Coordination is intact.      Gait: Gait is intact.   Psychiatric:         Mood and Affect: Mood normal.         Speech: Speech normal.         Behavior: Behavior normal. " Behavior is cooperative.         Cognition and Memory: Cognition normal.         Imaging/Labs:  CTA abdominal aorta bilateral iliofemoral runoff 3/11/2021:  Hemodynamically significant atherosclerotic disease is noted. There are significant stenoses in the external iliac arteries bilaterally. There is a large amount of plaque with multiple stenoses in  the common iliac arteries. There is a long segment of occlusion of the superficial femoral artery in the right leg. There are smaller segments of occlusion of the left superficial femoral artery. There are collaterals from the profunda femoral artery. Popliteal and runoff arteries show only mild plaquing.       Assessment / Plan      Assessment / Plan:  Diagnoses and all orders for this visit:    1. PVD (peripheral vascular disease) (CMS/[facility]) (Primary)  -     pentoxifylline (TRENtal) 400 MG CR tablet; Take 1 tablet by mouth 3 (Three) Times a Day With Meals.  Dispense: 270 tablet; Refill: 1    Other orders  -     buPROPion SR (Wellbutrin SR) 150 MG 12 hr tablet; Take 1 tablet by mouth 2 (Two) Times a Day.  Dispense: 180 tablet; Refill: 1       Florinda Barr is a 66 y.o. female current smoker with history of COPD, HTN, HLD on statin therapy and symptomatic PVD.  Last seen in clinic 7/2021 by Melly RHODES after opting for conservative management with Pletal and structured walking regiment in favor of extensive surgical revascularization with Dr. Torres.  Patient was unable to tolerate Pletal secondary to hives and pentoxifylline was trialed. Unfortunately, patient states she never picked the medication up at the pharmacy. She is walking as much as possible but has not stopped smoking. She is interested in smoking cessation and her PCP called her in Chantix but it has been recalled. Her PCP called in Wellbutrin for patient but she states the pharmacy still doesn't have it. She continues to have crampy/burning pain to her calf with walking short distances, unable to  provide exact amount but has no hx of BLE wounds/ulcers or delayed wound healing. On chart review medication has been sent in but I will attempt e.rx again. I will also provide a rx for Wellbutrin for smoking cessation and pt reports having difficulty obtaining even after calling PCP office several times. She was instructed to contact the pharmacy later today and if they have not received notify our office as it may be a pharmacy problem. Pt verbalized understanding, is agreeable to plan, and has no further questions or concerns today. I will plan to see her back in 3 months or sooner as needed.    Patient Education:  A structured walking regiment is used to improve the circulation or blood flow in your legs. Recognize that walking may hurt at first - and that is good. In fact, the goal is to walk at a pace that causes mild or moderate pain or tightness in your legs. Pace yourself, stop to rest for a few minutes, but then resume walking. This discomfort triggers your body to improve your circulation. Repeat several times: Walk at a pace that causes mild or moderate leg pain, then rest, and keep going. Over time, you may find you are able to walk longer with less pain. That is a sign that your blood vessels are recovering. It may take months, so be patient and persistent.     Follow Up:   Return in about 3 months (around 11/18/2021).   Or sooner for any further concerns or worsening sign and symptoms. If unable to reach us in the office please dial 911 or go to the nearest emergency department.      Sloane Pitts APRDOMINGA  [facility] Cardiothoracic Surgery    Time Spent: I spent 20+ minutes caring for Florinda on this date of service. This time includes time spent by me in the following activities: preparing for the visit, reviewing tests, obtaining and/or reviewing a separately obtained history, performing a medically appropriate examination and/or evaluation, counseling and educating the patient/family/caregiver,  ordering medications, tests, or procedures, documenting information in the medical record, independently interpreting results and communicating that information with the patient/family/caregiver and care coordination.

## 2021-09-13 ENCOUNTER — TRANSCRIBE ORDERS (OUTPATIENT)
Dept: ADMINISTRATIVE | Facility: HOSPITAL | Age: 66
End: 2021-09-13

## 2021-09-13 ENCOUNTER — HOSPITAL ENCOUNTER (OUTPATIENT)
Dept: GENERAL RADIOLOGY | Facility: HOSPITAL | Age: 66
Discharge: HOME | End: 2021-09-13
Admitting: NURSE PRACTITIONER

## 2021-09-13 DIAGNOSIS — J44.9 CHRONIC OBSTRUCTIVE PULMONARY DISEASE, UNSPECIFIED COPD TYPE (HCC): Primary | ICD-10-CM

## 2021-09-13 DIAGNOSIS — J44.9 CHRONIC OBSTRUCTIVE PULMONARY DISEASE, UNSPECIFIED COPD TYPE (HCC): ICD-10-CM

## 2021-09-13 PROCEDURE — 71046 X-RAY EXAM CHEST 2 VIEWS: CPT | Performed by: RADIOLOGY

## 2021-09-13 PROCEDURE — 71046 X-RAY EXAM CHEST 2 VIEWS: CPT

## 2021-11-03 ENCOUNTER — TRANSCRIBE ORDERS (OUTPATIENT)
Dept: ADMINISTRATIVE | Facility: HOSPITAL | Age: 66
End: 2021-11-03

## 2021-11-03 DIAGNOSIS — F17.210 CIGARETTE SMOKER: Primary | ICD-10-CM

## 2021-11-21 ENCOUNTER — APPOINTMENT (OUTPATIENT)
Dept: GENERAL RADIOLOGY | Facility: HOSPITAL | Age: 66
End: 2021-11-21

## 2021-11-21 ENCOUNTER — HOSPITAL ENCOUNTER (INPATIENT)
Facility: HOSPITAL | Age: 66
LOS: 4 days | Discharge: HOME | End: 2021-11-25
Attending: EMERGENCY MEDICINE | Admitting: STUDENT IN AN ORGANIZED HEALTH CARE EDUCATION/TRAINING PROGRAM

## 2021-11-21 ENCOUNTER — APPOINTMENT (OUTPATIENT)
Dept: CT IMAGING | Facility: HOSPITAL | Age: 66
End: 2021-11-21

## 2021-11-21 DIAGNOSIS — J18.9 PNEUMONIA OF RIGHT LOWER LOBE DUE TO INFECTIOUS ORGANISM: Primary | ICD-10-CM

## 2021-11-21 DIAGNOSIS — N39.0 UTI (URINARY TRACT INFECTION), BACTERIAL: ICD-10-CM

## 2021-11-21 DIAGNOSIS — A41.9 SEPSIS, DUE TO UNSPECIFIED ORGANISM, UNSPECIFIED WHETHER ACUTE ORGAN DYSFUNCTION PRESENT (HCC): ICD-10-CM

## 2021-11-21 DIAGNOSIS — A49.9 UTI (URINARY TRACT INFECTION), BACTERIAL: ICD-10-CM

## 2021-11-21 LAB
A-A DO2: 40.7 MMHG (ref 0–300)
ALBUMIN SERPL-MCNC: 4.19 G/DL (ref 3.5–5.2)
ALBUMIN/GLOB SERPL: 1.2 G/DL
ALP SERPL-CCNC: 68 U/L (ref 39–117)
ALT SERPL W P-5'-P-CCNC: 35 U/L (ref 1–33)
ANION GAP SERPL CALCULATED.3IONS-SCNC: 14.5 MMOL/L (ref 5–15)
ARTERIAL PATENCY WRIST A: ABNORMAL
AST SERPL-CCNC: 55 U/L (ref 1–32)
ATMOSPHERIC PRESS: 731 MMHG
B PARAPERT DNA SPEC QL NAA+PROBE: NOT DETECTED
B PERT DNA SPEC QL NAA+PROBE: NOT DETECTED
BACTERIA UR QL AUTO: ABNORMAL /HPF
BASE EXCESS BLDA CALC-SCNC: 5 MMOL/L (ref 0–2)
BASOPHILS # BLD AUTO: 0.01 10*3/MM3 (ref 0–0.2)
BASOPHILS NFR BLD AUTO: 0.2 % (ref 0–1.5)
BDY SITE: ABNORMAL
BILIRUB SERPL-MCNC: 0.9 MG/DL (ref 0–1.2)
BILIRUB UR QL STRIP: ABNORMAL
BODY TEMPERATURE: 0 C
BUN SERPL-MCNC: 36 MG/DL (ref 8–23)
BUN/CREAT SERPL: 42.9 (ref 7–25)
C PNEUM DNA NPH QL NAA+NON-PROBE: NOT DETECTED
CALCIUM SPEC-SCNC: 9.3 MG/DL (ref 8.6–10.5)
CHLORIDE SERPL-SCNC: 100 MMOL/L (ref 98–107)
CLARITY UR: ABNORMAL
CO2 BLDA-SCNC: 30.4 MMOL/L (ref 22–33)
CO2 SERPL-SCNC: 27.5 MMOL/L (ref 22–29)
COHGB MFR BLD: 1.3 % (ref 0–5)
COLOR UR: ABNORMAL
CREAT SERPL-MCNC: 0.84 MG/DL (ref 0.57–1)
CRP SERPL-MCNC: 2.17 MG/DL (ref 0–0.5)
D DIMER PPP FEU-MCNC: 0.5 MCGFEU/ML (ref 0–0.5)
D-LACTATE SERPL-SCNC: 3 MMOL/L (ref 0.5–2)
D-LACTATE SERPL-SCNC: 3.4 MMOL/L (ref 0.5–2)
DEPRECATED RDW RBC AUTO: 48.8 FL (ref 37–54)
EOSINOPHIL # BLD AUTO: 0 10*3/MM3 (ref 0–0.4)
EOSINOPHIL NFR BLD AUTO: 0 % (ref 0.3–6.2)
ERYTHROCYTE [DISTWIDTH] IN BLOOD BY AUTOMATED COUNT: 12.8 % (ref 12.3–15.4)
ERYTHROCYTE [SEDIMENTATION RATE] IN BLOOD: 10 MM/HR (ref 0–30)
FLUAV SUBTYP SPEC NAA+PROBE: NOT DETECTED
FLUBV RNA ISLT QL NAA+PROBE: NOT DETECTED
GFR SERPL CREATININE-BSD FRML MDRD: 68 ML/MIN/1.73
GLOBULIN UR ELPH-MCNC: 3.6 GM/DL
GLUCOSE SERPL-MCNC: 149 MG/DL (ref 65–99)
GLUCOSE UR STRIP-MCNC: NEGATIVE MG/DL
HADV DNA SPEC NAA+PROBE: NOT DETECTED
HAV IGM SERPL QL IA: NORMAL
HBV CORE IGM SERPL QL IA: NORMAL
HBV SURFACE AG SERPL QL IA: NORMAL
HCO3 BLDA-SCNC: 29.1 MMOL/L (ref 20–26)
HCOV 229E RNA SPEC QL NAA+PROBE: NOT DETECTED
HCOV HKU1 RNA SPEC QL NAA+PROBE: NOT DETECTED
HCOV NL63 RNA SPEC QL NAA+PROBE: NOT DETECTED
HCOV OC43 RNA SPEC QL NAA+PROBE: NOT DETECTED
HCT VFR BLD AUTO: 49.9 % (ref 34–46.6)
HCT VFR BLD CALC: 47.1 % (ref 38–51)
HCV AB SER DONR QL: NORMAL
HGB BLD-MCNC: 16.2 G/DL (ref 12–15.9)
HGB BLDA-MCNC: 15.4 G/DL (ref 13.5–17.5)
HGB UR QL STRIP.AUTO: ABNORMAL
HMPV RNA NPH QL NAA+NON-PROBE: NOT DETECTED
HOLD SPECIMEN: NORMAL
HOLD SPECIMEN: NORMAL
HPIV1 RNA ISLT QL NAA+PROBE: NOT DETECTED
HPIV2 RNA SPEC QL NAA+PROBE: NOT DETECTED
HPIV3 RNA NPH QL NAA+PROBE: NOT DETECTED
HPIV4 P GENE NPH QL NAA+PROBE: NOT DETECTED
HYALINE CASTS UR QL AUTO: ABNORMAL /LPF
IMM GRANULOCYTES # BLD AUTO: 0.02 10*3/MM3 (ref 0–0.05)
IMM GRANULOCYTES NFR BLD AUTO: 0.4 % (ref 0–0.5)
INHALED O2 CONCENTRATION: 21 %
INR PPP: 0.86 (ref 0.9–1.1)
KETONES UR QL STRIP: ABNORMAL
L PNEUMO1 AG UR QL IA: NEGATIVE
LEUKOCYTE ESTERASE UR QL STRIP.AUTO: ABNORMAL
LIPASE SERPL-CCNC: 19 U/L (ref 13–60)
LYMPHOCYTES # BLD AUTO: 1.23 10*3/MM3 (ref 0.7–3.1)
LYMPHOCYTES NFR BLD AUTO: 25.8 % (ref 19.6–45.3)
Lab: ABNORMAL
M PNEUMO IGG SER IA-ACNC: NOT DETECTED
MAGNESIUM SERPL-MCNC: 2.2 MG/DL (ref 1.6–2.4)
MCH RBC QN AUTO: 33.1 PG (ref 26.6–33)
MCHC RBC AUTO-ENTMCNC: 32.5 G/DL (ref 31.5–35.7)
MCV RBC AUTO: 102 FL (ref 79–97)
METHGB BLD QL: 0.1 % (ref 0–3)
MODALITY: ABNORMAL
MONOCYTES # BLD AUTO: 0.39 10*3/MM3 (ref 0.1–0.9)
MONOCYTES NFR BLD AUTO: 8.2 % (ref 5–12)
NEUTROPHILS NFR BLD AUTO: 3.12 10*3/MM3 (ref 1.7–7)
NEUTROPHILS NFR BLD AUTO: 65.4 % (ref 42.7–76)
NITRITE UR QL STRIP: POSITIVE
NOTE: ABNORMAL
NRBC BLD AUTO-RTO: 0 /100 WBC (ref 0–0.2)
NT-PROBNP SERPL-MCNC: 111.9 PG/ML (ref 0–900)
OXYHGB MFR BLDV: 89.6 % (ref 94–99)
PCO2 BLDA: 40.3 MM HG (ref 35–45)
PCO2 TEMP ADJ BLD: ABNORMAL MM[HG]
PH BLDA: 7.47 PH UNITS (ref 7.35–7.45)
PH UR STRIP.AUTO: 5.5 [PH] (ref 5–8)
PH, TEMP CORRECTED: ABNORMAL
PLATELET # BLD AUTO: 124 10*3/MM3 (ref 140–450)
PMV BLD AUTO: 11.2 FL (ref 6–12)
PO2 BLDA: 57.5 MM HG (ref 83–108)
PO2 TEMP ADJ BLD: ABNORMAL MM[HG]
POTASSIUM SERPL-SCNC: 3.8 MMOL/L (ref 3.5–5.2)
PROT SERPL-MCNC: 7.8 G/DL (ref 6–8.5)
PROT UR QL STRIP: ABNORMAL
PROTHROMBIN TIME: 12.1 SECONDS (ref 12.8–14.5)
RBC # BLD AUTO: 4.89 10*6/MM3 (ref 3.77–5.28)
RBC # UR STRIP: ABNORMAL /HPF
REF LAB TEST METHOD: ABNORMAL
RHINOVIRUS RNA SPEC NAA+PROBE: NOT DETECTED
RSV RNA NPH QL NAA+NON-PROBE: NOT DETECTED
SAO2 % BLDCOA: 90.9 % (ref 94–99)
SARS-COV-2 RNA NPH QL NAA+NON-PROBE: NOT DETECTED
SODIUM SERPL-SCNC: 142 MMOL/L (ref 136–145)
SP GR UR STRIP: 1.02 (ref 1–1.03)
SQUAMOUS #/AREA URNS HPF: ABNORMAL /HPF
TROPONIN T SERPL-MCNC: <0.01 NG/ML (ref 0–0.03)
TROPONIN T SERPL-MCNC: <0.01 NG/ML (ref 0–0.03)
TSH SERPL DL<=0.05 MIU/L-ACNC: 4.4 UIU/ML (ref 0.27–4.2)
UROBILINOGEN UR QL STRIP: ABNORMAL
VENTILATOR MODE: ABNORMAL
WBC # UR STRIP: ABNORMAL /HPF
WBC NRBC COR # BLD: 4.77 10*3/MM3 (ref 3.4–10.8)
WHOLE BLOOD HOLD SPECIMEN: NORMAL
WHOLE BLOOD HOLD SPECIMEN: NORMAL
YEAST URNS QL MICRO: ABNORMAL /HPF

## 2021-11-21 PROCEDURE — 71275 CT ANGIOGRAPHY CHEST: CPT

## 2021-11-21 PROCEDURE — 80053 COMPREHEN METABOLIC PANEL: CPT | Performed by: EMERGENCY MEDICINE

## 2021-11-21 PROCEDURE — 25010000002 ENOXAPARIN PER 10 MG: Performed by: STUDENT IN AN ORGANIZED HEALTH CARE EDUCATION/TRAINING PROGRAM

## 2021-11-21 PROCEDURE — 84484 ASSAY OF TROPONIN QUANT: CPT | Performed by: EMERGENCY MEDICINE

## 2021-11-21 PROCEDURE — 87305 ASPERGILLUS AG IA: CPT | Performed by: STUDENT IN AN ORGANIZED HEALTH CARE EDUCATION/TRAINING PROGRAM

## 2021-11-21 PROCEDURE — 83690 ASSAY OF LIPASE: CPT | Performed by: EMERGENCY MEDICINE

## 2021-11-21 PROCEDURE — 83605 ASSAY OF LACTIC ACID: CPT | Performed by: EMERGENCY MEDICINE

## 2021-11-21 PROCEDURE — 0202U NFCT DS 22 TRGT SARS-COV-2: CPT | Performed by: EMERGENCY MEDICINE

## 2021-11-21 PROCEDURE — 84484 ASSAY OF TROPONIN QUANT: CPT | Performed by: STUDENT IN AN ORGANIZED HEALTH CARE EDUCATION/TRAINING PROGRAM

## 2021-11-21 PROCEDURE — 94799 UNLISTED PULMONARY SVC/PX: CPT

## 2021-11-21 PROCEDURE — 84443 ASSAY THYROID STIM HORMONE: CPT | Performed by: EMERGENCY MEDICINE

## 2021-11-21 PROCEDURE — 36600 WITHDRAWAL OF ARTERIAL BLOOD: CPT

## 2021-11-21 PROCEDURE — 85379 FIBRIN DEGRADATION QUANT: CPT | Performed by: EMERGENCY MEDICINE

## 2021-11-21 PROCEDURE — 85025 COMPLETE CBC W/AUTO DIFF WBC: CPT | Performed by: EMERGENCY MEDICINE

## 2021-11-21 PROCEDURE — 94640 AIRWAY INHALATION TREATMENT: CPT

## 2021-11-21 PROCEDURE — 74177 CT ABD & PELVIS W/CONTRAST: CPT | Performed by: RADIOLOGY

## 2021-11-21 PROCEDURE — 99223 1ST HOSP IP/OBS HIGH 75: CPT | Performed by: STUDENT IN AN ORGANIZED HEALTH CARE EDUCATION/TRAINING PROGRAM

## 2021-11-21 PROCEDURE — 25010000002 PIPERACILLIN SOD-TAZOBACTAM PER 1 G: Performed by: STUDENT IN AN ORGANIZED HEALTH CARE EDUCATION/TRAINING PROGRAM

## 2021-11-21 PROCEDURE — 0 IOPAMIDOL PER 1 ML: Performed by: EMERGENCY MEDICINE

## 2021-11-21 PROCEDURE — 93005 ELECTROCARDIOGRAM TRACING: CPT | Performed by: EMERGENCY MEDICINE

## 2021-11-21 PROCEDURE — 71275 CT ANGIOGRAPHY CHEST: CPT | Performed by: RADIOLOGY

## 2021-11-21 PROCEDURE — 82375 ASSAY CARBOXYHB QUANT: CPT

## 2021-11-21 PROCEDURE — 87186 SC STD MICRODIL/AGAR DIL: CPT | Performed by: EMERGENCY MEDICINE

## 2021-11-21 PROCEDURE — 87077 CULTURE AEROBIC IDENTIFY: CPT | Performed by: EMERGENCY MEDICINE

## 2021-11-21 PROCEDURE — 25010000002 CEFTRIAXONE PER 250 MG: Performed by: EMERGENCY MEDICINE

## 2021-11-21 PROCEDURE — 85652 RBC SED RATE AUTOMATED: CPT | Performed by: STUDENT IN AN ORGANIZED HEALTH CARE EDUCATION/TRAINING PROGRAM

## 2021-11-21 PROCEDURE — 99285 EMERGENCY DEPT VISIT HI MDM: CPT

## 2021-11-21 PROCEDURE — 83880 ASSAY OF NATRIURETIC PEPTIDE: CPT | Performed by: EMERGENCY MEDICINE

## 2021-11-21 PROCEDURE — 71045 X-RAY EXAM CHEST 1 VIEW: CPT

## 2021-11-21 PROCEDURE — 86140 C-REACTIVE PROTEIN: CPT | Performed by: EMERGENCY MEDICINE

## 2021-11-21 PROCEDURE — 87086 URINE CULTURE/COLONY COUNT: CPT | Performed by: EMERGENCY MEDICINE

## 2021-11-21 PROCEDURE — 82805 BLOOD GASES W/O2 SATURATION: CPT

## 2021-11-21 PROCEDURE — 84145 PROCALCITONIN (PCT): CPT | Performed by: STUDENT IN AN ORGANIZED HEALTH CARE EDUCATION/TRAINING PROGRAM

## 2021-11-21 PROCEDURE — 83735 ASSAY OF MAGNESIUM: CPT | Performed by: EMERGENCY MEDICINE

## 2021-11-21 PROCEDURE — 87899 AGENT NOS ASSAY W/OPTIC: CPT | Performed by: STUDENT IN AN ORGANIZED HEALTH CARE EDUCATION/TRAINING PROGRAM

## 2021-11-21 PROCEDURE — 87040 BLOOD CULTURE FOR BACTERIA: CPT | Performed by: EMERGENCY MEDICINE

## 2021-11-21 PROCEDURE — 85610 PROTHROMBIN TIME: CPT | Performed by: STUDENT IN AN ORGANIZED HEALTH CARE EDUCATION/TRAINING PROGRAM

## 2021-11-21 PROCEDURE — 81001 URINALYSIS AUTO W/SCOPE: CPT | Performed by: EMERGENCY MEDICINE

## 2021-11-21 PROCEDURE — 25010000002 METHYLPREDNISOLONE PER 40 MG: Performed by: EMERGENCY MEDICINE

## 2021-11-21 PROCEDURE — 80074 ACUTE HEPATITIS PANEL: CPT | Performed by: STUDENT IN AN ORGANIZED HEALTH CARE EDUCATION/TRAINING PROGRAM

## 2021-11-21 PROCEDURE — 83050 HGB METHEMOGLOBIN QUAN: CPT

## 2021-11-21 PROCEDURE — 74177 CT ABD & PELVIS W/CONTRAST: CPT

## 2021-11-21 RX ORDER — ALBUTEROL SULFATE 2.5 MG/3ML
2.5 SOLUTION RESPIRATORY (INHALATION) EVERY 6 HOURS PRN
COMMUNITY

## 2021-11-21 RX ORDER — FOLIC ACID 1 MG/1
500 TABLET ORAL EVERY OTHER DAY
Status: DISCONTINUED | OUTPATIENT
Start: 2021-11-21 | End: 2021-11-25 | Stop reason: HOSPADM

## 2021-11-21 RX ORDER — SODIUM CHLORIDE 0.9 % (FLUSH) 0.9 %
10 SYRINGE (ML) INJECTION EVERY 12 HOURS SCHEDULED
Status: DISCONTINUED | OUTPATIENT
Start: 2021-11-21 | End: 2021-11-25 | Stop reason: HOSPADM

## 2021-11-21 RX ORDER — ROSUVASTATIN CALCIUM 20 MG/1
40 TABLET, COATED ORAL EVERY OTHER DAY
Status: DISCONTINUED | OUTPATIENT
Start: 2021-11-21 | End: 2021-11-25 | Stop reason: HOSPADM

## 2021-11-21 RX ORDER — SODIUM CHLORIDE 0.9 % (FLUSH) 0.9 %
10 SYRINGE (ML) INJECTION AS NEEDED
Status: DISCONTINUED | OUTPATIENT
Start: 2021-11-21 | End: 2021-11-25 | Stop reason: HOSPADM

## 2021-11-21 RX ORDER — PANTOPRAZOLE SODIUM 40 MG/1
40 TABLET, DELAYED RELEASE ORAL
Status: DISCONTINUED | OUTPATIENT
Start: 2021-11-22 | End: 2021-11-25 | Stop reason: HOSPADM

## 2021-11-21 RX ORDER — IPRATROPIUM BROMIDE AND ALBUTEROL SULFATE 2.5; .5 MG/3ML; MG/3ML
3 SOLUTION RESPIRATORY (INHALATION)
Status: DISCONTINUED | OUTPATIENT
Start: 2021-11-21 | End: 2021-11-25 | Stop reason: HOSPADM

## 2021-11-21 RX ORDER — METHYLPREDNISOLONE SODIUM SUCCINATE 40 MG/ML
60 INJECTION, POWDER, LYOPHILIZED, FOR SOLUTION INTRAMUSCULAR; INTRAVENOUS ONCE
Status: COMPLETED | OUTPATIENT
Start: 2021-11-21 | End: 2021-11-21

## 2021-11-21 RX ORDER — IPRATROPIUM BROMIDE AND ALBUTEROL SULFATE 2.5; .5 MG/3ML; MG/3ML
3 SOLUTION RESPIRATORY (INHALATION) ONCE
Status: COMPLETED | OUTPATIENT
Start: 2021-11-21 | End: 2021-11-21

## 2021-11-21 RX ORDER — POTASSIUM CHLORIDE 1.5 G/1.77G
40 POWDER, FOR SOLUTION ORAL AS NEEDED
Status: DISCONTINUED | OUTPATIENT
Start: 2021-11-21 | End: 2021-11-25 | Stop reason: HOSPADM

## 2021-11-21 RX ORDER — ACETAMINOPHEN 325 MG/1
650 TABLET ORAL EVERY 4 HOURS PRN
Status: DISCONTINUED | OUTPATIENT
Start: 2021-11-21 | End: 2021-11-25 | Stop reason: HOSPADM

## 2021-11-21 RX ORDER — BUDESONIDE AND FORMOTEROL FUMARATE DIHYDRATE 80; 4.5 UG/1; UG/1
2 AEROSOL RESPIRATORY (INHALATION)
Status: DISCONTINUED | OUTPATIENT
Start: 2021-11-21 | End: 2021-11-22 | Stop reason: DRUGHIGH

## 2021-11-21 RX ORDER — MAGNESIUM SULFATE HEPTAHYDRATE 40 MG/ML
2 INJECTION, SOLUTION INTRAVENOUS AS NEEDED
Status: DISCONTINUED | OUTPATIENT
Start: 2021-11-21 | End: 2021-11-25 | Stop reason: HOSPADM

## 2021-11-21 RX ORDER — POTASSIUM CHLORIDE 7.45 MG/ML
10 INJECTION INTRAVENOUS
Status: DISCONTINUED | OUTPATIENT
Start: 2021-11-21 | End: 2021-11-25 | Stop reason: HOSPADM

## 2021-11-21 RX ORDER — BISACODYL 5 MG/1
5 TABLET, DELAYED RELEASE ORAL DAILY PRN
Status: DISCONTINUED | OUTPATIENT
Start: 2021-11-21 | End: 2021-11-25 | Stop reason: HOSPADM

## 2021-11-21 RX ORDER — NICOTINE 21 MG/24HR
1 PATCH, TRANSDERMAL 24 HOURS TRANSDERMAL
Status: DISCONTINUED | OUTPATIENT
Start: 2021-11-21 | End: 2021-11-25 | Stop reason: HOSPADM

## 2021-11-21 RX ORDER — SODIUM CHLORIDE, SODIUM LACTATE, POTASSIUM CHLORIDE, CALCIUM CHLORIDE 600; 310; 30; 20 MG/100ML; MG/100ML; MG/100ML; MG/100ML
150 INJECTION, SOLUTION INTRAVENOUS ONCE
Status: COMPLETED | OUTPATIENT
Start: 2021-11-21 | End: 2021-11-21

## 2021-11-21 RX ORDER — ASPIRIN AND DIPYRIDAMOLE 25; 200 MG/1; MG/1
1 CAPSULE, EXTENDED RELEASE ORAL 2 TIMES DAILY
Status: DISCONTINUED | OUTPATIENT
Start: 2021-11-21 | End: 2021-11-25 | Stop reason: HOSPADM

## 2021-11-21 RX ORDER — ALBUTEROL SULFATE 90 UG/1
2 AEROSOL, METERED RESPIRATORY (INHALATION) EVERY 6 HOURS PRN
COMMUNITY

## 2021-11-21 RX ORDER — BISACODYL 10 MG
10 SUPPOSITORY, RECTAL RECTAL DAILY PRN
Status: DISCONTINUED | OUTPATIENT
Start: 2021-11-21 | End: 2021-11-25 | Stop reason: HOSPADM

## 2021-11-21 RX ORDER — NICOTINE 21 MG/24HR
1 PATCH, TRANSDERMAL 24 HOURS TRANSDERMAL
Status: DISCONTINUED | OUTPATIENT
Start: 2021-11-21 | End: 2021-11-21

## 2021-11-21 RX ORDER — AMOXICILLIN 250 MG
2 CAPSULE ORAL 2 TIMES DAILY
Status: DISCONTINUED | OUTPATIENT
Start: 2021-11-21 | End: 2021-11-25 | Stop reason: HOSPADM

## 2021-11-21 RX ORDER — SODIUM CHLORIDE 9 MG/ML
125 INJECTION, SOLUTION INTRAVENOUS CONTINUOUS
Status: DISCONTINUED | OUTPATIENT
Start: 2021-11-21 | End: 2021-11-21

## 2021-11-21 RX ORDER — PENTOXIFYLLINE 400 MG/1
400 TABLET, EXTENDED RELEASE ORAL
Status: DISCONTINUED | OUTPATIENT
Start: 2021-11-21 | End: 2021-11-25 | Stop reason: HOSPADM

## 2021-11-21 RX ORDER — ALBUTEROL SULFATE 90 UG/1
2 AEROSOL, METERED RESPIRATORY (INHALATION)
Status: COMPLETED | OUTPATIENT
Start: 2021-11-21 | End: 2021-11-21

## 2021-11-21 RX ORDER — ONDANSETRON 4 MG/1
4 TABLET, FILM COATED ORAL EVERY 6 HOURS PRN
Status: DISCONTINUED | OUTPATIENT
Start: 2021-11-21 | End: 2021-11-22

## 2021-11-21 RX ORDER — MAGNESIUM SULFATE HEPTAHYDRATE 40 MG/ML
4 INJECTION, SOLUTION INTRAVENOUS AS NEEDED
Status: DISCONTINUED | OUTPATIENT
Start: 2021-11-21 | End: 2021-11-25 | Stop reason: HOSPADM

## 2021-11-21 RX ORDER — POLYETHYLENE GLYCOL 3350 17 G/17G
17 POWDER, FOR SOLUTION ORAL DAILY PRN
Status: DISCONTINUED | OUTPATIENT
Start: 2021-11-21 | End: 2021-11-25 | Stop reason: HOSPADM

## 2021-11-21 RX ORDER — METHYLPREDNISOLONE SODIUM SUCCINATE 40 MG/ML
40 INJECTION, POWDER, LYOPHILIZED, FOR SOLUTION INTRAMUSCULAR; INTRAVENOUS DAILY
Status: DISCONTINUED | OUTPATIENT
Start: 2021-11-22 | End: 2021-11-25 | Stop reason: HOSPADM

## 2021-11-21 RX ORDER — POTASSIUM CHLORIDE 750 MG/1
40 CAPSULE, EXTENDED RELEASE ORAL AS NEEDED
Status: DISCONTINUED | OUTPATIENT
Start: 2021-11-21 | End: 2021-11-25 | Stop reason: HOSPADM

## 2021-11-21 RX ORDER — GUAIFENESIN 600 MG/1
1200 TABLET, EXTENDED RELEASE ORAL EVERY 12 HOURS SCHEDULED
Status: DISCONTINUED | OUTPATIENT
Start: 2021-11-21 | End: 2021-11-25 | Stop reason: HOSPADM

## 2021-11-21 RX ADMIN — SODIUM CHLORIDE 1035 ML: 9 INJECTION, SOLUTION INTRAVENOUS at 11:12

## 2021-11-21 RX ADMIN — DOCUSATE SODIUM 50 MG AND SENNOSIDES 8.6 MG 2 TABLET: 8.6; 5 TABLET, FILM COATED ORAL at 20:19

## 2021-11-21 RX ADMIN — SODIUM CHLORIDE 125 ML/HR: 9 INJECTION, SOLUTION INTRAVENOUS at 11:12

## 2021-11-21 RX ADMIN — ROSUVASTATIN CALCIUM 40 MG: 20 TABLET, FILM COATED ORAL at 18:19

## 2021-11-21 RX ADMIN — IOPAMIDOL 70 ML: 755 INJECTION, SOLUTION INTRAVENOUS at 11:16

## 2021-11-21 RX ADMIN — ALBUTEROL SULFATE 2 PUFF: 90 AEROSOL, METERED RESPIRATORY (INHALATION) at 09:39

## 2021-11-21 RX ADMIN — ALBUTEROL SULFATE 2 PUFF: 90 AEROSOL, METERED RESPIRATORY (INHALATION) at 09:57

## 2021-11-21 RX ADMIN — DOXYCYCLINE 100 MG: 100 INJECTION, POWDER, LYOPHILIZED, FOR SOLUTION INTRAVENOUS at 18:01

## 2021-11-21 RX ADMIN — SODIUM CHLORIDE, PRESERVATIVE FREE 10 ML: 5 INJECTION INTRAVENOUS at 20:19

## 2021-11-21 RX ADMIN — SODIUM CHLORIDE, POTASSIUM CHLORIDE, SODIUM LACTATE AND CALCIUM CHLORIDE 150 ML/HR: 600; 310; 30; 20 INJECTION, SOLUTION INTRAVENOUS at 18:00

## 2021-11-21 RX ADMIN — ASPRIN AND EXTENDED-RELEASE DIPYRIDAMOLE 1 CAPSULE: 25; 200 CAPSULE ORAL at 21:13

## 2021-11-21 RX ADMIN — BUDESONIDE AND FORMOTEROL FUMARATE DIHYDRATE 2 PUFF: 80; 4.5 AEROSOL RESPIRATORY (INHALATION) at 18:43

## 2021-11-21 RX ADMIN — PENTOXIFYLLINE 400 MG: 400 TABLET, EXTENDED RELEASE ORAL at 18:19

## 2021-11-21 RX ADMIN — DOXYCYCLINE 100 MG: 100 INJECTION, POWDER, LYOPHILIZED, FOR SOLUTION INTRAVENOUS at 14:30

## 2021-11-21 RX ADMIN — PIPERACILLIN SODIUM AND TAZOBACTAM SODIUM 3.38 G: 3; .375 INJECTION, POWDER, LYOPHILIZED, FOR SOLUTION INTRAVENOUS at 18:15

## 2021-11-21 RX ADMIN — ENOXAPARIN SODIUM 40 MG: 40 INJECTION SUBCUTANEOUS at 18:19

## 2021-11-21 RX ADMIN — FOLIC ACID 500 MCG: 1 TABLET ORAL at 18:19

## 2021-11-21 RX ADMIN — ALBUTEROL SULFATE 2 PUFF: 90 AEROSOL, METERED RESPIRATORY (INHALATION) at 09:48

## 2021-11-21 RX ADMIN — SODIUM CHLORIDE 2 G: 9 INJECTION, SOLUTION INTRAVENOUS at 11:12

## 2021-11-21 RX ADMIN — IPRATROPIUM BROMIDE AND ALBUTEROL SULFATE 3 ML: .5; 3 SOLUTION RESPIRATORY (INHALATION) at 14:04

## 2021-11-21 RX ADMIN — METHYLPREDNISOLONE SODIUM SUCCINATE 60 MG: 40 INJECTION, POWDER, FOR SOLUTION INTRAMUSCULAR; INTRAVENOUS at 09:39

## 2021-11-21 RX ADMIN — IPRATROPIUM BROMIDE AND ALBUTEROL SULFATE 3 ML: .5; 2.5 SOLUTION RESPIRATORY (INHALATION) at 18:43

## 2021-11-21 RX ADMIN — GUAIFENESIN 1200 MG: 600 TABLET, EXTENDED RELEASE ORAL at 20:19

## 2021-11-21 RX ADMIN — NICOTINE 1 PATCH: 14 PATCH TRANSDERMAL at 18:29

## 2021-11-22 ENCOUNTER — APPOINTMENT (OUTPATIENT)
Dept: ULTRASOUND IMAGING | Facility: HOSPITAL | Age: 66
End: 2021-11-22

## 2021-11-22 ENCOUNTER — APPOINTMENT (OUTPATIENT)
Dept: GENERAL RADIOLOGY | Facility: HOSPITAL | Age: 66
End: 2021-11-22

## 2021-11-22 PROBLEM — E43 SEVERE MALNUTRITION: Status: ACTIVE | Noted: 2021-11-22

## 2021-11-22 LAB
ALBUMIN SERPL-MCNC: 3.22 G/DL (ref 3.5–5.2)
ALBUMIN/GLOB SERPL: 1.3 G/DL
ALP SERPL-CCNC: 48 U/L (ref 39–117)
ALT SERPL W P-5'-P-CCNC: 20 U/L (ref 1–33)
ANION GAP SERPL CALCULATED.3IONS-SCNC: 11.6 MMOL/L (ref 5–15)
AST SERPL-CCNC: 29 U/L (ref 1–32)
BASOPHILS # BLD AUTO: 0 10*3/MM3 (ref 0–0.2)
BASOPHILS NFR BLD AUTO: 0 % (ref 0–1.5)
BILIRUB SERPL-MCNC: 0.5 MG/DL (ref 0–1.2)
BUN SERPL-MCNC: 24 MG/DL (ref 8–23)
BUN/CREAT SERPL: 46.2 (ref 7–25)
CALCIUM SPEC-SCNC: 8.2 MG/DL (ref 8.6–10.5)
CHLORIDE SERPL-SCNC: 109 MMOL/L (ref 98–107)
CO2 SERPL-SCNC: 23.4 MMOL/L (ref 22–29)
CREAT SERPL-MCNC: 0.52 MG/DL (ref 0.57–1)
DEPRECATED RDW RBC AUTO: 47.1 FL (ref 37–54)
EOSINOPHIL # BLD AUTO: 0 10*3/MM3 (ref 0–0.4)
EOSINOPHIL NFR BLD AUTO: 0 % (ref 0.3–6.2)
ERYTHROCYTE [DISTWIDTH] IN BLOOD BY AUTOMATED COUNT: 12.7 % (ref 12.3–15.4)
GFR SERPL CREATININE-BSD FRML MDRD: 118 ML/MIN/1.73
GLOBULIN UR ELPH-MCNC: 2.5 GM/DL
GLUCOSE SERPL-MCNC: 107 MG/DL (ref 65–99)
HCT VFR BLD AUTO: 33.9 % (ref 34–46.6)
HGB BLD-MCNC: 11.3 G/DL (ref 12–15.9)
IMM GRANULOCYTES # BLD AUTO: 0.01 10*3/MM3 (ref 0–0.05)
IMM GRANULOCYTES NFR BLD AUTO: 0.3 % (ref 0–0.5)
LYMPHOCYTES # BLD AUTO: 0.63 10*3/MM3 (ref 0.7–3.1)
LYMPHOCYTES NFR BLD AUTO: 20.7 % (ref 19.6–45.3)
MCH RBC QN AUTO: 33.8 PG (ref 26.6–33)
MCHC RBC AUTO-ENTMCNC: 33.3 G/DL (ref 31.5–35.7)
MCV RBC AUTO: 101.5 FL (ref 79–97)
MONOCYTES # BLD AUTO: 0.43 10*3/MM3 (ref 0.1–0.9)
MONOCYTES NFR BLD AUTO: 14.1 % (ref 5–12)
NEUTROPHILS NFR BLD AUTO: 1.97 10*3/MM3 (ref 1.7–7)
NEUTROPHILS NFR BLD AUTO: 64.9 % (ref 42.7–76)
NRBC BLD AUTO-RTO: 0 /100 WBC (ref 0–0.2)
PLATELET # BLD AUTO: 101 10*3/MM3 (ref 140–450)
PMV BLD AUTO: 11.5 FL (ref 6–12)
POTASSIUM SERPL-SCNC: 3.1 MMOL/L (ref 3.5–5.2)
PROCALCITONIN SERPL-MCNC: 0.06 NG/ML (ref 0–0.25)
PROT SERPL-MCNC: 5.7 G/DL (ref 6–8.5)
QT INTERVAL: 344 MS
QTC INTERVAL: 450 MS
RBC # BLD AUTO: 3.34 10*6/MM3 (ref 3.77–5.28)
SODIUM SERPL-SCNC: 144 MMOL/L (ref 136–145)
WBC NRBC COR # BLD: 3.04 10*3/MM3 (ref 3.4–10.8)

## 2021-11-22 PROCEDURE — 25010000002 PIPERACILLIN SOD-TAZOBACTAM PER 1 G: Performed by: STUDENT IN AN ORGANIZED HEALTH CARE EDUCATION/TRAINING PROGRAM

## 2021-11-22 PROCEDURE — 25010000002 PROCHLORPERAZINE 10 MG/2ML SOLUTION: Performed by: INTERNAL MEDICINE

## 2021-11-22 PROCEDURE — 25010000002 METHYLPREDNISOLONE PER 40 MG: Performed by: STUDENT IN AN ORGANIZED HEALTH CARE EDUCATION/TRAINING PROGRAM

## 2021-11-22 PROCEDURE — 85025 COMPLETE CBC W/AUTO DIFF WBC: CPT | Performed by: STUDENT IN AN ORGANIZED HEALTH CARE EDUCATION/TRAINING PROGRAM

## 2021-11-22 PROCEDURE — 97162 PT EVAL MOD COMPLEX 30 MIN: CPT

## 2021-11-22 PROCEDURE — 25010000002 ONDANSETRON PER 1 MG: Performed by: PHYSICIAN ASSISTANT

## 2021-11-22 PROCEDURE — 76700 US EXAM ABDOM COMPLETE: CPT | Performed by: RADIOLOGY

## 2021-11-22 PROCEDURE — 0 POTASSIUM CHLORIDE 10 MEQ/100ML SOLUTION: Performed by: STUDENT IN AN ORGANIZED HEALTH CARE EDUCATION/TRAINING PROGRAM

## 2021-11-22 PROCEDURE — 94799 UNLISTED PULMONARY SVC/PX: CPT

## 2021-11-22 PROCEDURE — 74230 X-RAY XM SWLNG FUNCJ C+: CPT | Performed by: RADIOLOGY

## 2021-11-22 PROCEDURE — 76700 US EXAM ABDOM COMPLETE: CPT

## 2021-11-22 PROCEDURE — 92611 MOTION FLUOROSCOPY/SWALLOW: CPT

## 2021-11-22 PROCEDURE — 25010000002 ENOXAPARIN PER 10 MG: Performed by: STUDENT IN AN ORGANIZED HEALTH CARE EDUCATION/TRAINING PROGRAM

## 2021-11-22 PROCEDURE — 99221 1ST HOSP IP/OBS SF/LOW 40: CPT | Performed by: SURGERY

## 2021-11-22 PROCEDURE — 97165 OT EVAL LOW COMPLEX 30 MIN: CPT

## 2021-11-22 PROCEDURE — 80053 COMPREHEN METABOLIC PANEL: CPT | Performed by: STUDENT IN AN ORGANIZED HEALTH CARE EDUCATION/TRAINING PROGRAM

## 2021-11-22 PROCEDURE — 99233 SBSQ HOSP IP/OBS HIGH 50: CPT | Performed by: INTERNAL MEDICINE

## 2021-11-22 PROCEDURE — 74230 X-RAY XM SWLNG FUNCJ C+: CPT

## 2021-11-22 RX ORDER — POTASSIUM CHLORIDE 20 MEQ/1
40 TABLET, EXTENDED RELEASE ORAL EVERY 4 HOURS
Status: DISCONTINUED | OUTPATIENT
Start: 2021-11-22 | End: 2021-11-22 | Stop reason: SDDI

## 2021-11-22 RX ORDER — PROCHLORPERAZINE EDISYLATE 5 MG/ML
2.5 INJECTION INTRAMUSCULAR; INTRAVENOUS EVERY 8 HOURS PRN
Status: DISCONTINUED | OUTPATIENT
Start: 2021-11-22 | End: 2021-11-25 | Stop reason: HOSPADM

## 2021-11-22 RX ORDER — ALBUTEROL SULFATE 2.5 MG/3ML
2.5 SOLUTION RESPIRATORY (INHALATION) EVERY 6 HOURS PRN
Status: DISCONTINUED | OUTPATIENT
Start: 2021-11-22 | End: 2021-11-25 | Stop reason: HOSPADM

## 2021-11-22 RX ORDER — ONDANSETRON 2 MG/ML
4 INJECTION INTRAMUSCULAR; INTRAVENOUS EVERY 6 HOURS PRN
Status: DISCONTINUED | OUTPATIENT
Start: 2021-11-22 | End: 2021-11-25 | Stop reason: HOSPADM

## 2021-11-22 RX ORDER — BUDESONIDE AND FORMOTEROL FUMARATE DIHYDRATE 160; 4.5 UG/1; UG/1
2 AEROSOL RESPIRATORY (INHALATION)
Status: DISCONTINUED | OUTPATIENT
Start: 2021-11-22 | End: 2021-11-25 | Stop reason: HOSPADM

## 2021-11-22 RX ORDER — POTASSIUM CHLORIDE 1.5 G/1.77G
40 POWDER, FOR SOLUTION ORAL EVERY 4 HOURS
Status: DISPENSED | OUTPATIENT
Start: 2021-11-22 | End: 2021-11-22

## 2021-11-22 RX ORDER — POTASSIUM CHLORIDE 7.45 MG/ML
10 INJECTION INTRAVENOUS
Status: DISCONTINUED | OUTPATIENT
Start: 2021-11-22 | End: 2021-11-22 | Stop reason: SDDI

## 2021-11-22 RX ADMIN — PIPERACILLIN SODIUM AND TAZOBACTAM SODIUM 3.38 G: 3; .375 INJECTION, POWDER, LYOPHILIZED, FOR SOLUTION INTRAVENOUS at 15:06

## 2021-11-22 RX ADMIN — DOCUSATE SODIUM 50 MG AND SENNOSIDES 8.6 MG 2 TABLET: 8.6; 5 TABLET, FILM COATED ORAL at 20:39

## 2021-11-22 RX ADMIN — GUAIFENESIN 1200 MG: 600 TABLET, EXTENDED RELEASE ORAL at 10:51

## 2021-11-22 RX ADMIN — ENOXAPARIN SODIUM 40 MG: 40 INJECTION SUBCUTANEOUS at 17:32

## 2021-11-22 RX ADMIN — PIPERACILLIN SODIUM AND TAZOBACTAM SODIUM 3.38 G: 3; .375 INJECTION, POWDER, LYOPHILIZED, FOR SOLUTION INTRAVENOUS at 00:09

## 2021-11-22 RX ADMIN — PROCHLORPERAZINE EDISYLATE 2.5 MG: 5 INJECTION INTRAMUSCULAR; INTRAVENOUS at 15:15

## 2021-11-22 RX ADMIN — ACETAMINOPHEN 650 MG: 325 TABLET ORAL at 21:06

## 2021-11-22 RX ADMIN — DOXYCYCLINE 100 MG: 100 INJECTION, POWDER, LYOPHILIZED, FOR SOLUTION INTRAVENOUS at 05:42

## 2021-11-22 RX ADMIN — PROCHLORPERAZINE EDISYLATE 2.5 MG: 5 INJECTION INTRAMUSCULAR; INTRAVENOUS at 23:29

## 2021-11-22 RX ADMIN — IPRATROPIUM BROMIDE AND ALBUTEROL SULFATE 3 ML: .5; 2.5 SOLUTION RESPIRATORY (INHALATION) at 12:29

## 2021-11-22 RX ADMIN — NICOTINE 1 PATCH: 14 PATCH TRANSDERMAL at 10:54

## 2021-11-22 RX ADMIN — GUAIFENESIN 1200 MG: 600 TABLET, EXTENDED RELEASE ORAL at 20:39

## 2021-11-22 RX ADMIN — DOCUSATE SODIUM 50 MG AND SENNOSIDES 8.6 MG 2 TABLET: 8.6; 5 TABLET, FILM COATED ORAL at 10:50

## 2021-11-22 RX ADMIN — IPRATROPIUM BROMIDE AND ALBUTEROL SULFATE 3 ML: .5; 2.5 SOLUTION RESPIRATORY (INHALATION) at 01:40

## 2021-11-22 RX ADMIN — PENTOXIFYLLINE 400 MG: 400 TABLET, EXTENDED RELEASE ORAL at 17:32

## 2021-11-22 RX ADMIN — METHYLPREDNISOLONE SODIUM SUCCINATE 40 MG: 40 INJECTION, POWDER, FOR SOLUTION INTRAMUSCULAR; INTRAVENOUS at 09:59

## 2021-11-22 RX ADMIN — DOXYCYCLINE 100 MG: 100 INJECTION, POWDER, LYOPHILIZED, FOR SOLUTION INTRAVENOUS at 17:32

## 2021-11-22 RX ADMIN — IPRATROPIUM BROMIDE AND ALBUTEROL SULFATE 3 ML: .5; 2.5 SOLUTION RESPIRATORY (INHALATION) at 06:38

## 2021-11-22 RX ADMIN — ASPRIN AND EXTENDED-RELEASE DIPYRIDAMOLE 1 CAPSULE: 25; 200 CAPSULE ORAL at 20:39

## 2021-11-22 RX ADMIN — POTASSIUM CHLORIDE 40 MEQ: 1.5 POWDER, FOR SOLUTION ORAL at 10:50

## 2021-11-22 RX ADMIN — POTASSIUM CHLORIDE 10 MEQ: 7.46 INJECTION, SOLUTION INTRAVENOUS at 05:41

## 2021-11-22 RX ADMIN — POTASSIUM CHLORIDE 10 MEQ: 7.46 INJECTION, SOLUTION INTRAVENOUS at 04:42

## 2021-11-22 RX ADMIN — PIPERACILLIN SODIUM AND TAZOBACTAM SODIUM 3.38 G: 3; .375 INJECTION, POWDER, LYOPHILIZED, FOR SOLUTION INTRAVENOUS at 09:59

## 2021-11-22 RX ADMIN — PENTOXIFYLLINE 400 MG: 400 TABLET, EXTENDED RELEASE ORAL at 10:50

## 2021-11-22 RX ADMIN — ASPRIN AND EXTENDED-RELEASE DIPYRIDAMOLE 1 CAPSULE: 25; 200 CAPSULE ORAL at 10:51

## 2021-11-22 RX ADMIN — SODIUM CHLORIDE, PRESERVATIVE FREE 10 ML: 5 INJECTION INTRAVENOUS at 20:40

## 2021-11-22 RX ADMIN — PIPERACILLIN SODIUM AND TAZOBACTAM SODIUM 3.38 G: 3; .375 INJECTION, POWDER, LYOPHILIZED, FOR SOLUTION INTRAVENOUS at 23:29

## 2021-11-22 RX ADMIN — ONDANSETRON 4 MG: 2 INJECTION INTRAMUSCULAR; INTRAVENOUS at 03:42

## 2021-11-22 RX ADMIN — ONDANSETRON 4 MG: 2 INJECTION INTRAMUSCULAR; INTRAVENOUS at 13:45

## 2021-11-22 RX ADMIN — BUDESONIDE AND FORMOTEROL FUMARATE DIHYDRATE 2 PUFF: 80; 4.5 AEROSOL RESPIRATORY (INHALATION) at 06:38

## 2021-11-22 RX ADMIN — ACETAMINOPHEN 650 MG: 325 TABLET ORAL at 09:59

## 2021-11-23 LAB
ALBUMIN SERPL-MCNC: 3 G/DL (ref 3.5–5.2)
ALBUMIN/GLOB SERPL: 1.1 G/DL
ALP SERPL-CCNC: 59 U/L (ref 39–117)
ALT SERPL W P-5'-P-CCNC: 19 U/L (ref 1–33)
ANION GAP SERPL CALCULATED.3IONS-SCNC: 11 MMOL/L (ref 5–15)
AST SERPL-CCNC: 24 U/L (ref 1–32)
BACTERIA SPEC AEROBE CULT: ABNORMAL
BASOPHILS # BLD AUTO: 0 10*3/MM3 (ref 0–0.2)
BASOPHILS NFR BLD AUTO: 0 % (ref 0–1.5)
BILIRUB SERPL-MCNC: 0.6 MG/DL (ref 0–1.2)
BUN SERPL-MCNC: 22 MG/DL (ref 8–23)
BUN/CREAT SERPL: 34.4 (ref 7–25)
CALCIUM SPEC-SCNC: 8.1 MG/DL (ref 8.6–10.5)
CHLORIDE SERPL-SCNC: 109 MMOL/L (ref 98–107)
CO2 SERPL-SCNC: 22 MMOL/L (ref 22–29)
CREAT SERPL-MCNC: 0.64 MG/DL (ref 0.57–1)
DEPRECATED RDW RBC AUTO: 49.4 FL (ref 37–54)
EOSINOPHIL # BLD AUTO: 0 10*3/MM3 (ref 0–0.4)
EOSINOPHIL NFR BLD AUTO: 0 % (ref 0.3–6.2)
ERYTHROCYTE [DISTWIDTH] IN BLOOD BY AUTOMATED COUNT: 12.9 % (ref 12.3–15.4)
GFR SERPL CREATININE-BSD FRML MDRD: 93 ML/MIN/1.73
GLOBULIN UR ELPH-MCNC: 2.7 GM/DL
GLUCOSE SERPL-MCNC: 98 MG/DL (ref 65–99)
HCT VFR BLD AUTO: 33.9 % (ref 34–46.6)
HGB BLD-MCNC: 11.2 G/DL (ref 12–15.9)
IMM GRANULOCYTES # BLD AUTO: 0.01 10*3/MM3 (ref 0–0.05)
IMM GRANULOCYTES NFR BLD AUTO: 0.3 % (ref 0–0.5)
LYMPHOCYTES # BLD AUTO: 0.48 10*3/MM3 (ref 0.7–3.1)
LYMPHOCYTES NFR BLD AUTO: 13.6 % (ref 19.6–45.3)
MCH RBC QN AUTO: 34.9 PG (ref 26.6–33)
MCHC RBC AUTO-ENTMCNC: 33 G/DL (ref 31.5–35.7)
MCV RBC AUTO: 105.6 FL (ref 79–97)
MONOCYTES # BLD AUTO: 0.32 10*3/MM3 (ref 0.1–0.9)
MONOCYTES NFR BLD AUTO: 9 % (ref 5–12)
NEUTROPHILS NFR BLD AUTO: 2.73 10*3/MM3 (ref 1.7–7)
NEUTROPHILS NFR BLD AUTO: 77.1 % (ref 42.7–76)
NRBC BLD AUTO-RTO: 0 /100 WBC (ref 0–0.2)
PLATELET # BLD AUTO: 106 10*3/MM3 (ref 140–450)
PMV BLD AUTO: 11.1 FL (ref 6–12)
POTASSIUM SERPL-SCNC: 3.8 MMOL/L (ref 3.5–5.2)
PROT SERPL-MCNC: 5.7 G/DL (ref 6–8.5)
RBC # BLD AUTO: 3.21 10*6/MM3 (ref 3.77–5.28)
SODIUM SERPL-SCNC: 142 MMOL/L (ref 136–145)
WBC NRBC COR # BLD: 3.54 10*3/MM3 (ref 3.4–10.8)

## 2021-11-23 PROCEDURE — 80053 COMPREHEN METABOLIC PANEL: CPT | Performed by: STUDENT IN AN ORGANIZED HEALTH CARE EDUCATION/TRAINING PROGRAM

## 2021-11-23 PROCEDURE — 85025 COMPLETE CBC W/AUTO DIFF WBC: CPT | Performed by: STUDENT IN AN ORGANIZED HEALTH CARE EDUCATION/TRAINING PROGRAM

## 2021-11-23 PROCEDURE — 94799 UNLISTED PULMONARY SVC/PX: CPT

## 2021-11-23 PROCEDURE — 99232 SBSQ HOSP IP/OBS MODERATE 35: CPT | Performed by: INTERNAL MEDICINE

## 2021-11-23 PROCEDURE — 25010000002 ENOXAPARIN PER 10 MG: Performed by: STUDENT IN AN ORGANIZED HEALTH CARE EDUCATION/TRAINING PROGRAM

## 2021-11-23 PROCEDURE — 25010000002 METHYLPREDNISOLONE PER 40 MG: Performed by: STUDENT IN AN ORGANIZED HEALTH CARE EDUCATION/TRAINING PROGRAM

## 2021-11-23 PROCEDURE — 25010000002 PIPERACILLIN SOD-TAZOBACTAM PER 1 G: Performed by: STUDENT IN AN ORGANIZED HEALTH CARE EDUCATION/TRAINING PROGRAM

## 2021-11-23 RX ADMIN — BUDESONIDE AND FORMOTEROL FUMARATE DIHYDRATE 2 PUFF: 160; 4.5 AEROSOL RESPIRATORY (INHALATION) at 18:41

## 2021-11-23 RX ADMIN — SODIUM CHLORIDE, PRESERVATIVE FREE 10 ML: 5 INJECTION INTRAVENOUS at 09:28

## 2021-11-23 RX ADMIN — PENTOXIFYLLINE 400 MG: 400 TABLET, EXTENDED RELEASE ORAL at 13:11

## 2021-11-23 RX ADMIN — PIPERACILLIN SODIUM AND TAZOBACTAM SODIUM 3.38 G: 3; .375 INJECTION, POWDER, LYOPHILIZED, FOR SOLUTION INTRAVENOUS at 09:43

## 2021-11-23 RX ADMIN — IPRATROPIUM BROMIDE AND ALBUTEROL SULFATE 3 ML: .5; 2.5 SOLUTION RESPIRATORY (INHALATION) at 06:48

## 2021-11-23 RX ADMIN — BUDESONIDE AND FORMOTEROL FUMARATE DIHYDRATE 2 PUFF: 160; 4.5 AEROSOL RESPIRATORY (INHALATION) at 06:48

## 2021-11-23 RX ADMIN — PENTOXIFYLLINE 400 MG: 400 TABLET, EXTENDED RELEASE ORAL at 17:48

## 2021-11-23 RX ADMIN — DOXYCYCLINE 100 MG: 100 INJECTION, POWDER, LYOPHILIZED, FOR SOLUTION INTRAVENOUS at 05:48

## 2021-11-23 RX ADMIN — PENTOXIFYLLINE 400 MG: 400 TABLET, EXTENDED RELEASE ORAL at 09:27

## 2021-11-23 RX ADMIN — SODIUM CHLORIDE, PRESERVATIVE FREE 10 ML: 5 INJECTION INTRAVENOUS at 20:35

## 2021-11-23 RX ADMIN — ASPRIN AND EXTENDED-RELEASE DIPYRIDAMOLE 1 CAPSULE: 25; 200 CAPSULE ORAL at 20:34

## 2021-11-23 RX ADMIN — ASPRIN AND EXTENDED-RELEASE DIPYRIDAMOLE 1 CAPSULE: 25; 200 CAPSULE ORAL at 13:11

## 2021-11-23 RX ADMIN — ROSUVASTATIN CALCIUM 40 MG: 20 TABLET, FILM COATED ORAL at 09:29

## 2021-11-23 RX ADMIN — ENOXAPARIN SODIUM 40 MG: 40 INJECTION SUBCUTANEOUS at 17:48

## 2021-11-23 RX ADMIN — PANTOPRAZOLE SODIUM 40 MG: 40 TABLET, DELAYED RELEASE ORAL at 05:48

## 2021-11-23 RX ADMIN — GUAIFENESIN 1200 MG: 600 TABLET, EXTENDED RELEASE ORAL at 09:27

## 2021-11-23 RX ADMIN — DOXYCYCLINE 100 MG: 100 INJECTION, POWDER, LYOPHILIZED, FOR SOLUTION INTRAVENOUS at 17:48

## 2021-11-23 RX ADMIN — NICOTINE 1 PATCH: 14 PATCH TRANSDERMAL at 09:28

## 2021-11-23 RX ADMIN — DOCUSATE SODIUM 50 MG AND SENNOSIDES 8.6 MG 2 TABLET: 8.6; 5 TABLET, FILM COATED ORAL at 09:27

## 2021-11-23 RX ADMIN — DOCUSATE SODIUM 50 MG AND SENNOSIDES 8.6 MG 2 TABLET: 8.6; 5 TABLET, FILM COATED ORAL at 20:34

## 2021-11-23 RX ADMIN — PIPERACILLIN SODIUM AND TAZOBACTAM SODIUM 3.38 G: 3; .375 INJECTION, POWDER, LYOPHILIZED, FOR SOLUTION INTRAVENOUS at 17:25

## 2021-11-23 RX ADMIN — GUAIFENESIN 1200 MG: 600 TABLET, EXTENDED RELEASE ORAL at 20:34

## 2021-11-23 RX ADMIN — METHYLPREDNISOLONE SODIUM SUCCINATE 40 MG: 40 INJECTION, POWDER, FOR SOLUTION INTRAMUSCULAR; INTRAVENOUS at 09:29

## 2021-11-23 RX ADMIN — FOLIC ACID 500 MCG: 1 TABLET ORAL at 09:27

## 2021-11-24 LAB
ALBUMIN SERPL-MCNC: 3.17 G/DL (ref 3.5–5.2)
ALBUMIN/GLOB SERPL: 1.3 G/DL
ALP SERPL-CCNC: 52 U/L (ref 39–117)
ALT SERPL W P-5'-P-CCNC: 18 U/L (ref 1–33)
ANION GAP SERPL CALCULATED.3IONS-SCNC: 10.3 MMOL/L (ref 5–15)
AST SERPL-CCNC: 23 U/L (ref 1–32)
BASOPHILS # BLD AUTO: 0 10*3/MM3 (ref 0–0.2)
BASOPHILS NFR BLD AUTO: 0 % (ref 0–1.5)
BILIRUB SERPL-MCNC: 0.4 MG/DL (ref 0–1.2)
BUN SERPL-MCNC: 20 MG/DL (ref 8–23)
BUN/CREAT SERPL: 40.8 (ref 7–25)
CALCIUM SPEC-SCNC: 8.1 MG/DL (ref 8.6–10.5)
CHLORIDE SERPL-SCNC: 107 MMOL/L (ref 98–107)
CO2 SERPL-SCNC: 22.7 MMOL/L (ref 22–29)
CREAT SERPL-MCNC: 0.49 MG/DL (ref 0.57–1)
DEPRECATED RDW RBC AUTO: 49.6 FL (ref 37–54)
EOSINOPHIL # BLD AUTO: 0 10*3/MM3 (ref 0–0.4)
EOSINOPHIL NFR BLD AUTO: 0 % (ref 0.3–6.2)
ERYTHROCYTE [DISTWIDTH] IN BLOOD BY AUTOMATED COUNT: 13.1 % (ref 12.3–15.4)
GALACTOMANNAN AG SPEC IA-ACNC: 0.03 INDEX (ref 0–0.49)
GFR SERPL CREATININE-BSD FRML MDRD: 126 ML/MIN/1.73
GLOBULIN UR ELPH-MCNC: 2.5 GM/DL
GLUCOSE SERPL-MCNC: 86 MG/DL (ref 65–99)
HCT VFR BLD AUTO: 34.6 % (ref 34–46.6)
HGB BLD-MCNC: 11.3 G/DL (ref 12–15.9)
IMM GRANULOCYTES # BLD AUTO: 0.01 10*3/MM3 (ref 0–0.05)
IMM GRANULOCYTES NFR BLD AUTO: 0.2 % (ref 0–0.5)
LYMPHOCYTES # BLD AUTO: 0.89 10*3/MM3 (ref 0.7–3.1)
LYMPHOCYTES NFR BLD AUTO: 20.6 % (ref 19.6–45.3)
MCH RBC QN AUTO: 34 PG (ref 26.6–33)
MCHC RBC AUTO-ENTMCNC: 32.7 G/DL (ref 31.5–35.7)
MCV RBC AUTO: 104.2 FL (ref 79–97)
MONOCYTES # BLD AUTO: 0.37 10*3/MM3 (ref 0.1–0.9)
MONOCYTES NFR BLD AUTO: 8.6 % (ref 5–12)
NEUTROPHILS NFR BLD AUTO: 3.05 10*3/MM3 (ref 1.7–7)
NEUTROPHILS NFR BLD AUTO: 70.6 % (ref 42.7–76)
NRBC BLD AUTO-RTO: 0 /100 WBC (ref 0–0.2)
PLATELET # BLD AUTO: 121 10*3/MM3 (ref 140–450)
PMV BLD AUTO: 10.7 FL (ref 6–12)
POTASSIUM SERPL-SCNC: 3.4 MMOL/L (ref 3.5–5.2)
PROT SERPL-MCNC: 5.7 G/DL (ref 6–8.5)
RBC # BLD AUTO: 3.32 10*6/MM3 (ref 3.77–5.28)
SODIUM SERPL-SCNC: 140 MMOL/L (ref 136–145)
WBC NRBC COR # BLD: 4.32 10*3/MM3 (ref 3.4–10.8)

## 2021-11-24 PROCEDURE — 25010000002 PIPERACILLIN SOD-TAZOBACTAM PER 1 G: Performed by: STUDENT IN AN ORGANIZED HEALTH CARE EDUCATION/TRAINING PROGRAM

## 2021-11-24 PROCEDURE — 80053 COMPREHEN METABOLIC PANEL: CPT | Performed by: STUDENT IN AN ORGANIZED HEALTH CARE EDUCATION/TRAINING PROGRAM

## 2021-11-24 PROCEDURE — 94799 UNLISTED PULMONARY SVC/PX: CPT

## 2021-11-24 PROCEDURE — 99232 SBSQ HOSP IP/OBS MODERATE 35: CPT | Performed by: INTERNAL MEDICINE

## 2021-11-24 PROCEDURE — 25010000002 METHYLPREDNISOLONE PER 40 MG: Performed by: STUDENT IN AN ORGANIZED HEALTH CARE EDUCATION/TRAINING PROGRAM

## 2021-11-24 PROCEDURE — 25010000002 CEFTRIAXONE PER 250 MG: Performed by: INTERNAL MEDICINE

## 2021-11-24 PROCEDURE — 25010000002 ENOXAPARIN PER 10 MG: Performed by: STUDENT IN AN ORGANIZED HEALTH CARE EDUCATION/TRAINING PROGRAM

## 2021-11-24 PROCEDURE — 25010000002 PROCHLORPERAZINE 10 MG/2ML SOLUTION: Performed by: INTERNAL MEDICINE

## 2021-11-24 PROCEDURE — 85025 COMPLETE CBC W/AUTO DIFF WBC: CPT | Performed by: STUDENT IN AN ORGANIZED HEALTH CARE EDUCATION/TRAINING PROGRAM

## 2021-11-24 RX ORDER — POTASSIUM CHLORIDE 20 MEQ/1
40 TABLET, EXTENDED RELEASE ORAL EVERY 4 HOURS
Status: DISPENSED | OUTPATIENT
Start: 2021-11-24 | End: 2021-11-25

## 2021-11-24 RX ADMIN — PROCHLORPERAZINE EDISYLATE 2.5 MG: 5 INJECTION INTRAMUSCULAR; INTRAVENOUS at 19:37

## 2021-11-24 RX ADMIN — ASPRIN AND EXTENDED-RELEASE DIPYRIDAMOLE 1 CAPSULE: 25; 200 CAPSULE ORAL at 20:55

## 2021-11-24 RX ADMIN — POTASSIUM CHLORIDE 40 MEQ: 20 TABLET, EXTENDED RELEASE ORAL at 19:34

## 2021-11-24 RX ADMIN — BUDESONIDE AND FORMOTEROL FUMARATE DIHYDRATE 2 PUFF: 160; 4.5 AEROSOL RESPIRATORY (INHALATION) at 06:40

## 2021-11-24 RX ADMIN — METHYLPREDNISOLONE SODIUM SUCCINATE 40 MG: 40 INJECTION, POWDER, FOR SOLUTION INTRAMUSCULAR; INTRAVENOUS at 08:01

## 2021-11-24 RX ADMIN — IPRATROPIUM BROMIDE AND ALBUTEROL SULFATE 3 ML: .5; 2.5 SOLUTION RESPIRATORY (INHALATION) at 06:40

## 2021-11-24 RX ADMIN — SODIUM CHLORIDE, PRESERVATIVE FREE 10 ML: 5 INJECTION INTRAVENOUS at 08:03

## 2021-11-24 RX ADMIN — DOXYCYCLINE 100 MG: 100 INJECTION, POWDER, LYOPHILIZED, FOR SOLUTION INTRAVENOUS at 17:18

## 2021-11-24 RX ADMIN — NICOTINE 1 PATCH: 14 PATCH TRANSDERMAL at 08:05

## 2021-11-24 RX ADMIN — PANTOPRAZOLE SODIUM 40 MG: 40 TABLET, DELAYED RELEASE ORAL at 06:08

## 2021-11-24 RX ADMIN — ASPRIN AND EXTENDED-RELEASE DIPYRIDAMOLE 1 CAPSULE: 25; 200 CAPSULE ORAL at 08:02

## 2021-11-24 RX ADMIN — ENOXAPARIN SODIUM 40 MG: 40 INJECTION SUBCUTANEOUS at 17:23

## 2021-11-24 RX ADMIN — PENTOXIFYLLINE 400 MG: 400 TABLET, EXTENDED RELEASE ORAL at 11:54

## 2021-11-24 RX ADMIN — DOXYCYCLINE 100 MG: 100 INJECTION, POWDER, LYOPHILIZED, FOR SOLUTION INTRAVENOUS at 06:08

## 2021-11-24 RX ADMIN — CEFTRIAXONE 1 G: 1 INJECTION, POWDER, FOR SOLUTION INTRAMUSCULAR; INTRAVENOUS at 09:23

## 2021-11-24 RX ADMIN — PIPERACILLIN SODIUM AND TAZOBACTAM SODIUM 3.38 G: 3; .375 INJECTION, POWDER, LYOPHILIZED, FOR SOLUTION INTRAVENOUS at 00:53

## 2021-11-24 RX ADMIN — PENTOXIFYLLINE 400 MG: 400 TABLET, EXTENDED RELEASE ORAL at 08:02

## 2021-11-25 ENCOUNTER — READMISSION MANAGEMENT (OUTPATIENT)
Dept: CALL CENTER | Facility: HOSPITAL | Age: 66
End: 2021-11-25

## 2021-11-25 VITALS
HEIGHT: 61 IN | OXYGEN SATURATION: 94 % | RESPIRATION RATE: 18 BRPM | WEIGHT: 72.6 LBS | DIASTOLIC BLOOD PRESSURE: 82 MMHG | BODY MASS INDEX: 13.71 KG/M2 | HEART RATE: 74 BPM | SYSTOLIC BLOOD PRESSURE: 162 MMHG | TEMPERATURE: 98.3 F

## 2021-11-25 LAB
ALBUMIN SERPL-MCNC: 3.27 G/DL (ref 3.5–5.2)
ALBUMIN/GLOB SERPL: 1.3 G/DL
ALP SERPL-CCNC: 49 U/L (ref 39–117)
ALT SERPL W P-5'-P-CCNC: 19 U/L (ref 1–33)
ANION GAP SERPL CALCULATED.3IONS-SCNC: 9.8 MMOL/L (ref 5–15)
AST SERPL-CCNC: 25 U/L (ref 1–32)
BASOPHILS # BLD AUTO: 0 10*3/MM3 (ref 0–0.2)
BASOPHILS NFR BLD AUTO: 0 % (ref 0–1.5)
BILIRUB SERPL-MCNC: 0.4 MG/DL (ref 0–1.2)
BUN SERPL-MCNC: 20 MG/DL (ref 8–23)
BUN/CREAT SERPL: 45.5 (ref 7–25)
CALCIUM SPEC-SCNC: 8.2 MG/DL (ref 8.6–10.5)
CHLORIDE SERPL-SCNC: 110 MMOL/L (ref 98–107)
CO2 SERPL-SCNC: 23.2 MMOL/L (ref 22–29)
CREAT SERPL-MCNC: 0.44 MG/DL (ref 0.57–1)
DEPRECATED RDW RBC AUTO: 50.6 FL (ref 37–54)
EOSINOPHIL # BLD AUTO: 0 10*3/MM3 (ref 0–0.4)
EOSINOPHIL NFR BLD AUTO: 0 % (ref 0.3–6.2)
ERYTHROCYTE [DISTWIDTH] IN BLOOD BY AUTOMATED COUNT: 14 % (ref 12.3–15.4)
GFR SERPL CREATININE-BSD FRML MDRD: 143 ML/MIN/1.73
GLOBULIN UR ELPH-MCNC: 2.4 GM/DL
GLUCOSE SERPL-MCNC: 84 MG/DL (ref 65–99)
HCT VFR BLD AUTO: 33.8 % (ref 34–46.6)
HGB BLD-MCNC: 11.4 G/DL (ref 12–15.9)
IMM GRANULOCYTES # BLD AUTO: 0.01 10*3/MM3 (ref 0–0.05)
IMM GRANULOCYTES NFR BLD AUTO: 0.3 % (ref 0–0.5)
LYMPHOCYTES # BLD AUTO: 0.99 10*3/MM3 (ref 0.7–3.1)
LYMPHOCYTES NFR BLD AUTO: 28.9 % (ref 19.6–45.3)
MCH RBC QN AUTO: 36.9 PG (ref 26.6–33)
MCHC RBC AUTO-ENTMCNC: 33.7 G/DL (ref 31.5–35.7)
MCV RBC AUTO: 109.4 FL (ref 79–97)
MONOCYTES # BLD AUTO: 0.31 10*3/MM3 (ref 0.1–0.9)
MONOCYTES NFR BLD AUTO: 9.1 % (ref 5–12)
NEUTROPHILS NFR BLD AUTO: 2.11 10*3/MM3 (ref 1.7–7)
NEUTROPHILS NFR BLD AUTO: 61.7 % (ref 42.7–76)
NRBC BLD AUTO-RTO: 0 /100 WBC (ref 0–0.2)
PLATELET # BLD AUTO: 124 10*3/MM3 (ref 140–450)
PMV BLD AUTO: 11.2 FL (ref 6–12)
POTASSIUM SERPL-SCNC: 3.9 MMOL/L (ref 3.5–5.2)
POTASSIUM SERPL-SCNC: 3.9 MMOL/L (ref 3.5–5.2)
PROT SERPL-MCNC: 5.7 G/DL (ref 6–8.5)
RBC # BLD AUTO: 3.09 10*6/MM3 (ref 3.77–5.28)
SODIUM SERPL-SCNC: 143 MMOL/L (ref 136–145)
WBC NRBC COR # BLD: 3.42 10*3/MM3 (ref 3.4–10.8)

## 2021-11-25 PROCEDURE — 94799 UNLISTED PULMONARY SVC/PX: CPT

## 2021-11-25 PROCEDURE — 84132 ASSAY OF SERUM POTASSIUM: CPT | Performed by: INTERNAL MEDICINE

## 2021-11-25 PROCEDURE — 85025 COMPLETE CBC W/AUTO DIFF WBC: CPT | Performed by: STUDENT IN AN ORGANIZED HEALTH CARE EDUCATION/TRAINING PROGRAM

## 2021-11-25 PROCEDURE — 25010000002 METHYLPREDNISOLONE PER 40 MG: Performed by: STUDENT IN AN ORGANIZED HEALTH CARE EDUCATION/TRAINING PROGRAM

## 2021-11-25 PROCEDURE — 80053 COMPREHEN METABOLIC PANEL: CPT | Performed by: STUDENT IN AN ORGANIZED HEALTH CARE EDUCATION/TRAINING PROGRAM

## 2021-11-25 PROCEDURE — 99239 HOSP IP/OBS DSCHRG MGMT >30: CPT | Performed by: INTERNAL MEDICINE

## 2021-11-25 RX ORDER — DOXYCYCLINE HYCLATE 100 MG/1
100 CAPSULE ORAL 2 TIMES DAILY
Qty: 8 CAPSULE | Refills: 0 | Status: SHIPPED | OUTPATIENT
Start: 2021-11-25 | End: 2021-11-29

## 2021-11-25 RX ORDER — NICOTINE 21 MG/24HR
1 PATCH, TRANSDERMAL 24 HOURS TRANSDERMAL
Qty: 14 PATCH | Refills: 0 | Status: SHIPPED | OUTPATIENT
Start: 2021-11-26 | End: 2021-12-10

## 2021-11-25 RX ORDER — PREDNISONE 20 MG/1
40 TABLET ORAL DAILY
Qty: 10 TABLET | Refills: 0 | Status: ON HOLD | OUTPATIENT
Start: 2021-11-25 | End: 2022-12-21

## 2021-11-25 RX ORDER — IPRATROPIUM BROMIDE AND ALBUTEROL SULFATE 2.5; .5 MG/3ML; MG/3ML
SOLUTION RESPIRATORY (INHALATION)
Qty: 18 ML | Refills: 0 | Status: ON HOLD | OUTPATIENT
Start: 2021-11-25 | End: 2022-12-21

## 2021-11-25 RX ORDER — BUDESONIDE AND FORMOTEROL FUMARATE DIHYDRATE 160; 4.5 UG/1; UG/1
2 AEROSOL RESPIRATORY (INHALATION)
Qty: 10.2 G | Refills: 0 | Status: ON HOLD | OUTPATIENT
Start: 2021-11-25 | End: 2022-12-21

## 2021-11-25 RX ORDER — DOXYCYCLINE 100 MG/1
CAPSULE ORAL
Qty: 14 CAPSULE | Refills: 0 | Status: ON HOLD | OUTPATIENT
Start: 2021-11-25 | End: 2022-12-21

## 2021-11-25 RX ORDER — CEFDINIR 300 MG/1
300 CAPSULE ORAL 2 TIMES DAILY
Qty: 8 CAPSULE | Refills: 0 | Status: SHIPPED | OUTPATIENT
Start: 2021-11-25 | End: 2021-11-29

## 2021-11-25 RX ADMIN — FOLIC ACID 500 MCG: 1 TABLET ORAL at 08:36

## 2021-11-25 RX ADMIN — NICOTINE 1 PATCH: 14 PATCH TRANSDERMAL at 08:37

## 2021-11-25 RX ADMIN — PENTOXIFYLLINE 400 MG: 400 TABLET, EXTENDED RELEASE ORAL at 08:36

## 2021-11-25 RX ADMIN — ROSUVASTATIN CALCIUM 40 MG: 20 TABLET, FILM COATED ORAL at 08:36

## 2021-11-25 RX ADMIN — ASPRIN AND EXTENDED-RELEASE DIPYRIDAMOLE 1 CAPSULE: 25; 200 CAPSULE ORAL at 08:36

## 2021-11-25 RX ADMIN — METHYLPREDNISOLONE SODIUM SUCCINATE 40 MG: 40 INJECTION, POWDER, FOR SOLUTION INTRAMUSCULAR; INTRAVENOUS at 08:36

## 2021-11-25 RX ADMIN — SODIUM CHLORIDE, PRESERVATIVE FREE 10 ML: 5 INJECTION INTRAVENOUS at 08:37

## 2021-11-25 RX ADMIN — BUDESONIDE AND FORMOTEROL FUMARATE DIHYDRATE 2 PUFF: 160; 4.5 AEROSOL RESPIRATORY (INHALATION) at 06:44

## 2021-11-25 RX ADMIN — DOXYCYCLINE 100 MG: 100 INJECTION, POWDER, LYOPHILIZED, FOR SOLUTION INTRAVENOUS at 04:25

## 2021-11-26 LAB
BACTERIA SPEC AEROBE CULT: NORMAL
BACTERIA SPEC AEROBE CULT: NORMAL

## 2021-11-26 NOTE — OUTREACH NOTE
Prep Survey      Responses   [facility] facility patient discharged from? [place]   Is LACE score < 7 ? No   Emergency Room discharge w/ pulse ox? No   Eligibility Readm Mgmt   Discharge diagnosis CAP   Does the patient have one of the following disease processes/diagnoses(primary or secondary)? Sepsis   Does the patient have Home health ordered? No   Is there a DME ordered? No   Comments regarding appointments f/u appt needed   Medication alerts for this patient see AVS   General alerts for this patient PNA   Prep survey completed? Yes          Regi Garrett RN

## 2021-11-29 ENCOUNTER — TELEPHONE (OUTPATIENT)
Dept: TELEMETRY | Facility: HOSPITAL | Age: 66
End: 2021-11-29

## 2021-11-30 ENCOUNTER — READMISSION MANAGEMENT (OUTPATIENT)
Dept: CALL CENTER | Facility: HOSPITAL | Age: 66
End: 2021-11-30

## 2021-11-30 NOTE — OUTREACH NOTE
Sepsis Week 1 Survey      Responses   [facility] facility patient discharged from? [place]   Does the patient have one of the following disease processes/diagnoses(primary or secondary)? Sepsis   Week 1 attempt successful? No  [No answer all numbers]   Unsuccessful attempts Attempt 1   Discharge diagnosis JACI Garcia RN

## 2021-12-06 ENCOUNTER — READMISSION MANAGEMENT (OUTPATIENT)
Dept: CALL CENTER | Facility: HOSPITAL | Age: 66
End: 2021-12-06

## 2021-12-06 NOTE — OUTREACH NOTE
Sepsis Week 1 Survey      Responses   [facility] patient discharged from? [place]   Does the patient have one of the following disease processes/diagnoses(primary or secondary)? Sepsis   Week 1 attempt successful? Yes   Call start time 1109   Call end time 1110   Discharge diagnosis CAP   Meds reviewed with patient/caregiver? Yes   Is the patient having any side effects they believe may be caused by any medication additions or changes? No   Does the patient have all medications related to this admission filled (includes all antibiotics, inhalers, nebulizers,steroids,etc.) Yes   Is the patient taking all medications as directed (includes completed medication regime)? Yes   Does the patient have a primary care provider?  Yes   Comments regarding PCP 12/6/21   Does the patient have an appointment with their PCP within 7 days of discharge? Greater than 7 days   What is preventing the patient from scheduling follow up appointments within 7 days of discharge? --  [unsure]   Nursing Interventions Verified appointment date/time/provider   Has the patient kept scheduled appointments due by today? Yes   Psychosocial issues? No   Did the patient receive a copy of their discharge instructions? Yes   Nursing interventions Reviewed instructions with patient   What is the patient's perception of their health status since discharge? Improving   Nursing interventions Nurse provided patient education   Is the patient/caregiver able to teach back Sepsis? S - Shivering,fever or very cold,  S - Sleepy, difficult to arouse,confused,  S - Short of breath   Nursing interventions Nurse provided patient education   Is patient/caregiver able to teach back steps to recovery at home? Set small, achievable goals for return to baseline health,  Rest and regain strength,  Eat a balanced diet   Is the patient/caregiver able to teach back signs and symptoms of worsening condition: Fever,  Hyperthermia,  Shortness of breath/rapid respiratory  rate,  Altered mental status(confusion/coma)   If the patient is a current smoker, are they able to teach back resources for cessation? [id number]   Is the patient/caregiver able to teach back the hierarchy of who to call/visit for symptoms/problems? PCP, Specialist, Home health nurse, Urgent Care, ED, 911 Yes   Week 1 call completed? Yes          Linda Stratton RN

## 2021-12-15 ENCOUNTER — READMISSION MANAGEMENT (OUTPATIENT)
Dept: CALL CENTER | Facility: HOSPITAL | Age: 66
End: 2021-12-15

## 2021-12-15 NOTE — OUTREACH NOTE
Sepsis Week 2 Survey      Responses   [facility] patient discharged from? [facility]   Does the patient have one of the following disease processes/diagnoses(primary or secondary)? Sepsis   Week 2 attempt successful? Yes   Call start time 1228   Call end time 1230   Discharge diagnosis CAP   Meds reviewed with patient/caregiver? Yes   Is the patient having any side effects they believe may be caused by any medication additions or changes? No   Does the patient have all medications related to this admission filled (includes all antibiotics, inhalers, nebulizers,steroids,etc.) Yes   Is the patient taking all medications as directed (includes completed medication regime)? Yes   Medication comments Has finished antibiotic   Does the patient have a primary care provider?  Yes   Comments regarding PCP 12/6/21   Does the patient have an appointment with their PCP within 7 days of discharge? Yes   Has the patient kept scheduled appointments due by today? Yes   Has home health visited the patient within 72 hours of discharge? N/A   Psychosocial issues? No   Did the patient receive a copy of their discharge instructions? Yes   Nursing interventions Reviewed instructions with patient   What is the patient's perception of their health status since discharge? Same  [Productive cough]   Nursing interventions Nurse provided patient education   Is the patient/caregiver able to teach back Sepsis? S - Short of breath,  E - Extreme pain or generalized discomfort (worst ever,especially abdomen),  S - Shivering,fever or very cold   Nursing interventions Nurse provided patient education   Is patient/caregiver able to teach back steps to recovery at home? Set small, achievable goals for return to baseline health,  Rest and regain strength   Is the patient/caregiver able to teach back signs and symptoms of worsening condition: Fever,  Rapid heart rate (>90),  Shortness of breath/rapid respiratory rate   Is the patient/caregiver able to  teach back the hierarchy of who to call/visit for symptoms/problems? PCP, Specialist, Home health nurse, Urgent Care, ED, 911 Yes   Week 2 call completed? Yes          Stephanie Macdonald RN

## 2021-12-21 ENCOUNTER — READMISSION MANAGEMENT (OUTPATIENT)
Dept: CALL CENTER | Facility: HOSPITAL | Age: 66
End: 2021-12-21

## 2021-12-21 NOTE — OUTREACH NOTE
Sepsis Week 3 Survey      Responses   [facility] patient discharged from? [facility]   Does the patient have one of the following disease processes/diagnoses(primary or secondary)? Sepsis   Week 3 attempt successful? Yes   Call start time 1631   Call end time 1633   General alerts for this patient PNA   Discharge diagnosis CAP   Meds reviewed with patient/caregiver? Yes   Is the patient having any side effects they believe may be caused by any medication additions or changes? No   Does the patient have all medications related to this admission filled (includes all antibiotics, inhalers, nebulizers,steroids,etc.) Yes   Is the patient taking all medications as directed (includes completed medication regime)? Yes   Does the patient have a primary care provider?  Yes   Does the patient have an appointment with their PCP within 7 days of discharge? Yes   Has the patient kept scheduled appointments due by today? Yes   Has home health visited the patient within 72 hours of discharge? N/A   Psychosocial issues? No   Did the patient receive a copy of their discharge instructions? Yes   Nursing interventions Reviewed instructions with patient   What is the patient's perception of their health status since discharge? Same   Nursing interventions Nurse provided patient education   Is the patient/caregiver able to teach back Sepsis? S - Shivering,fever or very cold,  S - Short of breath   Nursing interventions Nurse provided reassurance to patient   Is patient/caregiver able to teach back steps to recovery at home? Set small, achievable goals for return to baseline health,  Rest and regain strength,  Eat a balanced diet,  Exercise as tolerated,  Make a list of questions for PCP appoinment   Is the patient/caregiver able to teach back signs and symptoms of worsening condition: Fever,  Edema,  Shortness of breath/rapid respiratory rate   If the patient is a current smoker, are they able to teach back resources for cessation? Not  a smoker   Is the patient/caregiver able to teach back the hierarchy of who to call/visit for symptoms/problems? PCP, Specialist, Home health nurse, Urgent Care, ED, 911 Yes   Additional teach back comments Says she is feeling better, she says she is doing ok. No fever, SOA all the time.   Week 3 call completed? Yes   Wrap up additional comments Improving.          Yanely Pedraza RN

## 2021-12-23 ENCOUNTER — HOSPITAL ENCOUNTER (OUTPATIENT)
Dept: CT IMAGING | Facility: HOSPITAL | Age: 66
Discharge: HOME | End: 2021-12-23
Admitting: NURSE PRACTITIONER

## 2021-12-23 DIAGNOSIS — F17.210 CIGARETTE SMOKER: ICD-10-CM

## 2021-12-23 PROCEDURE — 71271 CT THORAX LUNG CANCER SCR C-: CPT | Performed by: RADIOLOGY

## 2021-12-23 PROCEDURE — 71271 CT THORAX LUNG CANCER SCR C-: CPT

## 2022-01-04 ENCOUNTER — NURSE NAVIGATOR (OUTPATIENT)
Dept: ONCOLOGY | Facility: HOSPITAL | Age: 67
End: 2022-01-04

## 2022-01-04 NOTE — PROGRESS NOTES
Nurse Navigator phoned the office of CARMELO Tamayo ([phone]) regarding patient's LDCT chest scan result with a Lung Rads score 3.  Spoke with Germania in the office, and informed her that pt qualifies to be referred to pulmonology/lung nodule clinic.  Information given for referral to be completed by practitioner and NN contact information given.

## 2022-05-13 ENCOUNTER — TRANSCRIBE ORDERS (OUTPATIENT)
Dept: ADMINISTRATIVE | Facility: HOSPITAL | Age: 67
End: 2022-05-13

## 2022-05-13 DIAGNOSIS — Z86.73 HISTORY OF STROKE: Primary | ICD-10-CM

## 2022-06-13 ENCOUNTER — HOSPITAL ENCOUNTER (OUTPATIENT)
Dept: CARDIOLOGY | Facility: HOSPITAL | Age: 67
Discharge: HOME | End: 2022-06-13
Admitting: PSYCHIATRY & NEUROLOGY

## 2022-06-13 DIAGNOSIS — Z86.73 HISTORY OF STROKE: ICD-10-CM

## 2022-06-13 PROCEDURE — 93880 EXTRACRANIAL BILAT STUDY: CPT

## 2022-06-13 PROCEDURE — 93880 EXTRACRANIAL BILAT STUDY: CPT | Performed by: RADIOLOGY

## 2022-12-21 ENCOUNTER — HOSPITAL ENCOUNTER (INPATIENT)
Facility: HOSPITAL | Age: 67
LOS: 3 days | Discharge: HOME | DRG: 871 | End: 2022-12-24
Attending: EMERGENCY MEDICINE | Admitting: HOSPITALIST
Payer: MEDICARE

## 2022-12-21 ENCOUNTER — APPOINTMENT (OUTPATIENT)
Dept: CT IMAGING | Facility: HOSPITAL | Age: 67
DRG: 871 | End: 2022-12-21
Payer: MEDICARE

## 2022-12-21 ENCOUNTER — APPOINTMENT (OUTPATIENT)
Dept: GENERAL RADIOLOGY | Facility: HOSPITAL | Age: 67
DRG: 871 | End: 2022-12-21
Payer: MEDICARE

## 2022-12-21 DIAGNOSIS — J10.1 INFLUENZA A: Primary | ICD-10-CM

## 2022-12-21 LAB
A-A DO2: 54.7 MMHG (ref 0–300)
ALBUMIN SERPL-MCNC: 3.75 G/DL (ref 3.5–5.2)
ALBUMIN/GLOB SERPL: 1.2 G/DL
ALP SERPL-CCNC: 65 U/L (ref 39–117)
ALT SERPL W P-5'-P-CCNC: 23 U/L (ref 1–33)
ANION GAP SERPL CALCULATED.3IONS-SCNC: 14.9 MMOL/L (ref 5–15)
ARTERIAL PATENCY WRIST A: POSITIVE
AST SERPL-CCNC: 36 U/L (ref 1–32)
ATMOSPHERIC PRESS: 731 MMHG
BACTERIA UR QL AUTO: ABNORMAL /HPF
BASE EXCESS BLDA CALC-SCNC: 0.2 MMOL/L (ref 0–2)
BASOPHILS # BLD AUTO: 0.01 10*3/MM3 (ref 0–0.2)
BASOPHILS NFR BLD AUTO: 0.3 % (ref 0–1.5)
BDY SITE: ABNORMAL
BILIRUB SERPL-MCNC: 0.6 MG/DL (ref 0–1.2)
BILIRUB UR QL STRIP: NEGATIVE
BODY TEMPERATURE: 0 C
BUN SERPL-MCNC: 19 MG/DL (ref 8–23)
BUN/CREAT SERPL: 28.8 (ref 7–25)
CALCIUM SPEC-SCNC: 8.6 MG/DL (ref 8.6–10.5)
CHLORIDE SERPL-SCNC: 102 MMOL/L (ref 98–107)
CLARITY UR: CLEAR
CO2 BLDA-SCNC: 26.3 MMOL/L (ref 22–33)
CO2 SERPL-SCNC: 20.1 MMOL/L (ref 22–29)
COHGB MFR BLD: 1.5 % (ref 0–5)
COLOR UR: YELLOW
CREAT SERPL-MCNC: 0.66 MG/DL (ref 0.57–1)
CRP SERPL-MCNC: 1.15 MG/DL (ref 0–0.5)
D-LACTATE SERPL-SCNC: 1.6 MMOL/L (ref 0.5–2)
DEPRECATED RDW RBC AUTO: 49.9 FL (ref 37–54)
EGFRCR SERPLBLD CKD-EPI 2021: 95.7 ML/MIN/1.73
EOSINOPHIL # BLD AUTO: 0 10*3/MM3 (ref 0–0.4)
EOSINOPHIL NFR BLD AUTO: 0 % (ref 0.3–6.2)
ERYTHROCYTE [DISTWIDTH] IN BLOOD BY AUTOMATED COUNT: 13.1 % (ref 12.3–15.4)
FLUAV RNA RESP QL NAA+PROBE: DETECTED
FLUBV RNA RESP QL NAA+PROBE: NOT DETECTED
GAS FLOW AIRWAY: 1 LPM
GLOBULIN UR ELPH-MCNC: 3.1 GM/DL
GLUCOSE SERPL-MCNC: 109 MG/DL (ref 65–99)
GLUCOSE UR STRIP-MCNC: NEGATIVE MG/DL
HCO3 BLDA-SCNC: 25 MMOL/L (ref 20–26)
HCT VFR BLD AUTO: 42.5 % (ref 34–46.6)
HCT VFR BLD CALC: 45.9 % (ref 38–51)
HGB BLD-MCNC: 14.5 G/DL (ref 12–15.9)
HGB BLDA-MCNC: 15 G/DL (ref 13.5–17.5)
HGB UR QL STRIP.AUTO: ABNORMAL
HOLD SPECIMEN: NORMAL
HOLD SPECIMEN: NORMAL
HYALINE CASTS UR QL AUTO: ABNORMAL /LPF
IMM GRANULOCYTES # BLD AUTO: 0.01 10*3/MM3 (ref 0–0.05)
IMM GRANULOCYTES NFR BLD AUTO: 0.3 % (ref 0–0.5)
INHALED O2 CONCENTRATION: 24 %
KETONES UR QL STRIP: ABNORMAL
LEUKOCYTE ESTERASE UR QL STRIP.AUTO: NEGATIVE
LYMPHOCYTES # BLD AUTO: 0.39 10*3/MM3 (ref 0.7–3.1)
LYMPHOCYTES NFR BLD AUTO: 11 % (ref 19.6–45.3)
Lab: ABNORMAL
MCH RBC QN AUTO: 35.5 PG (ref 26.6–33)
MCHC RBC AUTO-ENTMCNC: 34.1 G/DL (ref 31.5–35.7)
MCV RBC AUTO: 104.2 FL (ref 79–97)
METHGB BLD QL: 0.4 % (ref 0–3)
MODALITY: ABNORMAL
MONOCYTES # BLD AUTO: 0.2 10*3/MM3 (ref 0.1–0.9)
MONOCYTES NFR BLD AUTO: 5.6 % (ref 5–12)
NEUTROPHILS NFR BLD AUTO: 2.93 10*3/MM3 (ref 1.7–7)
NEUTROPHILS NFR BLD AUTO: 82.8 % (ref 42.7–76)
NITRITE UR QL STRIP: NEGATIVE
NOTE: ABNORMAL
NRBC BLD AUTO-RTO: 0 /100 WBC (ref 0–0.2)
NT-PROBNP SERPL-MCNC: 256.7 PG/ML (ref 0–900)
OXYHGB MFR BLDV: 91.5 % (ref 94–99)
PCO2 BLDA: 40.3 MM HG (ref 35–45)
PCO2 TEMP ADJ BLD: ABNORMAL MM[HG]
PH BLDA: 7.4 PH UNITS (ref 7.35–7.45)
PH UR STRIP.AUTO: 5.5 [PH] (ref 5–8)
PH, TEMP CORRECTED: ABNORMAL
PLATELET # BLD AUTO: 73 10*3/MM3 (ref 140–450)
PMV BLD AUTO: 10.6 FL (ref 6–12)
PO2 BLDA: 64.1 MM HG (ref 83–108)
PO2 TEMP ADJ BLD: ABNORMAL MM[HG]
POTASSIUM SERPL-SCNC: 3.9 MMOL/L (ref 3.5–5.2)
PROCALCITONIN SERPL-MCNC: 0.13 NG/ML (ref 0–0.25)
PROT SERPL-MCNC: 6.8 G/DL (ref 6–8.5)
PROT UR QL STRIP: ABNORMAL
RBC # BLD AUTO: 4.08 10*6/MM3 (ref 3.77–5.28)
RBC # UR STRIP: ABNORMAL /HPF
REF LAB TEST METHOD: ABNORMAL
SAO2 % BLDCOA: 93.3 % (ref 94–99)
SARS-COV-2 RNA RESP QL NAA+PROBE: NOT DETECTED
SODIUM SERPL-SCNC: 137 MMOL/L (ref 136–145)
SP GR UR STRIP: >1.03 (ref 1–1.03)
SQUAMOUS #/AREA URNS HPF: ABNORMAL /HPF
TROPONIN T SERPL-MCNC: <0.01 NG/ML (ref 0–0.03)
UROBILINOGEN UR QL STRIP: ABNORMAL
VENTILATOR MODE: ABNORMAL
WBC # UR STRIP: ABNORMAL /HPF
WBC NRBC COR # BLD: 3.54 10*3/MM3 (ref 3.4–10.8)
WHOLE BLOOD HOLD COAG: NORMAL

## 2022-12-21 PROCEDURE — 93010 ELECTROCARDIOGRAM REPORT: CPT | Performed by: INTERNAL MEDICINE

## 2022-12-21 PROCEDURE — 94799 UNLISTED PULMONARY SVC/PX: CPT

## 2022-12-21 PROCEDURE — 71045 X-RAY EXAM CHEST 1 VIEW: CPT

## 2022-12-21 PROCEDURE — 25010000002 METHYLPREDNISOLONE PER 125 MG: Performed by: PHYSICIAN ASSISTANT

## 2022-12-21 PROCEDURE — 93005 ELECTROCARDIOGRAM TRACING: CPT | Performed by: PHYSICIAN ASSISTANT

## 2022-12-21 PROCEDURE — 85025 COMPLETE CBC W/AUTO DIFF WBC: CPT | Performed by: PHYSICIAN ASSISTANT

## 2022-12-21 PROCEDURE — 71045 X-RAY EXAM CHEST 1 VIEW: CPT | Performed by: RADIOLOGY

## 2022-12-21 PROCEDURE — 82375 ASSAY CARBOXYHB QUANT: CPT

## 2022-12-21 PROCEDURE — 94640 AIRWAY INHALATION TREATMENT: CPT

## 2022-12-21 PROCEDURE — 99285 EMERGENCY DEPT VISIT HI MDM: CPT

## 2022-12-21 PROCEDURE — 87636 SARSCOV2 & INF A&B AMP PRB: CPT | Performed by: PHYSICIAN ASSISTANT

## 2022-12-21 PROCEDURE — 84145 PROCALCITONIN (PCT): CPT | Performed by: PHYSICIAN ASSISTANT

## 2022-12-21 PROCEDURE — 87040 BLOOD CULTURE FOR BACTERIA: CPT | Performed by: PHYSICIAN ASSISTANT

## 2022-12-21 PROCEDURE — 71275 CT ANGIOGRAPHY CHEST: CPT

## 2022-12-21 PROCEDURE — 80053 COMPREHEN METABOLIC PANEL: CPT | Performed by: PHYSICIAN ASSISTANT

## 2022-12-21 PROCEDURE — 83050 HGB METHEMOGLOBIN QUAN: CPT

## 2022-12-21 PROCEDURE — 82805 BLOOD GASES W/O2 SATURATION: CPT

## 2022-12-21 PROCEDURE — 81001 URINALYSIS AUTO W/SCOPE: CPT | Performed by: PHYSICIAN ASSISTANT

## 2022-12-21 PROCEDURE — 99223 1ST HOSP IP/OBS HIGH 75: CPT | Performed by: HOSPITALIST

## 2022-12-21 PROCEDURE — 83605 ASSAY OF LACTIC ACID: CPT | Performed by: PHYSICIAN ASSISTANT

## 2022-12-21 PROCEDURE — 0 IOPAMIDOL PER 1 ML: Performed by: EMERGENCY MEDICINE

## 2022-12-21 PROCEDURE — 36600 WITHDRAWAL OF ARTERIAL BLOOD: CPT

## 2022-12-21 PROCEDURE — 83880 ASSAY OF NATRIURETIC PEPTIDE: CPT | Performed by: PHYSICIAN ASSISTANT

## 2022-12-21 PROCEDURE — 84484 ASSAY OF TROPONIN QUANT: CPT | Performed by: PHYSICIAN ASSISTANT

## 2022-12-21 PROCEDURE — 86140 C-REACTIVE PROTEIN: CPT | Performed by: PHYSICIAN ASSISTANT

## 2022-12-21 RX ORDER — ACETAMINOPHEN 325 MG/1
650 TABLET ORAL EVERY 6 HOURS PRN
Status: DISCONTINUED | OUTPATIENT
Start: 2022-12-21 | End: 2022-12-24 | Stop reason: HOSPADM

## 2022-12-21 RX ORDER — ALBUTEROL SULFATE 2.5 MG/3ML
2.5 SOLUTION RESPIRATORY (INHALATION) EVERY 6 HOURS PRN
Status: CANCELLED | OUTPATIENT
Start: 2022-12-21

## 2022-12-21 RX ORDER — SODIUM CHLORIDE 0.9 % (FLUSH) 0.9 %
10 SYRINGE (ML) INJECTION AS NEEDED
Status: DISCONTINUED | OUTPATIENT
Start: 2022-12-21 | End: 2022-12-24 | Stop reason: HOSPADM

## 2022-12-21 RX ORDER — ROSUVASTATIN CALCIUM 20 MG/1
40 TABLET, COATED ORAL EVERY OTHER DAY
Status: CANCELLED | OUTPATIENT
Start: 2022-12-21

## 2022-12-21 RX ORDER — IPRATROPIUM BROMIDE AND ALBUTEROL SULFATE 2.5; .5 MG/3ML; MG/3ML
3 SOLUTION RESPIRATORY (INHALATION)
Status: DISCONTINUED | OUTPATIENT
Start: 2022-12-21 | End: 2022-12-24 | Stop reason: HOSPADM

## 2022-12-21 RX ORDER — HEPARIN SODIUM 5000 [USP'U]/ML
5000 INJECTION, SOLUTION INTRAVENOUS; SUBCUTANEOUS EVERY 12 HOURS SCHEDULED
Status: DISCONTINUED | OUTPATIENT
Start: 2022-12-21 | End: 2022-12-21

## 2022-12-21 RX ORDER — SODIUM CHLORIDE 9 MG/ML
40 INJECTION, SOLUTION INTRAVENOUS AS NEEDED
Status: DISCONTINUED | OUTPATIENT
Start: 2022-12-21 | End: 2022-12-24 | Stop reason: HOSPADM

## 2022-12-21 RX ORDER — BUDESONIDE AND FORMOTEROL FUMARATE DIHYDRATE 160; 4.5 UG/1; UG/1
2 AEROSOL RESPIRATORY (INHALATION)
Status: DISCONTINUED | OUTPATIENT
Start: 2022-12-21 | End: 2022-12-24 | Stop reason: HOSPADM

## 2022-12-21 RX ORDER — SODIUM CHLORIDE 9 MG/ML
100 INJECTION, SOLUTION INTRAVENOUS CONTINUOUS
Status: DISCONTINUED | OUTPATIENT
Start: 2022-12-21 | End: 2022-12-22

## 2022-12-21 RX ORDER — METHYLPREDNISOLONE SODIUM SUCCINATE 125 MG/2ML
125 INJECTION, POWDER, LYOPHILIZED, FOR SOLUTION INTRAMUSCULAR; INTRAVENOUS ONCE
Status: COMPLETED | OUTPATIENT
Start: 2022-12-21 | End: 2022-12-21

## 2022-12-21 RX ORDER — OSELTAMIVIR PHOSPHATE 30 MG/1
30 CAPSULE ORAL EVERY 12 HOURS SCHEDULED
Status: DISCONTINUED | OUTPATIENT
Start: 2022-12-22 | End: 2022-12-22

## 2022-12-21 RX ORDER — IPRATROPIUM BROMIDE AND ALBUTEROL SULFATE 2.5; .5 MG/3ML; MG/3ML
3 SOLUTION RESPIRATORY (INHALATION) ONCE
Status: COMPLETED | OUTPATIENT
Start: 2022-12-21 | End: 2022-12-21

## 2022-12-21 RX ORDER — OSELTAMIVIR PHOSPHATE 75 MG/1
75 CAPSULE ORAL ONCE
Status: COMPLETED | OUTPATIENT
Start: 2022-12-21 | End: 2022-12-21

## 2022-12-21 RX ORDER — SODIUM CHLORIDE 0.9 % (FLUSH) 0.9 %
10 SYRINGE (ML) INJECTION EVERY 12 HOURS SCHEDULED
Status: DISCONTINUED | OUTPATIENT
Start: 2022-12-21 | End: 2022-12-24 | Stop reason: HOSPADM

## 2022-12-21 RX ORDER — METHYLPREDNISOLONE SODIUM SUCCINATE 40 MG/ML
40 INJECTION, POWDER, LYOPHILIZED, FOR SOLUTION INTRAMUSCULAR; INTRAVENOUS EVERY 12 HOURS
Status: DISCONTINUED | OUTPATIENT
Start: 2022-12-22 | End: 2022-12-24 | Stop reason: HOSPADM

## 2022-12-21 RX ADMIN — SODIUM CHLORIDE 1000 ML: 9 INJECTION, SOLUTION INTRAVENOUS at 16:13

## 2022-12-21 RX ADMIN — IPRATROPIUM BROMIDE AND ALBUTEROL SULFATE 3 ML: 2.5; .5 SOLUTION RESPIRATORY (INHALATION) at 18:10

## 2022-12-21 RX ADMIN — IPRATROPIUM BROMIDE AND ALBUTEROL SULFATE 3 ML: 2.5; .5 SOLUTION RESPIRATORY (INHALATION) at 20:52

## 2022-12-21 RX ADMIN — Medication 10 ML: at 22:33

## 2022-12-21 RX ADMIN — SODIUM CHLORIDE 100 ML/HR: 9 INJECTION, SOLUTION INTRAVENOUS at 21:33

## 2022-12-21 RX ADMIN — METHYLPREDNISOLONE SODIUM SUCCINATE 125 MG: 125 INJECTION, POWDER, FOR SOLUTION INTRAMUSCULAR; INTRAVENOUS at 16:14

## 2022-12-21 RX ADMIN — OSELTAMIVIR PHOSPHATE 75 MG: 75 CAPSULE ORAL at 21:33

## 2022-12-21 RX ADMIN — IOPAMIDOL 65 ML: 755 INJECTION, SOLUTION INTRAVENOUS at 19:38

## 2022-12-21 RX ADMIN — IPRATROPIUM BROMIDE AND ALBUTEROL SULFATE 3 ML: 2.5; .5 SOLUTION RESPIRATORY (INHALATION) at 16:11

## 2022-12-21 RX ADMIN — ACETAMINOPHEN 650 MG: 325 TABLET ORAL at 23:24

## 2022-12-21 NOTE — ED PROVIDER NOTES
Subjective   History of Present Illness  Patient is a 67-year-old female with COPD, hyperlipidemia, peripheral vascular disease, and history of strokes presents to the ED with complaints of cough, congestion, body aches, fever, chills, and worsening shortness of breath.  She states she wears a liter of oxygen daily.  She states she has been continuing to wear this and she feels having worsening shortness of breath over the last 2 days.  She denies associated chest pain, nausea, vomiting, headache, dizziness, abdominal pain, dysuria, diarrhea, or constipation.  She does smoke approximately half a pack a day.    History provided by:  Patient   used: No        Review of Systems   Constitutional: Positive for chills and fever. Negative for diaphoresis and fatigue.   HENT: Positive for congestion. Negative for ear discharge, ear pain, facial swelling, hearing loss, postnasal drip, rhinorrhea, sinus pressure, sinus pain, sneezing, sore throat, tinnitus and trouble swallowing.    Eyes: Negative.  Negative for photophobia, pain, discharge, redness and itching.   Respiratory: Positive for cough, shortness of breath and wheezing.    Cardiovascular: Negative.  Negative for chest pain.   Gastrointestinal: Negative.  Negative for abdominal distention, abdominal pain, constipation, diarrhea, nausea and vomiting.   Endocrine: Negative.    Genitourinary: Negative.  Negative for dysuria.   Musculoskeletal: Positive for myalgias. Negative for arthralgias, back pain, gait problem, joint swelling, neck pain and neck stiffness.   Skin: Negative.    Neurological: Negative.  Negative for dizziness, tremors, seizures, syncope, facial asymmetry, speech difficulty, weakness, light-headedness, numbness and headaches.   Psychiatric/Behavioral: Negative.  Negative for confusion.   All other systems reviewed and are negative.      Past Medical History:   Diagnosis Date   • COPD (chronic obstructive pulmonary disease) (HCC)     • Hyperlipidemia    • Peripheral vascular disease (HCC)    • Stroke (HCC)        Allergies   Allergen Reactions   • Codeine Swelling   • Latex Hives   • Pletal [Cilostazol] Rash     Welps       Past Surgical History:   Procedure Laterality Date   • CARDIAC CATHETERIZATION Left 4/6/2021    Procedure: Peripheral angiography;  Surgeon: Dae Callahan MD;  Location:  [facility];  Service: Cardiovascular;  Laterality: Left;       Family History   Problem Relation Age of Onset   • Heart disease Mother    • Hypertension Mother    • Diabetes Mother        Social History     Socioeconomic History   • Marital status:    • Number of children: 3   Tobacco Use   • Smoking status: Every Day     Packs/day: 0.25     Years: 50.00     Pack years: 12.50     Types: Cigarettes   • Smokeless tobacco: Never   Substance and Sexual Activity   • Alcohol use: No   • Drug use: No   • Sexual activity: Defer           Objective   Physical Exam  Vitals and nursing note reviewed.   Constitutional:       General: She is not in acute distress.     Appearance: She is well-developed. She is not diaphoretic.   HENT:      Head: Normocephalic and atraumatic.      Right Ear: External ear normal.      Left Ear: External ear normal.      Nose: Congestion present.      Mouth/Throat:      Mouth: Mucous membranes are moist.      Pharynx: Oropharynx is clear. Posterior oropharyngeal erythema present. No oropharyngeal exudate.   Eyes:      Extraocular Movements: Extraocular movements intact.      Conjunctiva/sclera: Conjunctivae normal.      Pupils: Pupils are equal, round, and reactive to light.   Neck:      Vascular: No JVD.      Trachea: No tracheal deviation.   Cardiovascular:      Rate and Rhythm: Normal rate and regular rhythm.      Pulses: Normal pulses.      Heart sounds: Normal heart sounds. No murmur heard.  Pulmonary:      Effort: Pulmonary effort is normal. Tachypnea present. No accessory muscle usage or  respiratory distress.      Breath sounds: No stridor. Wheezing present.   Abdominal:      General: Bowel sounds are normal. There is no distension.      Palpations: Abdomen is soft.      Tenderness: There is no abdominal tenderness. There is no guarding or rebound.   Musculoskeletal:         General: No deformity. Normal range of motion.      Cervical back: Normal range of motion and neck supple.      Right lower leg: No edema.      Left lower leg: No edema.   Skin:     General: Skin is warm and dry.      Coloration: Skin is not pale.      Findings: No erythema or rash.   Neurological:      General: No focal deficit present.      Mental Status: She is alert and oriented to person, place, and time.      Cranial Nerves: No cranial nerve deficit.   Psychiatric:         Mood and Affect: Mood normal.         Behavior: Behavior normal.         Thought Content: Thought content normal.         Procedures           ED Course  ED Course as of 12/21/22 2156   Wed Dec 21, 2022   1602 ECG 12 Lead Dyspnea  Sinus tachycardia ventricular rate 125,  QRS 68   Biatrial enlargement with pulmonary disease pattern. Right axis deviation;  nonspecific ST segment repolarization abnormalities with NO st elevation  Electronically signed by Sophia Galicia DO, 12/21/22, 4:09 PM EST. [LK]   1805 XR Chest 1 View  IMPRESSION:    Interstitial thickening which may or reflect CHF/edema superimposed on  COPD.  No consolidative airspace disease.     This report was finalized on 12/21/2022 4:46 PM by Dr. Loki Kent MD. [MH]   2020 CT Angiogram Chest Pulmonary Embolism  IMPRESSION:     1.  No evidence of PE.       2.  Stable scarring in the upper lobes and significant underlying emphysema.        Signer Name: Yumiko Lopez MD   Signed: 12/21/2022 8:03 PM [MH]   2039 Discussed with Dr. Salazar, hospitalist, he accepts patient is agreeable for admission. [MH]   2050 EKG at 2244 sinus rhythm 88 bpm, , cures 78, QTc 41, regular axis,  no significant ST deviation, no STEMI. [KP]      ED Course User Index  [KP] Loki Anand MD  [LK] Sophia Galicia DO  [] Ary Barfield PA-C                                           [facility]    Final diagnoses:   Influenza A       ED Disposition  ED Disposition     ED Disposition   Decision to Admit    Condition   --    Comment   Level of Care: Med/Surg [1]   Diagnosis: Influenza A [[id number]]   Admitting Physician: DAYANA ENRIQUEZ [[id number]]   Attending Physician: DAYANA ENRIQUEZ [[id number]]   Certification: I Certify That Inpatient Hospital Services Are Medically Necessary For Greater Than 2 Midnights               No follow-up provider specified.       Medication List      No changes were made to your prescriptions during this visit.          Ary Barfield PA-C  12/21/22 8438

## 2022-12-22 LAB
ALBUMIN SERPL-MCNC: 3.08 G/DL (ref 3.5–5.2)
ALBUMIN/GLOB SERPL: 1.2 G/DL
ALP SERPL-CCNC: 44 U/L (ref 39–117)
ALT SERPL W P-5'-P-CCNC: 19 U/L (ref 1–33)
ANION GAP SERPL CALCULATED.3IONS-SCNC: 10.9 MMOL/L (ref 5–15)
AST SERPL-CCNC: 35 U/L (ref 1–32)
BASOPHILS # BLD AUTO: 0 10*3/MM3 (ref 0–0.2)
BASOPHILS NFR BLD AUTO: 0 % (ref 0–1.5)
BILIRUB SERPL-MCNC: 0.3 MG/DL (ref 0–1.2)
BUN SERPL-MCNC: 15 MG/DL (ref 8–23)
BUN/CREAT SERPL: 26.8 (ref 7–25)
CALCIUM SPEC-SCNC: 7.9 MG/DL (ref 8.6–10.5)
CHLORIDE SERPL-SCNC: 107 MMOL/L (ref 98–107)
CO2 SERPL-SCNC: 21.1 MMOL/L (ref 22–29)
CREAT SERPL-MCNC: 0.56 MG/DL (ref 0.57–1)
DEPRECATED RDW RBC AUTO: 54.6 FL (ref 37–54)
EGFRCR SERPLBLD CKD-EPI 2021: 99.6 ML/MIN/1.73
EOSINOPHIL # BLD AUTO: 0 10*3/MM3 (ref 0–0.4)
EOSINOPHIL NFR BLD AUTO: 0 % (ref 0.3–6.2)
ERYTHROCYTE [DISTWIDTH] IN BLOOD BY AUTOMATED COUNT: 13.3 % (ref 12.3–15.4)
GLOBULIN UR ELPH-MCNC: 2.5 GM/DL
GLUCOSE SERPL-MCNC: 163 MG/DL (ref 65–99)
HCT VFR BLD AUTO: 38.3 % (ref 34–46.6)
HGB BLD-MCNC: 12.4 G/DL (ref 12–15.9)
IMM GRANULOCYTES # BLD AUTO: 0.01 10*3/MM3 (ref 0–0.05)
IMM GRANULOCYTES NFR BLD AUTO: 0.3 % (ref 0–0.5)
LYMPHOCYTES # BLD AUTO: 0.21 10*3/MM3 (ref 0.7–3.1)
LYMPHOCYTES NFR BLD AUTO: 5.4 % (ref 19.6–45.3)
MCH RBC QN AUTO: 35.5 PG (ref 26.6–33)
MCHC RBC AUTO-ENTMCNC: 32.4 G/DL (ref 31.5–35.7)
MCV RBC AUTO: 109.7 FL (ref 79–97)
MONOCYTES # BLD AUTO: 0.14 10*3/MM3 (ref 0.1–0.9)
MONOCYTES NFR BLD AUTO: 3.6 % (ref 5–12)
NEUTROPHILS NFR BLD AUTO: 3.52 10*3/MM3 (ref 1.7–7)
NEUTROPHILS NFR BLD AUTO: 90.7 % (ref 42.7–76)
NRBC BLD AUTO-RTO: 0 /100 WBC (ref 0–0.2)
PLATELET # BLD AUTO: 72 10*3/MM3 (ref 140–450)
PMV BLD AUTO: 11.1 FL (ref 6–12)
POTASSIUM SERPL-SCNC: 3.6 MMOL/L (ref 3.5–5.2)
PROT SERPL-MCNC: 5.6 G/DL (ref 6–8.5)
RBC # BLD AUTO: 3.49 10*6/MM3 (ref 3.77–5.28)
SODIUM SERPL-SCNC: 139 MMOL/L (ref 136–145)
WBC NRBC COR # BLD: 3.88 10*3/MM3 (ref 3.4–10.8)

## 2022-12-22 PROCEDURE — 25010000002 METHYLPREDNISOLONE PER 40 MG: Performed by: HOSPITALIST

## 2022-12-22 PROCEDURE — 94664 DEMO&/EVAL PT USE INHALER: CPT

## 2022-12-22 PROCEDURE — 94799 UNLISTED PULMONARY SVC/PX: CPT

## 2022-12-22 PROCEDURE — 80053 COMPREHEN METABOLIC PANEL: CPT | Performed by: HOSPITALIST

## 2022-12-22 PROCEDURE — 99232 SBSQ HOSP IP/OBS MODERATE 35: CPT | Performed by: INTERNAL MEDICINE

## 2022-12-22 PROCEDURE — 94761 N-INVAS EAR/PLS OXIMETRY MLT: CPT

## 2022-12-22 PROCEDURE — 85025 COMPLETE CBC W/AUTO DIFF WBC: CPT | Performed by: HOSPITALIST

## 2022-12-22 RX ORDER — GUAIFENESIN 600 MG/1
1200 TABLET, EXTENDED RELEASE ORAL EVERY 12 HOURS SCHEDULED
Status: DISCONTINUED | OUTPATIENT
Start: 2022-12-22 | End: 2022-12-24 | Stop reason: HOSPADM

## 2022-12-22 RX ORDER — OSELTAMIVIR PHOSPHATE 75 MG/1
75 CAPSULE ORAL EVERY 12 HOURS SCHEDULED
Status: DISCONTINUED | OUTPATIENT
Start: 2022-12-22 | End: 2022-12-24 | Stop reason: HOSPADM

## 2022-12-22 RX ORDER — FOLIC ACID 1 MG/1
500 TABLET ORAL EVERY OTHER DAY
Status: DISCONTINUED | OUTPATIENT
Start: 2022-12-22 | End: 2022-12-24 | Stop reason: HOSPADM

## 2022-12-22 RX ORDER — ROSUVASTATIN CALCIUM 20 MG/1
40 TABLET, COATED ORAL EVERY OTHER DAY
Status: DISCONTINUED | OUTPATIENT
Start: 2022-12-22 | End: 2022-12-24 | Stop reason: HOSPADM

## 2022-12-22 RX ORDER — ALBUTEROL SULFATE 2.5 MG/3ML
2.5 SOLUTION RESPIRATORY (INHALATION) EVERY 6 HOURS PRN
Status: DISCONTINUED | OUTPATIENT
Start: 2022-12-22 | End: 2022-12-24 | Stop reason: HOSPADM

## 2022-12-22 RX ORDER — ASPIRIN AND DIPYRIDAMOLE 25; 200 MG/1; MG/1
1 CAPSULE, EXTENDED RELEASE ORAL EVERY 12 HOURS SCHEDULED
Status: DISCONTINUED | OUTPATIENT
Start: 2022-12-22 | End: 2022-12-24 | Stop reason: HOSPADM

## 2022-12-22 RX ORDER — DIPHENOXYLATE HYDROCHLORIDE AND ATROPINE SULFATE 2.5; .025 MG/1; MG/1
1 TABLET ORAL DAILY
Status: DISCONTINUED | OUTPATIENT
Start: 2022-12-22 | End: 2022-12-24 | Stop reason: HOSPADM

## 2022-12-22 RX ADMIN — IPRATROPIUM BROMIDE AND ALBUTEROL SULFATE 3 ML: 2.5; .5 SOLUTION RESPIRATORY (INHALATION) at 06:36

## 2022-12-22 RX ADMIN — ASPIRIN AND DIPYRIDAMOLE 1 CAPSULE: 25; 200 CAPSULE, EXTENDED RELEASE ORAL at 21:08

## 2022-12-22 RX ADMIN — ROSUVASTATIN CALCIUM 40 MG: 20 TABLET, FILM COATED ORAL at 11:41

## 2022-12-22 RX ADMIN — BUDESONIDE AND FORMOTEROL FUMARATE DIHYDRATE 2 PUFF: 160; 4.5 AEROSOL RESPIRATORY (INHALATION) at 18:26

## 2022-12-22 RX ADMIN — OSELTAMIVIR PHOSPHATE 75 MG: 75 CAPSULE ORAL at 21:08

## 2022-12-22 RX ADMIN — Medication 10 ML: at 21:08

## 2022-12-22 RX ADMIN — OSELTAMIVIR PHOSPHATE 30 MG: 30 CAPSULE ORAL at 09:24

## 2022-12-22 RX ADMIN — IPRATROPIUM BROMIDE AND ALBUTEROL SULFATE 3 ML: 2.5; .5 SOLUTION RESPIRATORY (INHALATION) at 12:22

## 2022-12-22 RX ADMIN — Medication 1 TABLET: at 18:51

## 2022-12-22 RX ADMIN — Medication 10 ML: at 09:29

## 2022-12-22 RX ADMIN — GUAIFENESIN 1200 MG: 600 TABLET, EXTENDED RELEASE ORAL at 21:08

## 2022-12-22 RX ADMIN — IPRATROPIUM BROMIDE AND ALBUTEROL SULFATE 3 ML: 2.5; .5 SOLUTION RESPIRATORY (INHALATION) at 18:26

## 2022-12-22 RX ADMIN — METHYLPREDNISOLONE SODIUM SUCCINATE 40 MG: 40 INJECTION, POWDER, FOR SOLUTION INTRAMUSCULAR; INTRAVENOUS at 21:08

## 2022-12-22 RX ADMIN — BUDESONIDE AND FORMOTEROL FUMARATE DIHYDRATE 2 PUFF: 160; 4.5 AEROSOL RESPIRATORY (INHALATION) at 06:36

## 2022-12-22 RX ADMIN — METHYLPREDNISOLONE SODIUM SUCCINATE 40 MG: 40 INJECTION, POWDER, FOR SOLUTION INTRAMUSCULAR; INTRAVENOUS at 09:24

## 2022-12-22 RX ADMIN — FOLIC ACID 500 MCG: 1 TABLET ORAL at 11:42

## 2022-12-22 NOTE — PLAN OF CARE
Goal Outcome Evaluation:  Plan of Care Reviewed With: patient        Progress: no change  Outcome Evaluation: Pt admitted from ER overnight in stable conditon. VSS. PRN Tylenol adminstered for headache. No acute changes. Will continue to follow plan of care.

## 2022-12-22 NOTE — PROGRESS NOTES
[facility] HOSPITALIST PROGRESS NOTE    Subjective     History:   Florinda Barr is a 67 y.o. female admitted on 12/21/2022 secondary to Influenza A     Procedures: None    CC: Follow up influenza , COPD exacerbation    Patient seen and examined with VENITA Boateng. Awake and alert. Reports dyspnea and chest congestion but improved from yesterday. No reported CP or palpitations. No reported nausea or vomiting. O2 requirements stable.     History taken from: patient, chart, and RN.      Objective     Vital Signs  Temp:  [97.7 °F (36.5 °C)-98.4 °F (36.9 °C)] 97.7 °F (36.5 °C)  Heart Rate:  [] 72  Resp:  [18-30] 18  BP: ()/(45-59) 98/46    Intake/Output Summary (Last 24 hours) at 12/22/2022 1717  Last data filed at 12/22/2022 1400  Gross per 24 hour   Intake 1320 ml   Output --   Net 1320 ml         Physical Exam:  General:    Awake, alert, in no acute distress, thin, chronically ill appearing   Heart:      Normal S1 and S2. Regular rate and rhythm. No significant murmur, rubs or gallops appreciated.   Lungs:     Respirations regular, even and unlabored. Diminished breath sounds throughout. No wheezes, rales or rhonchi.   Abdomen:   Soft and nontender. No guarding, rebound tenderness or  organomegaly noted. Bowel sounds present x 4.   Extremities:  No clubbing, cyanosis or edema noted. Moves UE and LE equally B/L.     Results Review:    Results from last 7 days   Lab Units 12/22/22  0304 12/21/22  1617   WBC 10*3/mm3 3.88 3.54   HEMOGLOBIN g/dL 12.4 14.5   PLATELETS 10*3/mm3 72* 73*     Results from last 7 days   Lab Units 12/22/22  0304 12/21/22  1617   SODIUM mmol/L 139 137   POTASSIUM mmol/L 3.6 3.9   CHLORIDE mmol/L 107 102   CO2 mmol/L 21.1* 20.1*   BUN mg/dL 15 19   CREATININE mg/dL 0.56* 0.66   CALCIUM mg/dL 7.9* 8.6   GLUCOSE mg/dL 163* 109*     Results from last 7 days   Lab Units 12/22/22  0304 12/21/22  1617   BILIRUBIN mg/dL 0.3 0.6   ALK PHOS U/L 44 65   AST (SGOT) U/L 35* 36*    ALT (SGPT) U/L 19 23             Results from last 7 days   Lab Units 12/21/22  1617   TROPONIN T ng/mL <0.010       Imaging Results (Last 24 Hours)     Procedure Component Value Units Date/Time    CT Angiogram Chest Pulmonary Embolism [[id number]] Collected: 12/21/22 2003     Updated: 12/21/22 2005    Narrative:      PROCEDURE: CT CHEST PULMONARY EMBOLISM W CONTRAST    COMPARISON: CT chest low dose December 2021    INDICATIONS: Pulmonary embolism (PE) suspected, unknown D-dimer        TECHNIQUE:     CTA of the chest is performed with IV contrast. Multiplanar MIP and 3-D reconstructions  images are performed on separate workstation under concurrent radiologist supervision per protocol and reviewed.  Radiation dose reduction techniques included automated exposure control or exposure modulation based on body size. Count of known CT and cardiac nuc med studies performed in previous 12 months: 0.          FINDINGS: Exam is limited due to patient positioning with patient's arms within the gantry producing significant artifact. Assessment of these extraneous osseous structures is inadequate due to positioning of the arms to evaluate for acute osseous  abnormality. Study is performed for assessment for pulmonary embolus. There is no evidence of pulmonary embolus.  Lungs are grossly clear, no focal consolidation seen. Focal area of scarring seen in the left upper lobe laterally and bilateral apical pleural thickening present. Moderate atherosclerotic plaque seen in the aorta. Mild diffuse centrilobular  emphysematous change.   No gross acute bony abnormality seen.         Impression:        1.  No evidence of PE.      2.  Stable scarring in the upper lobes and significant underlying emphysema.       Signer Name: Yumiko Lopez MD   Signed: 12/21/2022 8:03 PM   Workstation Name: [facility]    [facility]            Medications:  aspirin-dipyridamole, 1 capsule, Oral, Q12H  budesonide-formoterol, 2  puff, Inhalation, BID - RT  folic acid, 500 mcg, Oral, Every Other Day  guaiFENesin, 1,200 mg, Oral, Q12H  ipratropium-albuterol, 3 mL, Nebulization, Q6H - RT  methylPREDNISolone sodium succinate, 40 mg, Intravenous, Q12H  oseltamivir, 75 mg, Oral, Q12H  rosuvastatin, 40 mg, Oral, Every Other Day  sodium chloride, 10 mL, Intravenous, Q12H               Assessment & Plan   Sepsis (present on admission): Likely 2/2 influenza A and COPD exacerbation. Afebrile and hemodynamically stable. WBC stable. Lactate and procal normal. CRP minimally elevated. Blood cultures with NGTD. No obvious infiltrates on imaging. Cont Tamiflu. Stop maintenance IVF's. Follow cultures and repeat labs in the AM.     (+) influenza A: Cont Tamiflu, isolation and supportive treatment.     Acute exacerbation of COPD: Cont treatment of influenza as above, steroids and nebs.     Acute on chronic thrombocytopenia: Likely 2/2 viral illness. Repeat CBC in the AM.     Chronic hypoxic respiratory failure: O2 requirements stable. Cont treatment as above.     Hyperlipidemia: Cont statin.    Severe malnutrition: Add MVI. Cont supplements.     DVT PPX: SCDs      Disposition: Likely home when medically stable.     Ashu Chávez,   12/22/22  17:17 EST

## 2022-12-22 NOTE — PLAN OF CARE
Goal Outcome Evaluation:           Progress: no change  Outcome Evaluation: pt resting in bed, pt ambulated to restroom with one assist, pt sat in chair at bedside, no acute changes, no complaints. will continue plan of care

## 2022-12-22 NOTE — PAYOR COMM NOTE
CONTACT:  Amy Jeronimo RN    Utilization Management Dept.   [facility]  [address] [place] [address]    Phone:[phone]  Fax: [phone]    REQUEST FOR INPATIENT AUTHORIZATION  REF # [id number]  ICD 10: A41.9, j10.1  ATTENDING MD:  Ashu Chávez    NPI:[id number]  FACILITY NPI: [id number]  ADM DATE: 12/21/2022      Florinda Cota (67 y.o. Female)     Date of Birth   1955    Social Security Number       Address   [address] [facility] [address]    Home Phone   [phone]    MRN   [id number]       [facility]    Marital Status                               Admission Date   12/21/22    Admission Type   Emergency    Admitting Provider   Luis Salazar MD    Attending Provider   Ashu Chávez DO    Department, Room/Bed   [address], [facility]9/       Discharge Date       Discharge Disposition       Discharge Destination                               Attending Provider: Ashu Chávez DO    Allergies: Codeine, Latex, Pletal [Cilostazol]    Isolation: Droplet   Infection: Influenza (12/21/22)   Code Status: CPR    Ht: 154.9 cm (61\")   Wt: 34.8 kg (76 lb 12.8 oz)    Admission Cmt: None   Principal Problem: Influenza A [J10.1]                 Active Insurance as of 12/21/2022     Primary Coverage     Payor Plan Insurance Group Employer/Plan Group    ANTHEM MEDICARE REPLACEMENT ANTHEM MEDICARE ADVANTAGE [id number]     Payor Plan Address Payor Plan Phone Number Payor Plan Fax Number Effective Dates    [address] [phone]  1/1/2022 - None Entered    [facility] [id number]       Subscriber Name Subscriber Birth Date Member ID       FLORINDA COTA 1955 [id number]           Secondary Coverage     Payor Plan Insurance Group Employer/Plan Group    [employer] [place] WELL[place]ARE MEDICAID      Payor Plan Address Payor Plan Phone Number Payor Plan Fax Number Effective Dates    [address] [address] [phone]  2/1/2016 - None Entered     [facility] [id number]       Subscriber Name Subscriber Birth Date Member ID       FLORINDA COTA 1955 [id number]                 Emergency Contacts      (Rel.) Home Phone Work Phone Mobile Phone    Tyra Cota (Daughter) [phone] -- --               History & Physical      Luis Slaazar MD at 22          Hospitalist History and Physical        Patient Identification  Name: Flornida oCta  Age/Sex: 67 y.o. female  :  1955        MRN: [id number]  Visit Number: [id number]  Admit Date: 2022   PCP: Tata Varghese APRN          Chief complaint fever, chills, body aches, increased productive cough, short of breath    History of Present Illness:  Patient is a 67 y.o. female with history of COPD on 1L NC at home, HLD, PVD, and stroke, who presents with complaints of fever, chills, nasal drainage, sore throat and body aches over the last 2-3 days, accompanied by increased productive cough with clear sputum and increased shortness of breath. She denies chest pain or lower extremity edema. She tested positive for influenza A in the ED. She denies any recent sick contacts, stating her daughter and granddaughter have had the flu recently but she has not been around them. She did not receive the flu shot this year.     Review of Systems  Review of Systems   Constitutional: Positive for activity change, appetite change (states she has not eaten in 3 days), chills, fatigue and fever. Negative for diaphoresis and unexpected weight change.   HENT: Positive for congestion, postnasal drip, rhinorrhea and sore throat.    Eyes: Negative for photophobia, pain, discharge, redness, itching and visual disturbance.   Respiratory: Positive for cough and shortness of breath. Negative for wheezing.    Cardiovascular: Negative for chest pain, palpitations and leg swelling.   Gastrointestinal: Negative for abdominal distention, abdominal pain, constipation, diarrhea, nausea and vomiting.    Endocrine: Negative for cold intolerance, heat intolerance, polydipsia, polyphagia and polyuria.   Genitourinary: Negative for difficulty urinating, dysuria, flank pain, frequency and hematuria.   Musculoskeletal: Positive for myalgias. Negative for arthralgias, back pain, joint swelling, neck pain and neck stiffness.   Skin: Negative for color change, pallor, rash and wound.   Neurological: Positive for weakness (generalized). Negative for dizziness, tremors, seizures, syncope, light-headedness, numbness and headaches.   Hematological: Negative for adenopathy. Does not bruise/bleed easily.   Psychiatric/Behavioral: Negative for agitation, behavioral problems and confusion.       History  Past Medical History:   Diagnosis Date   • COPD (chronic obstructive pulmonary disease) (HCC)    • Hyperlipidemia    • Peripheral vascular disease (HCC)    • Stroke (HCC)      Past Surgical History:   Procedure Laterality Date   • CARDIAC CATHETERIZATION Left 4/6/2021    Procedure: Peripheral angiography;  Surgeon: Dae Callahan MD;  Location: [facility] INVASIVE LOCATION;  Service: Cardiovascular;  Laterality: Left;     Family History   Problem Relation Age of Onset   • Heart disease Mother    • Hypertension Mother    • Diabetes Mother      Social History     Tobacco Use   • Smoking status: Every Day     Packs/day: 0.25     Years: 50.00     Pack years: 12.50     Types: Cigarettes   • Smokeless tobacco: Never   Substance Use Topics   • Alcohol use: No   • Drug use: No     (Not in a hospital admission)    Allergies:  Codeine, Latex, and Pletal [cilostazol]    Objective      Vital Signs  Temp:  [99.8 °F (37.7 °C)] 99.8 °F (37.7 °C)  Heart Rate:  [100-136] 100  Resp:  [24-32] 24  BP: (112-128)/(56-72) 112/56  Body mass index is 13.79 kg/m².    Physical Exam:  Physical Exam  Constitutional:       General: She is not in acute distress.     Appearance: She is ill-appearing.      Comments: Cachectic in appearance   HENT:       Right Ear: External ear normal.      Left Ear: External ear normal.      Nose: Nose normal.      Mouth/Throat:      Mouth: Mucous membranes are dry.      Pharynx: Oropharynx is clear.   Eyes:      Extraocular Movements: Extraocular movements intact.      Conjunctiva/sclera: Conjunctivae normal.      Pupils: Pupils are equal, round, and reactive to light.   Cardiovascular:      Rate and Rhythm: Regular rhythm. Tachycardia present.      Pulses: Normal pulses.      Heart sounds: Normal heart sounds. No murmur heard.  Pulmonary:      Breath sounds: No wheezing, rhonchi or rales.      Comments: Mild tachypnea. Very poor air movement diffusely on auscultation of chest.   Abdominal:      General: Abdomen is flat. Bowel sounds are normal. There is no distension.      Palpations: Abdomen is soft.      Tenderness: There is no abdominal tenderness.   Musculoskeletal:         General: Normal range of motion.      Cervical back: Normal range of motion and neck supple. No tenderness.      Right lower leg: No edema.      Left lower leg: No edema.   Lymphadenopathy:      Cervical: No cervical adenopathy.   Skin:     General: Skin is warm and dry.      Capillary Refill: Capillary refill takes less than 2 seconds.      Findings: No bruising or lesion.   Neurological:      General: No focal deficit present.      Mental Status: She is alert and oriented to person, place, and time.   Psychiatric:         Mood and Affect: Mood normal.         Behavior: Behavior normal.         Thought Content: Thought content normal.         Judgment: Judgment normal.           Results Review:       Lab Results:  Results from last 7 days   Lab Units 12/21/22  1617   WBC 10*3/mm3 3.54   HEMOGLOBIN g/dL 14.5   PLATELETS 10*3/mm3 73*     Results from last 7 days   Lab Units 12/21/22  1617   CRP mg/dL 1.15*     Results from last 7 days   Lab Units 12/21/22  1617   SODIUM mmol/L 137   POTASSIUM mmol/L 3.9   CHLORIDE mmol/L 102   CO2 mmol/L 20.1*   BUN  mg/dL 19   CREATININE mg/dL 0.66   CALCIUM mg/dL 8.6   GLUCOSE mg/dL 109*         No results found for: HGBA1C  Results from last 7 days   Lab Units 12/21/22  1617   BILIRUBIN mg/dL 0.6   ALK PHOS U/L 65   AST (SGOT) U/L 36*   ALT (SGPT) U/L 23     Results from last 7 days   Lab Units 12/21/22  1617   TROPONIN T ng/mL <0.010             Results from last 7 days   Lab Units 12/21/22  1611   PH, ARTERIAL pH units 7.402   PO2 ART mm Hg 64.1*   PCO2, ARTERIAL mm Hg 40.3   HCO3 ART mmol/L 25.0       I have reviewed the patient's laboratory results.    Imaging:  Imaging Results (Last 72 Hours)     Procedure Component Value Units Date/Time    CT Angiogram Chest Pulmonary Embolism [[id number]] Collected: 12/21/22 2003     Updated: 12/21/22 2005    Narrative:      PROCEDURE: CT CHEST PULMONARY EMBOLISM W CONTRAST    COMPARISON: CT chest low dose December 2021    INDICATIONS: Pulmonary embolism (PE) suspected, unknown D-dimer        TECHNIQUE:     CTA of the chest is performed with IV contrast. Multiplanar MIP and 3-D reconstructions  images are performed on separate workstation under concurrent radiologist supervision per protocol and reviewed.  Radiation dose reduction techniques included automated exposure control or exposure modulation based on body size. Count of known CT and cardiac nuc med studies performed in previous 12 months: 0.          FINDINGS: Exam is limited due to patient positioning with patient's arms within the gantry producing significant artifact. Assessment of these extraneous osseous structures is inadequate due to positioning of the arms to evaluate for acute osseous  abnormality. Study is performed for assessment for pulmonary embolus. There is no evidence of pulmonary embolus.  Lungs are grossly clear, no focal consolidation seen. Focal area of scarring seen in the left upper lobe laterally and bilateral apical pleural thickening present. Moderate atherosclerotic plaque seen in the aorta. Mild  diffuse centrilobular  emphysematous change.   No gross acute bony abnormality seen.         Impression:        1.  No evidence of PE.      2.  Stable scarring in the upper lobes and significant underlying emphysema.       Signer Name: Yumiko Lopez MD   Signed: 2022 8:03 PM   Workstation Name: [id number]    [facility]    XR Chest 1 View [[id number]] Collected: 22     Updated: 22    Narrative:      EXAM:    XR Chest, 1 View     EXAM DATE:    2022 4:17 PM     CLINICAL HISTORY:    CHF/COPD Protocol     TECHNIQUE:    Frontal view of the chest.     COMPARISON:    2021     FINDINGS:    Lungs:  Emphysema again noted.  Interstitial thickening may reflect  superimposed edema.      Pleural space:  No pneumothorax.    Heart:  Mild cardiac enlargement.    Mediastinum:  Unremarkable.    Bones/joints:  Stable appearance of bony structures.       Impression:        Interstitial thickening which may or reflect CHF/edema superimposed on  COPD.  No consolidative airspace disease.     This report was finalized on 2022 4:46 PM by Dr. Loki Kent MD.             I have personally reviewed the patient's radiologic imaging.        EKst-  Suspect arm lead reversal, interpretation assumes no reversal  Sinus tachycardia, , QTc 450  Biatrial enlargement  Rightward axis  Pulmonary disease pattern  Nonspecific ST abnormality  Abnormal ECG  When compared with ECG of 2021 09:24,  ST now depressed in Inferior leads    2nd-  Sinus rhythm with short NH, HR 88, QTc 481  Biatrial enlargement  Abnormal ECG  When compared with ECG of 21-DEC-2022 16:02, (Unconfirmed)  ST no longer depressed in Inferior leads    I have personally reviewed the patient's EKGs. Suspect ST depression in inferior leads was secondary to cardiac strain from tachycardia, resolved on repeat EKG now that HR better controlled        Assessment & Plan     - Sepsis, present on admission  with tachycardia and tachypnea, secondary to influenza A infection causing acute COPD exacerbation: treat Influenza A with tamiflu. Continue treatment of COPD exacerbation with IV solu-medrol and scheduled nebs as she already feels much better after receiving these in the ED. No evidence of pneumonia on CXR or CT chest imaging and procal and WBC are normal with CRP only marginally elevated, so will not give antibiotics at this time. Currently tolerating home 1L NC well with O2 sat in low 90s; titrate to keep O2 sat >90%.  - Thrombocytopenia, acute on chronic: appears to have chronically low platelets in the low 100-120s range dating back to 11/2021. Suspect acute drop today is related to underlying viral illness. Continue to monitor platelet count closely. Hold heparin products for DVT prophylaxis for now.  - History hyperlipidemia, PVD, stroke: review home meds once reconciled by pharmacy and resume as deemed appropriate.    DVT Prophylaxis: SCDs (avoid heparin products for now in light of acute on chronic thrombocytopenia)    Estimated Length of Stay >2 midnights    I discussed the patient's findings, assessment and plan with the patient and ED provider Ary Barfield PA-C.    * patient is high risk due to sepsis, influenza A virus, COPD exacerbation    Luis Salazar MD  12/21/22  20:55 EST      Electronically signed by Luis Salazar MD at 12/21/22 2110          Emergency Department Notes      Ary Barfield PA-C at 12/21/22 1607          Subjective   History of Present Illness  Patient is a 67-year-old female with COPD, hyperlipidemia, peripheral vascular disease, and history of strokes presents to the ED with complaints of cough, congestion, body aches, fever, chills, and worsening shortness of breath.  She states she wears a liter of oxygen daily.  She states she has been continuing to wear this and she feels having worsening shortness of breath over the last 2 days.  She denies associated  chest pain, nausea, vomiting, headache, dizziness, abdominal pain, dysuria, diarrhea, or constipation.  She does smoke approximately half a pack a day.    History provided by:  Patient   used: No        Review of Systems   Constitutional: Positive for chills and fever. Negative for diaphoresis and fatigue.   HENT: Positive for congestion. Negative for ear discharge, ear pain, facial swelling, hearing loss, postnasal drip, rhinorrhea, sinus pressure, sinus pain, sneezing, sore throat, tinnitus and trouble swallowing.    Eyes: Negative.  Negative for photophobia, pain, discharge, redness and itching.   Respiratory: Positive for cough, shortness of breath and wheezing.    Cardiovascular: Negative.  Negative for chest pain.   Gastrointestinal: Negative.  Negative for abdominal distention, abdominal pain, constipation, diarrhea, nausea and vomiting.   Endocrine: Negative.    Genitourinary: Negative.  Negative for dysuria.   Musculoskeletal: Positive for myalgias. Negative for arthralgias, back pain, gait problem, joint swelling, neck pain and neck stiffness.   Skin: Negative.    Neurological: Negative.  Negative for dizziness, tremors, seizures, syncope, facial asymmetry, speech difficulty, weakness, light-headedness, numbness and headaches.   Psychiatric/Behavioral: Negative.  Negative for confusion.   All other systems reviewed and are negative.      Past Medical History:   Diagnosis Date   • COPD (chronic obstructive pulmonary disease) (HCC)    • Hyperlipidemia    • Peripheral vascular disease (HCC)    • Stroke (HCC)        Allergies   Allergen Reactions   • Codeine Swelling   • Latex Hives   • Pletal [Cilostazol] Rash     Welps       Past Surgical History:   Procedure Laterality Date   • CARDIAC CATHETERIZATION Left 4/6/2021    Procedure: Peripheral angiography;  Surgeon: Dae Callahan MD;  Location: [facility] INVASIVE LOCATION;  Service: Cardiovascular;  Laterality: Left;        Family History   Problem Relation Age of Onset   • Heart disease Mother    • Hypertension Mother    • Diabetes Mother        Social History     Socioeconomic History   • Marital status:    • Number of children: 3   Tobacco Use   • Smoking status: Every Day     Packs/day: 0.25     Years: 50.00     Pack years: 12.50     Types: Cigarettes   • Smokeless tobacco: Never   Substance and Sexual Activity   • Alcohol use: No   • Drug use: No   • Sexual activity: Defer           Objective   Physical Exam  Vitals and nursing note reviewed.   Constitutional:       General: She is not in acute distress.     Appearance: She is well-developed. She is not diaphoretic.   HENT:      Head: Normocephalic and atraumatic.      Right Ear: External ear normal.      Left Ear: External ear normal.      Nose: Congestion present.      Mouth/Throat:      Mouth: Mucous membranes are moist.      Pharynx: Oropharynx is clear. Posterior oropharyngeal erythema present. No oropharyngeal exudate.   Eyes:      Extraocular Movements: Extraocular movements intact.      Conjunctiva/sclera: Conjunctivae normal.      Pupils: Pupils are equal, round, and reactive to light.   Neck:      Vascular: No JVD.      Trachea: No tracheal deviation.   Cardiovascular:      Rate and Rhythm: Normal rate and regular rhythm.      Pulses: Normal pulses.      Heart sounds: Normal heart sounds. No murmur heard.  Pulmonary:      Effort: Pulmonary effort is normal. Tachypnea present. No accessory muscle usage or respiratory distress.      Breath sounds: No stridor. Wheezing present.   Abdominal:      General: Bowel sounds are normal. There is no distension.      Palpations: Abdomen is soft.      Tenderness: There is no abdominal tenderness. There is no guarding or rebound.   Musculoskeletal:         General: No deformity. Normal range of motion.      Cervical back: Normal range of motion and neck supple.      Right lower leg: No edema.      Left lower leg: No  edema.   Skin:     General: Skin is warm and dry.      Coloration: Skin is not pale.      Findings: No erythema or rash.   Neurological:      General: No focal deficit present.      Mental Status: She is alert and oriented to person, place, and time.      Cranial Nerves: No cranial nerve deficit.   Psychiatric:         Mood and Affect: Mood normal.         Behavior: Behavior normal.         Thought Content: Thought content normal.         Procedures          ED Course  ED Course as of 12/21/22 2156   Wed Dec 21, 2022   1602 ECG 12 Lead Dyspnea  Sinus tachycardia ventricular rate 125,  QRS 68   Biatrial enlargement with pulmonary disease pattern. Right axis deviation;  nonspecific ST segment repolarization abnormalities with NO st elevation  Electronically signed by Sophia Galicia DO, 12/21/22, 4:09 PM EST. [LK]   1805 XR Chest 1 View  IMPRESSION:    Interstitial thickening which may or reflect CHF/edema superimposed on  COPD.  No consolidative airspace disease.     This report was finalized on 12/21/2022 4:46 PM by Dr. Loki Kent MD. []   2020 CT Angiogram Chest Pulmonary Embolism  IMPRESSION:     1.  No evidence of PE.       2.  Stable scarring in the upper lobes and significant underlying emphysema.        Signer Name: Yumiko Lopez MD   Signed: 12/21/2022 8:03 PM [MH]   2039 Discussed with Dr. Salazar, hospitalist, he accepts patient is agreeable for admission. []   2050 EKG at 2244 sinus rhythm 88 bpm, , cures 78, QTc 41, regular axis, no significant ST deviation, no STEMI. [KP]      ED Course User Index  [KP] Loki Aannd MD  [LK] Sophia Galicia DO  [] Ary Barfield PA-C                                           [facility]    Final diagnoses:   Influenza A       ED Disposition  ED Disposition     ED Disposition   Decision to Admit    Condition   --    Comment   Level of Care: Med/Surg [1]   Diagnosis: Influenza A [896605]   Admitting Physician: DAYANA SALAZAR [[id number]]    Attending Physician: DAYANA ENRIQUEZ [[id number]]   Certification: I Certify That Inpatient Hospital Services Are Medically Necessary For Greater Than 2 Midnights               No follow-up provider specified.       Medication List      No changes were made to your prescriptions during this visit.          Ary Barfield PA-C  12/21/22 2156      Electronically signed by Ary Barfield PA-C at 12/21/22 2156     Melly Novak at 12/21/22 2051        Ekg performed by tech at 2054 given to Dr. Anand     Electronically signed by Melly Novak at 12/21/22 2051     Jessica Ramirez, RN at 12/21/22 2146        Report given to Gilma NATHAN    Electronically signed by Jessica Ramirez, RN at 12/21/22 2146       Vital Signs (last day)     Date/Time Temp Temp src Pulse Resp BP Patient Position SpO2    12/22/22 0646 -- -- 80 20 -- -- 97    12/22/22 0636 -- -- 83 20 -- -- 95    12/22/22 0600 98.3 (36.8) Oral 65 18 106/51 Lying 96    12/22/22 0300 97.9 (36.6) Oral 97 18 98/58 Lying 95    12/22/22 0043 -- -- 106 18 -- -- 94    12/21/22 2238 98.4 (36.9) Oral 103 20 113/54 Lying 94    12/21/22 2137 -- -- 109 -- 101/56 -- 91    12/21/22 2121 -- -- 92 -- 94/45 -- 91    12/21/22 2059 -- -- 102 25 -- -- --    12/21/22 2052 -- -- 100 24 107/56 -- 92    12/21/22 2021 -- -- 104 -- 112/56 -- 92    12/21/22 2017 -- -- -- -- 128/59 -- --    12/21/22 1818 -- -- 115 30 -- -- 92    12/21/22 1810 -- -- 100 25 -- -- 92    12/21/22 1619 -- -- 122 30 -- -- 94    12/21/22 1611 -- -- 131 32 -- -- 90    12/21/22 1556 -- -- 133 -- 113/72 -- 90    12/21/22 1551 99.8 (37.7) Oral 136 26 125/64 Sitting 92    12/21/22 1549 -- -- 136 -- 125/64 -- 92            Current Facility-Administered Medications   Medication Dose Route Frequency Provider Last Rate Last Admin   • acetaminophen (TYLENOL) tablet 650 mg  650 mg Oral Q6H PRN Stephanie Montoya PA-C   650 mg at 12/21/22 8522   • budesonide-formoterol (SYMBICORT) 160-4.5 MCG/ACT inhaler 2 puff  2  puff Inhalation BID - RT Luis Salazar MD   2 puff at 12/22/22 0636   • ipratropium (ATROVENT) nebulizer solution 0.5 mg  0.5 mg Nebulization Q6H PRN Luis Salazar MD       • ipratropium-albuterol (DUO-NEB) nebulizer solution 3 mL  3 mL Nebulization Q6H - RT Luis Salazar MD   3 mL at 12/22/22 0636   • methylPREDNISolone sodium succinate (SOLU-Medrol) injection 40 mg  40 mg Intravenous Q12H Lusi Salazar MD       • oseltamivir (TAMIFLU) capsule 30 mg  30 mg Oral Q12H Luis Salazar MD       • sodium chloride 0.9 % flush 10 mL  10 mL Intravenous PRN Luis Salazar MD       • sodium chloride 0.9 % flush 10 mL  10 mL Intravenous Q12H Luis Salazar MD   10 mL at 12/21/22 2233   • sodium chloride 0.9 % flush 10 mL  10 mL Intravenous PRN Luis Salazar MD       • sodium chloride 0.9 % infusion 40 mL  40 mL Intravenous PRN Luis Salazar MD       • sodium chloride 0.9 % infusion  100 mL/hr Intravenous Continuous Luis Salazar  mL/hr at 12/21/22 2300 100 mL/hr at 12/21/22 2300       Lab Results (last 24 hours)     Procedure Component Value Units Date/Time    Comprehensive Metabolic Panel [[id number]]  (Abnormal) Collected: 12/22/22 0304    Specimen: Blood Updated: 12/22/22 0409     Glucose 163 mg/dL      BUN 15 mg/dL      Creatinine 0.56 mg/dL      Sodium 139 mmol/L      Potassium 3.6 mmol/L      Comment: Slight hemolysis detected by analyzer. Results may be affected.        Chloride 107 mmol/L      CO2 21.1 mmol/L      Calcium 7.9 mg/dL      Total Protein 5.6 g/dL      Albumin 3.08 g/dL      ALT (SGPT) 19 U/L      AST (SGOT) 35 U/L      Alkaline Phosphatase 44 U/L      Total Bilirubin 0.3 mg/dL      Globulin 2.5 gm/dL      A/G Ratio 1.2 g/dL      BUN/Creatinine Ratio 26.8     Anion Gap 10.9 mmol/L      eGFR 99.6 mL/min/1.73      Comment: National Kidney Foundation and American Society of Nephrology (ASN) Task Force recommended  calculation based on the Chronic Kidney Disease Epidemiology Collaboration (CKD-EPI) equation refit without adjustment for race.       Narrative:      GFR Normal >60  Chronic Kidney Disease <60  Kidney Failure <15      CBC Auto Differential [[id number]]  (Abnormal) Collected: 12/22/22 0304    Specimen: Blood Updated: 12/22/22 0351     WBC 3.88 10*3/mm3      RBC 3.49 10*6/mm3      Hemoglobin 12.4 g/dL      Hematocrit 38.3 %      .7 fL      MCH 35.5 pg      MCHC 32.4 g/dL      RDW 13.3 %      RDW-SD 54.6 fl      MPV 11.1 fL      Platelets 72 10*3/mm3      Neutrophil % 90.7 %      Lymphocyte % 5.4 %      Monocyte % 3.6 %      Eosinophil % 0.0 %      Basophil % 0.0 %      Immature Grans % 0.3 %      Neutrophils, Absolute 3.52 10*3/mm3      Lymphocytes, Absolute 0.21 10*3/mm3      Monocytes, Absolute 0.14 10*3/mm3      Eosinophils, Absolute 0.00 10*3/mm3      Basophils, Absolute 0.00 10*3/mm3      Immature Grans, Absolute 0.01 10*3/mm3      nRBC 0.0 /100 WBC     Urinalysis, Microscopic Only - Urine, Clean Catch [[id number]]  (Abnormal) Collected: 12/21/22 2015    Specimen: Urine, Clean Catch Updated: 12/21/22 2036     RBC, UA 3-5 /HPF      WBC, UA 0-2 /HPF      Bacteria, UA Trace /HPF      Squamous Epithelial Cells, UA 3-6 /HPF      Hyaline Casts, UA 0-2 /LPF      Methodology Manual Light Microscopy    Urinalysis With Microscopic If Indicated (No Culture) - Urine, Clean Catch [[id number]]  (Abnormal) Collected: 12/21/22 2015    Specimen: Urine, Clean Catch Updated: 12/21/22 2031     Color, UA Yellow     Appearance, UA Clear     pH, UA 5.5     Specific Gravity, UA >1.030     Glucose, UA Negative     Ketones, UA 15 mg/dL (1+)     Bilirubin, UA Negative     Blood, UA Small (1+)     Protein, UA 30 mg/dL (1+)     Leuk Esterase, UA Negative     Nitrite, UA Negative     Urobilinogen, UA 1.0 E.U./dL    [place] Draw [[id number]] Collected: 12/21/22 1617    Specimen: Blood from Arm, Right Updated: 12/21/22 1504    Narrative:       The following orders were created for panel order [place] Draw.  Procedure                               Abnormality         Status                     ---------                               -----------         ------                     Green Top (Gel)[[id number]]                                  Final result               Lavender Top[[id number]]                                                                Gold Top - SST[[id number]]                                   Final result               Light Blue Top[[id number]]                                   Final result                 Please view results for these tests on the individual orders.    Green Top (Gel) [[id number]] Collected: 12/21/22 1617    Specimen: Blood from Arm, Right Updated: 12/21/22 1731     Extra Tube Hold for add-ons.     Comment: Auto resulted.       Gold Top - SST [[id number]] Collected: 12/21/22 1617    Specimen: Blood from Arm, Right Updated: 12/21/22 1731     Extra Tube Hold for add-ons.     Comment: Auto resulted.       Light Blue Top [[id number]] Collected: 12/21/22 1617    Specimen: Blood from Arm, Right Updated: 12/21/22 1731     Extra Tube Hold for add-ons.     Comment: Auto resulted       Procalcitonin [[id number]]  (Normal) Collected: 12/21/22 1617    Specimen: Blood from Arm, Right Updated: 12/21/22 1700     Procalcitonin 0.13 ng/mL     Narrative:      As a Marker for Sepsis (Non-Neonates):    1. <0.5 ng/mL represents a low risk of severe sepsis and/or septic shock.  2. >2 ng/mL represents a high risk of severe sepsis and/or septic shock.    As a Marker for Lower Respiratory Tract Infections that require antibiotic therapy:    PCT on Admission    Antibiotic Therapy       6-12 Hrs later    >0.5                Strongly Recommended  >0.25 - <0.5        Recommended   0.1 - 0.25          Discouraged              Remeasure/reassess PCT  <0.1                Strongly Discouraged     Remeasure/reassess PCT    As 28 day mortality risk marker:  \"Change in Procalcitonin Result\" (>80% or <=80%) if Day 0 (or Day 1) and Day 4 values are available. Refer to http://www.[facility]-pct-calculator.com    Change in PCT <=80%  A decrease of PCT levels below or equal to 80% defines a positive change in PCT test result representing a higher risk for 28-day all-cause mortality of patients diagnosed with severe sepsis for septic shock.    Change in PCT >80%  A decrease of PCT levels of more than 80% defines a negative change in PCT result representing a lower risk for 28-day all-cause mortality of patients diagnosed with severe sepsis or septic shock.       BNP [[id number]]  (Normal) Collected: 12/21/22 1617    Specimen: Blood from Arm, Right Updated: 12/21/22 1657     proBNP 256.7 pg/mL     Narrative:      Among patients with dyspnea, NT-proBNP is highly sensitive for the detection of acute congestive heart failure. In addition NT-proBNP of <300 pg/ml effectively rules out acute congestive heart failure with 99% negative predictive value.      Troponin [[id number]]  (Normal) Collected: 12/21/22 1617    Specimen: Blood from Arm, Right Updated: 12/21/22 1657     Troponin T <0.010 ng/mL     Narrative:      Troponin T Reference Range:  <= 0.03 ng/mL-   Negative for AMI  >0.03 ng/mL-     Abnormal for myocardial necrosis.  Clinicians would have to utilize clinical acumen, EKG, Troponin and serial changes to determine if it is an Acute Myocardial Infarction or myocardial injury due to an underlying chronic condition.       Results may be falsely decreased if patient taking Biotin.      Comprehensive Metabolic Panel [[id number]]  (Abnormal) Collected: 12/21/22 1617    Specimen: Blood from Arm, Right Updated: 12/21/22 1653     Glucose 109 mg/dL      BUN 19 mg/dL      Creatinine 0.66 mg/dL      Sodium 137 mmol/L      Potassium 3.9 mmol/L      Comment: Slight hemolysis detected by analyzer. Results may be affected.        Chloride 102 mmol/L      CO2 20.1 mmol/L      Calcium 8.6 mg/dL       Total Protein 6.8 g/dL      Albumin 3.75 g/dL      ALT (SGPT) 23 U/L      AST (SGOT) 36 U/L      Alkaline Phosphatase 65 U/L      Total Bilirubin 0.6 mg/dL      Globulin 3.1 gm/dL      A/G Ratio 1.2 g/dL      BUN/Creatinine Ratio 28.8     Anion Gap 14.9 mmol/L      eGFR 95.7 mL/min/1.73      Comment: National Kidney Foundation and American Society of Nephrology (ASN) Task Force recommended calculation based on the Chronic Kidney Disease Epidemiology Collaboration (CKD-EPI) equation refit without adjustment for race.       Narrative:      GFR Normal >60  Chronic Kidney Disease <60  Kidney Failure <15      C-reactive Protein [[id number]]  (Abnormal) Collected: 12/21/22 1617    Specimen: Blood from Arm, Right Updated: 12/21/22 1653     C-Reactive Protein 1.15 mg/dL     COVID-19 and FLU A/B PCR - Swab, Nasopharynx [[id number]]  (Abnormal) Collected: 12/21/22 1617    Specimen: Swab from Nasopharynx Updated: 12/21/22 1649     COVID19 Not Detected     Influenza A PCR Detected     Influenza B PCR Not Detected    Narrative:      Fact sheet for providers: https://www.fda.gov/media/015553/download    Fact sheet for patients: https://www.fda.gov/media/123275/download    Test performed by PCR.    Lactic Acid, Plasma [[id number]]  (Normal) Collected: 12/21/22 1617    Specimen: Blood from Arm, Right Updated: 12/21/22 1648     Lactate 1.6 mmol/L     CBC & Differential [[id number]]  (Abnormal) Collected: 12/21/22 1617    Specimen: Blood from Arm, Right Updated: 12/21/22 1629    Narrative:      The following orders were created for panel order CBC & Differential.  Procedure                               Abnormality         Status                     ---------                               -----------         ------                     CBC Auto Differential[[id number]]        Abnormal            Final result               Scan Slide[[id number]]                                                                    Please view results for  these tests on the individual orders.    CBC Auto Differential [[id number]]  (Abnormal) Collected: 12/21/22 1617    Specimen: Blood from Arm, Right Updated: 12/21/22 1629     WBC 3.54 10*3/mm3      RBC 4.08 10*6/mm3      Hemoglobin 14.5 g/dL      Hematocrit 42.5 %      .2 fL      MCH 35.5 pg      MCHC 34.1 g/dL      RDW 13.1 %      RDW-SD 49.9 fl      MPV 10.6 fL      Platelets 73 10*3/mm3      Neutrophil % 82.8 %      Lymphocyte % 11.0 %      Monocyte % 5.6 %      Eosinophil % 0.0 %      Basophil % 0.3 %      Immature Grans % 0.3 %      Neutrophils, Absolute 2.93 10*3/mm3      Lymphocytes, Absolute 0.39 10*3/mm3      Monocytes, Absolute 0.20 10*3/mm3      Eosinophils, Absolute 0.00 10*3/mm3      Basophils, Absolute 0.01 10*3/mm3      Immature Grans, Absolute 0.01 10*3/mm3      nRBC 0.0 /100 WBC     Blood Culture - Blood, Arm, Right [[id number]] Collected: 12/21/22 1617    Specimen: Blood from Arm, Right Updated: 12/21/22 1627    Blood Culture - Blood, Arm, Left [[id number]] Collected: 12/21/22 1617    Specimen: Blood from Arm, Left Updated: 12/21/22 1627    Blood Gas, Arterial With Co-Ox [[id number]]  (Abnormal) Collected: 12/21/22 1611    Specimen: Arterial Blood Updated: 12/21/22 1616     Site Left Radial     Norman's Test Positive     pH, Arterial 7.402 pH units      pCO2, Arterial 40.3 mm Hg      pO2, Arterial 64.1 mm Hg      Comment: 84 Value below reference range        HCO3, Arterial 25.0 mmol/L      Base Excess, Arterial 0.2 mmol/L      O2 Saturation, Arterial 93.3 %      Comment: 84 Value below reference range        Hemoglobin, Blood Gas 15.0 g/dL      Hematocrit, Blood Gas 45.9 %      Oxyhemoglobin 91.5 %      Comment: 84 Value below reference range        Methemoglobin 0.40 %      Carboxyhemoglobin 1.5 %      A-a DO2 54.7 mmHg      CO2 Content 26.3 mmol/L      Temperature 0.0 C      Barometric Pressure for Blood Gas 731 mmHg      Modality Nasal Cannula     FIO2 24 %      Flow Rate 1.0 lpm       Ventilator Mode NA     Note --     Collected by [id number]     Comment: Meter: [id number]     :  [id number]        pH, Temp Corrected --     pCO2, Temperature Corrected --     pO2, Temperature Corrected --        Imaging Results (Last 24 Hours)     Procedure Component Value Units Date/Time    CT Angiogram Chest Pulmonary Embolism [[id number]] Collected: 12/21/22 2003     Updated: 12/21/22 2005    Narrative:      PROCEDURE: CT CHEST PULMONARY EMBOLISM W CONTRAST    COMPARISON: CT chest low dose December 2021    INDICATIONS: Pulmonary embolism (PE) suspected, unknown D-dimer        TECHNIQUE:     CTA of the chest is performed with IV contrast. Multiplanar MIP and 3-D reconstructions  images are performed on separate workstation under concurrent radiologist supervision per protocol and reviewed.  Radiation dose reduction techniques included automated exposure control or exposure modulation based on body size. Count of known CT and cardiac nuc med studies performed in previous 12 months: 0.          FINDINGS: Exam is limited due to patient positioning with patient's arms within the gantry producing significant artifact. Assessment of these extraneous osseous structures is inadequate due to positioning of the arms to evaluate for acute osseous  abnormality. Study is performed for assessment for pulmonary embolus. There is no evidence of pulmonary embolus.  Lungs are grossly clear, no focal consolidation seen. Focal area of scarring seen in the left upper lobe laterally and bilateral apical pleural thickening present. Moderate atherosclerotic plaque seen in the aorta. Mild diffuse centrilobular  emphysematous change.   No gross acute bony abnormality seen.         Impression:        1.  No evidence of PE.      2.  Stable scarring in the upper lobes and significant underlying emphysema.       Signer Name: Yumiko Lopez MD   Signed: 12/21/2022 8:03 PM   Workstation Name: [facility]    [facility]    XR Chest 1 View [[id number]] Collected: 12/21/22 1644     Updated: 12/21/22 1648    Narrative:      EXAM:    XR Chest, 1 View     EXAM DATE:    12/21/2022 4:17 PM     CLINICAL HISTORY:    CHF/COPD Protocol     TECHNIQUE:    Frontal view of the chest.     COMPARISON:    11/21/2021     FINDINGS:    Lungs:  Emphysema again noted.  Interstitial thickening may reflect  superimposed edema.      Pleural space:  No pneumothorax.    Heart:  Mild cardiac enlargement.    Mediastinum:  Unremarkable.    Bones/joints:  Stable appearance of bony structures.       Impression:        Interstitial thickening which may or reflect CHF/edema superimposed on  COPD.  No consolidative airspace disease.     This report was finalized on 12/21/2022 4:46 PM by Dr. Loki Kent MD.           ECG/EMG Results (last 24 hours)     Procedure Component Value Units Date/Time    ECG 12 Lead Dyspnea [[id number]] Collected: 12/21/22 1602     Updated: 12/21/22 1604     QT Interval 312 ms      QTC Interval 450 ms     Narrative:      Test Reason : Dyspnea  Blood Pressure :   */*   mmHG  Vent. Rate : 125 BPM     Atrial Rate : 125 BPM     P-R Int : 128 ms          QRS Dur :  68 ms      QT Int : 312 ms       P-R-T Axes :  90  91  85 degrees     QTc Int : 450 ms    ** Suspect arm lead reversal, interpretation assumes no reversal  Sinus tachycardia  Biatrial enlargement  Rightward axis  Pulmonary disease pattern  Nonspecific ST abnormality  Abnormal ECG  When compared with ECG of 21-NOV-2021 09:24,  ST now depressed in Inferior leads    Referred By: JERAMY           Confirmed By:     ECG 12 Lead Dyspnea [[id number]] Collected: 12/21/22 2044     Updated: 12/21/22 2046     QT Interval 398 ms      QTC Interval 481 ms     Narrative:      Test Reason : Dyspnea  Blood Pressure :   */*   mmHG  Vent. Rate :  88 BPM     Atrial Rate :  88 BPM     P-R Int : 110 ms          QRS Dur :  78 ms      QT Int : 398 ms       P-R-T Axes :  84  85  87 degrees      QTc Int : 481 ms    Sinus rhythm with short MN  Biatrial enlargement  Abnormal ECG  When compared with ECG of 21-DEC-2022 16:02, (Unconfirmed)  ST no longer depressed in Inferior leads    Referred By:            Confirmed By:           Physician Progress Notes (most recent note)    No notes of this type exist for this encounter.         Consult Notes (most recent note)    No notes of this type exist for this encounter.

## 2022-12-22 NOTE — CASE MANAGEMENT/SOCIAL WORK
Discharge Planning Assessment  [facility]     Patient Name: Florinda Barr  MRN: [id number]  Today's Date: 12/22/2022    Admit Date: 12/21/2022    Plan: CM spoke with pt via telephone as she is in FLU isolation. Pt lives alone in [facility] at [address] and plans to return home at d/c. She has O2@1.5L, portable tanks, nebulizer with solution via [employer], b/p cuff and pulse ox. She does not have HH and denies any needs. PCP is Tata RHODES and she gets rx filled at [facility] pharmacy. She has [employer] Medicare repl insurance and denies issues with copays. Pt's daughter will transport at d/c. CM will follow.   Discharge Needs Assessment     Row Name 12/22/22 0953       Living Environment    People in Home alone    Current Living Arrangements home    Primary Care Provided by self    Provides Primary Care For no one    Family Caregiver if Needed child(ivania), adult    Family Caregiver Names Daughter Tyra Barr [phone]    Quality of Family Relationships unable to assess    Able to Return to Prior Arrangements yes       Resource/Environmental Concerns    Resource/Environmental Concerns none    Transportation Concerns none       Transition Planning    Patient/Family Anticipates Transition to home    Patient/Family Anticipated Services at Transition none       Discharge Needs Assessment    Readmission Within the Last 30 Days no previous admission in last 30 days    Equipment Currently Used at Home oxygen;bp cuff;nebulizer;pulse ox  O2@1.5L, portable tanks and nebulizer via [employer].    Concerns to be Addressed no discharge needs identified;denies needs/concerns at this time    Anticipated Changes Related to Illness none    Equipment Needed After Discharge none    Patient's Choice of Community Agency(s) Pt does not have HH and denies any needs.               Discharge Plan     Row Name 12/22/22 0955       Plan    Plan CM spoke with pt via telephone as she is in FLU isolation. Pt lives alone in  [place] at [address] [place] and plans to return home at d/c. She has O2@1.5L, portable tanks, nebulizer with solution via [employer], b/p cuff and pulse ox. She does not have HH and denies any needs. PCP is Tata RHODES and she gets rx filled at [facility] pharmacy. She has [place] Medicare repl insurance and denies issues with copays. Pt's daughter will transport at d/c. CM will follow.    Patient/Family in Agreement with Plan yes    Plan Comments Sepsis, FLU A, COPD exac  12/22: Admit MS, FLU isolation, Tamiflu, O2@2L sat 95%, S/M IV, duonebs, IVF's, added COPD edu.              Expected Discharge Date and Time     Expected Discharge Date Expected Discharge Time    Dec 25, 2022         Tasia Henley RN

## 2022-12-22 NOTE — PROGRESS NOTES
Malnutrition Severity Assessment      Patient meets criteria for : Severe Malnutrition  Malnutrition Type (last 8 hours)     Malnutrition Severity Assessment     Row Name 12/22/22 1133       Malnutrition Severity Assessment    Malnutrition Type Chronic Disease - Related Malnutrition    Row Name 12/22/22 1133       Insufficient Energy Intake     Insufficient Energy Intake Findings Severe    Insufficient Energy Intake  < or equal to 50% of est. energy requirement for > or equal to 5d)    Row Name 12/22/22 1133       Unintentional Weight Loss     Unintentional Weight Loss Findings None    Row Name 12/22/22 1133       Muscle Loss    Loss of Muscle Mass Findings Severe    Faith Region Severe - deep hollowing/scooping, lack of muscle to touch, facial bones well defined    Clavicle Bone Region Severe - protruding prominent bone    Acromion Bone Region Severe - squared shoulders, bones, and acromion process protrusion prominent    Scapular Bone Region Severe - prominent bones, depressions easily visible between ribs, scapula, spine, shoulders    Dorsal Hand Region Severe - prominent depression    Patellar Region Severe - prominent bone, square looking, very little muscle definition    Anterior Thigh Region Severe - line/depression along thigh, obviously thin    Posterior Calf Region Severe - thin with very little definition/firmness    Row Name 12/22/22 1133       Fat Loss    Subcutaneous Fat Loss Findings Severe    Orbital Region  Severe - pronounced hollowness/depression, dark circles, loose saggy skin    Upper Arm Region Severe - mostly skin, very little space between folds, fingers touch    Thoracic & Lumbar Region Severe - ribs visible with prominent depressions, iliac crest very prominent    Row Name 12/22/22 1133       Criteria Met (Must meet criteria for severity in at least 2 of these categories: M Wasting, Fat Loss, Fluid, Secondary Signs, Wt. Status, Intake)    Patient meets criteria for  Severe Malnutrition

## 2022-12-22 NOTE — H&P
Hospitalist History and Physical        Patient Identification  Name: Florinda Barr  Age/Sex: 67 y.o. female  :  1955        MRN: [id number]  Visit Number: [id number]  Admit Date: 2022   PCP: Tata Varghese APRN          Chief complaint fever, chills, body aches, increased productive cough, short of breath    History of Present Illness:  Patient is a 67 y.o. female with history of COPD on 1L NC at home, HLD, PVD, and stroke, who presents with complaints of fever, chills, nasal drainage, sore throat and body aches over the last 2-3 days, accompanied by increased productive cough with clear sputum and increased shortness of breath. She denies chest pain or lower extremity edema. She tested positive for influenza A in the ED. She denies any recent sick contacts, stating her daughter and granddaughter have had the flu recently but she has not been around them. She did not receive the flu shot this year.     Review of Systems  Review of Systems   Constitutional: Positive for activity change, appetite change (states she has not eaten in 3 days), chills, fatigue and fever. Negative for diaphoresis and unexpected weight change.   HENT: Positive for congestion, postnasal drip, rhinorrhea and sore throat.    Eyes: Negative for photophobia, pain, discharge, redness, itching and visual disturbance.   Respiratory: Positive for cough and shortness of breath. Negative for wheezing.    Cardiovascular: Negative for chest pain, palpitations and leg swelling.   Gastrointestinal: Negative for abdominal distention, abdominal pain, constipation, diarrhea, nausea and vomiting.   Endocrine: Negative for cold intolerance, heat intolerance, polydipsia, polyphagia and polyuria.   Genitourinary: Negative for difficulty urinating, dysuria, flank pain, frequency and hematuria.   Musculoskeletal: Positive for myalgias. Negative for arthralgias, back pain, joint swelling, neck pain and neck stiffness.   Skin: Negative for  color change, pallor, rash and wound.   Neurological: Positive for weakness (generalized). Negative for dizziness, tremors, seizures, syncope, light-headedness, numbness and headaches.   Hematological: Negative for adenopathy. Does not bruise/bleed easily.   Psychiatric/Behavioral: Negative for agitation, behavioral problems and confusion.       History  Past Medical History:   Diagnosis Date   • COPD (chronic obstructive pulmonary disease) (HCC)    • Hyperlipidemia    • Peripheral vascular disease (HCC)    • Stroke (HCC)      Past Surgical History:   Procedure Laterality Date   • CARDIAC CATHETERIZATION Left 4/6/2021    Procedure: Peripheral angiography;  Surgeon: Dae Callahan MD;  Location: [facility] INVASIVE LOCATION;  Service: Cardiovascular;  Laterality: Left;     Family History   Problem Relation Age of Onset   • Heart disease Mother    • Hypertension Mother    • Diabetes Mother      Social History     Tobacco Use   • Smoking status: Every Day     Packs/day: 0.25     Years: 50.00     Pack years: 12.50     Types: Cigarettes   • Smokeless tobacco: Never   Substance Use Topics   • Alcohol use: No   • Drug use: No     (Not in a hospital admission)    Allergies:  Codeine, Latex, and Pletal [cilostazol]    Objective     Vital Signs  Temp:  [99.8 °F (37.7 °C)] 99.8 °F (37.7 °C)  Heart Rate:  [100-136] 100  Resp:  [24-32] 24  BP: (112-128)/(56-72) 112/56  Body mass index is 13.79 kg/m².    Physical Exam:  Physical Exam  Constitutional:       General: She is not in acute distress.     Appearance: She is ill-appearing.      Comments: Cachectic in appearance   HENT:      Right Ear: External ear normal.      Left Ear: External ear normal.      Nose: Nose normal.      Mouth/Throat:      Mouth: Mucous membranes are dry.      Pharynx: Oropharynx is clear.   Eyes:      Extraocular Movements: Extraocular movements intact.      Conjunctiva/sclera: Conjunctivae normal.      Pupils: Pupils are equal, round, and  reactive to light.   Cardiovascular:      Rate and Rhythm: Regular rhythm. Tachycardia present.      Pulses: Normal pulses.      Heart sounds: Normal heart sounds. No murmur heard.  Pulmonary:      Breath sounds: No wheezing, rhonchi or rales.      Comments: Mild tachypnea. Very poor air movement diffusely on auscultation of chest.   Abdominal:      General: Abdomen is flat. Bowel sounds are normal. There is no distension.      Palpations: Abdomen is soft.      Tenderness: There is no abdominal tenderness.   Musculoskeletal:         General: Normal range of motion.      Cervical back: Normal range of motion and neck supple. No tenderness.      Right lower leg: No edema.      Left lower leg: No edema.   Lymphadenopathy:      Cervical: No cervical adenopathy.   Skin:     General: Skin is warm and dry.      Capillary Refill: Capillary refill takes less than 2 seconds.      Findings: No bruising or lesion.   Neurological:      General: No focal deficit present.      Mental Status: She is alert and oriented to person, place, and time.   Psychiatric:         Mood and Affect: Mood normal.         Behavior: Behavior normal.         Thought Content: Thought content normal.         Judgment: Judgment normal.           Results Review:       Lab Results:  Results from last 7 days   Lab Units 12/21/22  1617   WBC 10*3/mm3 3.54   HEMOGLOBIN g/dL 14.5   PLATELETS 10*3/mm3 73*     Results from last 7 days   Lab Units 12/21/22  1617   CRP mg/dL 1.15*     Results from last 7 days   Lab Units 12/21/22  1617   SODIUM mmol/L 137   POTASSIUM mmol/L 3.9   CHLORIDE mmol/L 102   CO2 mmol/L 20.1*   BUN mg/dL 19   CREATININE mg/dL 0.66   CALCIUM mg/dL 8.6   GLUCOSE mg/dL 109*         No results found for: HGBA1C  Results from last 7 days   Lab Units 12/21/22  1617   BILIRUBIN mg/dL 0.6   ALK PHOS U/L 65   AST (SGOT) U/L 36*   ALT (SGPT) U/L 23     Results from last 7 days   Lab Units 12/21/22  1617   TROPONIN T ng/mL <0.010              Results from last 7 days   Lab Units 12/21/22  1611   PH, ARTERIAL pH units 7.402   PO2 ART mm Hg 64.1*   PCO2, ARTERIAL mm Hg 40.3   HCO3 ART mmol/L 25.0       I have reviewed the patient's laboratory results.    Imaging:  Imaging Results (Last 72 Hours)     Procedure Component Value Units Date/Time    CT Angiogram Chest Pulmonary Embolism [[id number]] Collected: 12/21/22 2003     Updated: 12/21/22 2005    Narrative:      PROCEDURE: CT CHEST PULMONARY EMBOLISM W CONTRAST    COMPARISON: CT chest low dose December 2021    INDICATIONS: Pulmonary embolism (PE) suspected, unknown D-dimer        TECHNIQUE:     CTA of the chest is performed with IV contrast. Multiplanar MIP and 3-D reconstructions  images are performed on separate workstation under concurrent radiologist supervision per protocol and reviewed.  Radiation dose reduction techniques included automated exposure control or exposure modulation based on body size. Count of known CT and cardiac nuc med studies performed in previous 12 months: 0.          FINDINGS: Exam is limited due to patient positioning with patient's arms within the gantry producing significant artifact. Assessment of these extraneous osseous structures is inadequate due to positioning of the arms to evaluate for acute osseous  abnormality. Study is performed for assessment for pulmonary embolus. There is no evidence of pulmonary embolus.  Lungs are grossly clear, no focal consolidation seen. Focal area of scarring seen in the left upper lobe laterally and bilateral apical pleural thickening present. Moderate atherosclerotic plaque seen in the aorta. Mild diffuse centrilobular  emphysematous change.   No gross acute bony abnormality seen.         Impression:        1.  No evidence of PE.      2.  Stable scarring in the upper lobes and significant underlying emphysema.       Signer Name: Yumiko Lopez MD   Signed: 12/21/2022 8:03 PM   [email or web address]    [facility]    XR Chest 1 View [[id number]] Collected: 22     Updated: 22    Narrative:      EXAM:    XR Chest, 1 View     EXAM DATE:    2022 4:17 PM     CLINICAL HISTORY:    CHF/COPD Protocol     TECHNIQUE:    Frontal view of the chest.     COMPARISON:    2021     FINDINGS:    Lungs:  Emphysema again noted.  Interstitial thickening may reflect  superimposed edema.      Pleural space:  No pneumothorax.    Heart:  Mild cardiac enlargement.    Mediastinum:  Unremarkable.    Bones/joints:  Stable appearance of bony structures.       Impression:        Interstitial thickening which may or reflect CHF/edema superimposed on  COPD.  No consolidative airspace disease.     This report was finalized on 2022 4:46 PM by Dr. Loki Kent MD.             I have personally reviewed the patient's radiologic imaging.        EKst-  Suspect arm lead reversal, interpretation assumes no reversal  Sinus tachycardia, , QTc 450  Biatrial enlargement  Rightward axis  Pulmonary disease pattern  Nonspecific ST abnormality  Abnormal ECG  When compared with ECG of 2021 09:24,  ST now depressed in Inferior leads    2nd-  Sinus rhythm with short MA, HR 88, QTc 481  Biatrial enlargement  Abnormal ECG  When compared with ECG of 21-DEC-2022 16:02, (Unconfirmed)  ST no longer depressed in Inferior leads    I have personally reviewed the patient's EKGs. Suspect ST depression in inferior leads was secondary to cardiac strain from tachycardia, resolved on repeat EKG now that HR better controlled        Assessment & Plan     - Sepsis, present on admission with tachycardia and tachypnea, secondary to influenza A infection causing acute COPD exacerbation: treat Influenza A with tamiflu. Continue treatment of COPD exacerbation with IV solu-medrol and scheduled nebs as she already feels much better after receiving these in the ED. No evidence of pneumonia on CXR or CT chest imaging and procal and  WBC are normal with CRP only marginally elevated, so will not give antibiotics at this time. Currently tolerating home 1L NC well with O2 sat in low 90s; titrate to keep O2 sat >90%.  - Thrombocytopenia, acute on chronic: appears to have chronically low platelets in the low 100-120s range dating back to 11/2021. Suspect acute drop today is related to underlying viral illness. Continue to monitor platelet count closely. Hold heparin products for DVT prophylaxis for now.  - History hyperlipidemia, PVD, stroke: review home meds once reconciled by pharmacy and resume as deemed appropriate.    DVT Prophylaxis: SCDs (avoid heparin products for now in light of acute on chronic thrombocytopenia)    Estimated Length of Stay >2 midnights    I discussed the patient's findings, assessment and plan with the patient and ED provider Ary Barfield PA-C.    * patient is high risk due to sepsis, influenza A virus, COPD exacerbation    Luis Salazar MD  12/21/22  20:55 EST

## 2022-12-22 NOTE — PAYOR COMM NOTE
CONTACT:  Amy Jeronimo RN    Utilization Management Dept.   [facility]  [address] [facility] [place] [address]    Phone:[phone]  Fax: [phone]    REQUEST FOR INPATIENT AUTHORIZATION  REF # [id number]  ICD 10: A41.9, j10.1  ATTENDING MD:  Ashu Chávez    NPI:[id number]  FACILITY NPI: [id number]  ADM DATE: 12/21/2022      Florinda Cota (67 y.o. Female)     Date of Birth   1955    Social Security Number       Address   [address] [facility] [address]    Home Phone   [phone]    MRN   [id number]       [facility]    Marital Status                               Admission Date   12/21/22    Admission Type   Emergency    Admitting Provider   Luis Salazar MD    Attending Provider   Ashu Chávez DO    Department, Room/Bed   [address], [facility]/       Discharge Date       Discharge Disposition       Discharge Destination                               Attending Provider: Ashu Chávez DO    Allergies: Codeine, Latex, Pletal [Cilostazol]    Isolation: Droplet   Infection: Influenza (12/21/22)   Code Status: CPR    Ht: 154.9 cm (61\")   Wt: 34.8 kg (76 lb 12.8 oz)    Admission Cmt: None   Principal Problem: Influenza A [J10.1]                 Active Insurance as of 12/21/2022     Primary Coverage     Payor Plan Insurance Group Employer/Plan Group    ANTHEM MEDICARE REPLACEMENT ANTHEM MEDICARE ADVANTAGE [id number]     Payor Plan Address Payor Plan Phone Number Payor Plan Fax Number Effective Dates    [address] [phone]  1/1/2022 - None Entered    [facility] [id number]       Subscriber Name Subscriber Birth Date Member ID       FLORINDA COTA 1955 [id number]           Secondary Coverage     Payor Plan Insurance Group Employer/Plan Group    [employer] [place] WELL[place]ARE MEDICAID      Payor Plan Address Payor Plan Phone Number Payor Plan Fax Number Effective Dates    [address] [address] [phone]  2/1/2016 - None  Entered    [facility] [id number]       Subscriber Name Subscriber Birth Date Member ID       FLORINDA COTA 1955 [id number]                 Emergency Contacts      (Rel.) Home Phone Work Phone Mobile Phone    Tyra Cota (Daughter) [phone] -- --               History & Physical      Luis Salazar MD at 22          Hospitalist History and Physical        Patient Identification  Name: Florinda Cota  Age/Sex: 67 y.o. female  :  1955        MRN: [id number]  Visit Number: [id number]  Admit Date: 2022   PCP: Tata Varghese APRN          Chief complaint fever, chills, body aches, increased productive cough, short of breath    History of Present Illness:  Patient is a 67 y.o. female with history of COPD on 1L NC at home, HLD, PVD, and stroke, who presents with complaints of fever, chills, nasal drainage, sore throat and body aches over the last 2-3 days, accompanied by increased productive cough with clear sputum and increased shortness of breath. She denies chest pain or lower extremity edema. She tested positive for influenza A in the ED. She denies any recent sick contacts, stating her daughter and granddaughter have had the flu recently but she has not been around them. She did not receive the flu shot this year.     Review of Systems  Review of Systems   Constitutional: Positive for activity change, appetite change (states she has not eaten in 3 days), chills, fatigue and fever. Negative for diaphoresis and unexpected weight change.   HENT: Positive for congestion, postnasal drip, rhinorrhea and sore throat.    Eyes: Negative for photophobia, pain, discharge, redness, itching and visual disturbance.   Respiratory: Positive for cough and shortness of breath. Negative for wheezing.    Cardiovascular: Negative for chest pain, palpitations and leg swelling.   Gastrointestinal: Negative for abdominal distention, abdominal pain, constipation, diarrhea, nausea and  vomiting.   Endocrine: Negative for cold intolerance, heat intolerance, polydipsia, polyphagia and polyuria.   Genitourinary: Negative for difficulty urinating, dysuria, flank pain, frequency and hematuria.   Musculoskeletal: Positive for myalgias. Negative for arthralgias, back pain, joint swelling, neck pain and neck stiffness.   Skin: Negative for color change, pallor, rash and wound.   Neurological: Positive for weakness (generalized). Negative for dizziness, tremors, seizures, syncope, light-headedness, numbness and headaches.   Hematological: Negative for adenopathy. Does not bruise/bleed easily.   Psychiatric/Behavioral: Negative for agitation, behavioral problems and confusion.       History  Past Medical History:   Diagnosis Date   • COPD (chronic obstructive pulmonary disease) (HCC)    • Hyperlipidemia    • Peripheral vascular disease (HCC)    • Stroke (HCC)      Past Surgical History:   Procedure Laterality Date   • CARDIAC CATHETERIZATION Left 4/6/2021    Procedure: Peripheral angiography;  Surgeon: Dae Callahan MD;  Location: [facility] INVASIVE LOCATION;  Service: Cardiovascular;  Laterality: Left;     Family History   Problem Relation Age of Onset   • Heart disease Mother    • Hypertension Mother    • Diabetes Mother      Social History     Tobacco Use   • Smoking status: Every Day     Packs/day: 0.25     Years: 50.00     Pack years: 12.50     Types: Cigarettes   • Smokeless tobacco: Never   Substance Use Topics   • Alcohol use: No   • Drug use: No     (Not in a hospital admission)    Allergies:  Codeine, Latex, and Pletal [cilostazol]    Objective      Vital Signs  Temp:  [99.8 °F (37.7 °C)] 99.8 °F (37.7 °C)  Heart Rate:  [100-136] 100  Resp:  [24-32] 24  BP: (112-128)/(56-72) 112/56  Body mass index is 13.79 kg/m².    Physical Exam:  Physical Exam  Constitutional:       General: She is not in acute distress.     Appearance: She is ill-appearing.      Comments: Cachectic in  appearance   HENT:      Right Ear: External ear normal.      Left Ear: External ear normal.      Nose: Nose normal.      Mouth/Throat:      Mouth: Mucous membranes are dry.      Pharynx: Oropharynx is clear.   Eyes:      Extraocular Movements: Extraocular movements intact.      Conjunctiva/sclera: Conjunctivae normal.      Pupils: Pupils are equal, round, and reactive to light.   Cardiovascular:      Rate and Rhythm: Regular rhythm. Tachycardia present.      Pulses: Normal pulses.      Heart sounds: Normal heart sounds. No murmur heard.  Pulmonary:      Breath sounds: No wheezing, rhonchi or rales.      Comments: Mild tachypnea. Very poor air movement diffusely on auscultation of chest.   Abdominal:      General: Abdomen is flat. Bowel sounds are normal. There is no distension.      Palpations: Abdomen is soft.      Tenderness: There is no abdominal tenderness.   Musculoskeletal:         General: Normal range of motion.      Cervical back: Normal range of motion and neck supple. No tenderness.      Right lower leg: No edema.      Left lower leg: No edema.   Lymphadenopathy:      Cervical: No cervical adenopathy.   Skin:     General: Skin is warm and dry.      Capillary Refill: Capillary refill takes less than 2 seconds.      Findings: No bruising or lesion.   Neurological:      General: No focal deficit present.      Mental Status: She is alert and oriented to person, place, and time.   Psychiatric:         Mood and Affect: Mood normal.         Behavior: Behavior normal.         Thought Content: Thought content normal.         Judgment: Judgment normal.           Results Review:       Lab Results:  Results from last 7 days   Lab Units 12/21/22  1617   WBC 10*3/mm3 3.54   HEMOGLOBIN g/dL 14.5   PLATELETS 10*3/mm3 73*     Results from last 7 days   Lab Units 12/21/22  1617   CRP mg/dL 1.15*     Results from last 7 days   Lab Units 12/21/22  1617   SODIUM mmol/L 137   POTASSIUM mmol/L 3.9   CHLORIDE mmol/L 102   CO2  mmol/L 20.1*   BUN mg/dL 19   CREATININE mg/dL 0.66   CALCIUM mg/dL 8.6   GLUCOSE mg/dL 109*         No results found for: HGBA1C  Results from last 7 days   Lab Units 12/21/22  1617   BILIRUBIN mg/dL 0.6   ALK PHOS U/L 65   AST (SGOT) U/L 36*   ALT (SGPT) U/L 23     Results from last 7 days   Lab Units 12/21/22  1617   TROPONIN T ng/mL <0.010             Results from last 7 days   Lab Units 12/21/22  1611   PH, ARTERIAL pH units 7.402   PO2 ART mm Hg 64.1*   PCO2, ARTERIAL mm Hg 40.3   HCO3 ART mmol/L 25.0       I have reviewed the patient's laboratory results.    Imaging:  Imaging Results (Last 72 Hours)     Procedure Component Value Units Date/Time    CT Angiogram Chest Pulmonary Embolism [[id number]] Collected: 12/21/22 2003     Updated: 12/21/22 2005    Narrative:      PROCEDURE: CT CHEST PULMONARY EMBOLISM W CONTRAST    COMPARISON: CT chest low dose December 2021    INDICATIONS: Pulmonary embolism (PE) suspected, unknown D-dimer        TECHNIQUE:     CTA of the chest is performed with IV contrast. Multiplanar MIP and 3-D reconstructions  images are performed on separate workstation under concurrent radiologist supervision per protocol and reviewed.  Radiation dose reduction techniques included automated exposure control or exposure modulation based on body size. Count of known CT and cardiac nuc med studies performed in previous 12 months: 0.          FINDINGS: Exam is limited due to patient positioning with patient's arms within the gantry producing significant artifact. Assessment of these extraneous osseous structures is inadequate due to positioning of the arms to evaluate for acute osseous  abnormality. Study is performed for assessment for pulmonary embolus. There is no evidence of pulmonary embolus.  Lungs are grossly clear, no focal consolidation seen. Focal area of scarring seen in the left upper lobe laterally and bilateral apical pleural thickening present. Moderate atherosclerotic plaque seen in the  aorta. Mild diffuse centrilobular  emphysematous change.   No gross acute bony abnormality seen.         Impression:        1.  No evidence of PE.      2.  Stable scarring in the upper lobes and significant underlying emphysema.       Signer Name: Yumiko Lopez MD   Signed: 2022 8:03 PM   Workstation Name: [id number]    [facility]    XR Chest 1 View [[id number]] Collected: 22     Updated: 22    Narrative:      EXAM:    XR Chest, 1 View     EXAM DATE:    2022 4:17 PM     CLINICAL HISTORY:    CHF/COPD Protocol     TECHNIQUE:    Frontal view of the chest.     COMPARISON:    2021     FINDINGS:    Lungs:  Emphysema again noted.  Interstitial thickening may reflect  superimposed edema.      Pleural space:  No pneumothorax.    Heart:  Mild cardiac enlargement.    Mediastinum:  Unremarkable.    Bones/joints:  Stable appearance of bony structures.       Impression:        Interstitial thickening which may or reflect CHF/edema superimposed on  COPD.  No consolidative airspace disease.     This report was finalized on 2022 4:46 PM by Dr. Loki Kent MD.             I have personally reviewed the patient's radiologic imaging.        EKst-  Suspect arm lead reversal, interpretation assumes no reversal  Sinus tachycardia, , QTc 450  Biatrial enlargement  Rightward axis  Pulmonary disease pattern  Nonspecific ST abnormality  Abnormal ECG  When compared with ECG of 2021 09:24,  ST now depressed in Inferior leads    2nd-  Sinus rhythm with short LA, HR 88, QTc 481  Biatrial enlargement  Abnormal ECG  When compared with ECG of 21-DEC-2022 16:02, (Unconfirmed)  ST no longer depressed in Inferior leads    I have personally reviewed the patient's EKGs. Suspect ST depression in inferior leads was secondary to cardiac strain from tachycardia, resolved on repeat EKG now that HR better controlled        Assessment & Plan     - Sepsis, present on  admission with tachycardia and tachypnea, secondary to influenza A infection causing acute COPD exacerbation: treat Influenza A with tamiflu. Continue treatment of COPD exacerbation with IV solu-medrol and scheduled nebs as she already feels much better after receiving these in the ED. No evidence of pneumonia on CXR or CT chest imaging and procal and WBC are normal with CRP only marginally elevated, so will not give antibiotics at this time. Currently tolerating home 1L NC well with O2 sat in low 90s; titrate to keep O2 sat >90%.  - Thrombocytopenia, acute on chronic: appears to have chronically low platelets in the low 100-120s range dating back to 11/2021. Suspect acute drop today is related to underlying viral illness. Continue to monitor platelet count closely. Hold heparin products for DVT prophylaxis for now.  - History hyperlipidemia, PVD, stroke: review home meds once reconciled by pharmacy and resume as deemed appropriate.    DVT Prophylaxis: SCDs (avoid heparin products for now in light of acute on chronic thrombocytopenia)    Estimated Length of Stay >2 midnights    I discussed the patient's findings, assessment and plan with the patient and ED provider Ary Barfield PA-C.    * patient is high risk due to sepsis, influenza A virus, COPD exacerbation    Luis Salazar MD  12/21/22  20:55 EST      Electronically signed by Luis Salazar MD at 12/21/22 2110          Emergency Department Notes      Ary Barfield PA-C at 12/21/22 1607          Subjective   History of Present Illness  Patient is a 67-year-old female with COPD, hyperlipidemia, peripheral vascular disease, and history of strokes presents to the ED with complaints of cough, congestion, body aches, fever, chills, and worsening shortness of breath.  She states she wears a liter of oxygen daily.  She states she has been continuing to wear this and she feels having worsening shortness of breath over the last 2 days.  She denies  associated chest pain, nausea, vomiting, headache, dizziness, abdominal pain, dysuria, diarrhea, or constipation.  She does smoke approximately half a pack a day.    History provided by:  Patient   used: No        Review of Systems   Constitutional: Positive for chills and fever. Negative for diaphoresis and fatigue.   HENT: Positive for congestion. Negative for ear discharge, ear pain, facial swelling, hearing loss, postnasal drip, rhinorrhea, sinus pressure, sinus pain, sneezing, sore throat, tinnitus and trouble swallowing.    Eyes: Negative.  Negative for photophobia, pain, discharge, redness and itching.   Respiratory: Positive for cough, shortness of breath and wheezing.    Cardiovascular: Negative.  Negative for chest pain.   Gastrointestinal: Negative.  Negative for abdominal distention, abdominal pain, constipation, diarrhea, nausea and vomiting.   Endocrine: Negative.    Genitourinary: Negative.  Negative for dysuria.   Musculoskeletal: Positive for myalgias. Negative for arthralgias, back pain, gait problem, joint swelling, neck pain and neck stiffness.   Skin: Negative.    Neurological: Negative.  Negative for dizziness, tremors, seizures, syncope, facial asymmetry, speech difficulty, weakness, light-headedness, numbness and headaches.   Psychiatric/Behavioral: Negative.  Negative for confusion.   All other systems reviewed and are negative.      Past Medical History:   Diagnosis Date   • COPD (chronic obstructive pulmonary disease) (HCC)    • Hyperlipidemia    • Peripheral vascular disease (HCC)    • Stroke (HCC)        Allergies   Allergen Reactions   • Codeine Swelling   • Latex Hives   • Pletal [Cilostazol] Rash     Welps       Past Surgical History:   Procedure Laterality Date   • CARDIAC CATHETERIZATION Left 4/6/2021    Procedure: Peripheral angiography;  Surgeon: Dae Callahan MD;  Location: [facility] INVASIVE LOCATION;  Service: Cardiovascular;  Laterality:  Left;       Family History   Problem Relation Age of Onset   • Heart disease Mother    • Hypertension Mother    • Diabetes Mother        Social History     Socioeconomic History   • Marital status:    • Number of children: 3   Tobacco Use   • Smoking status: Every Day     Packs/day: 0.25     Years: 50.00     Pack years: 12.50     Types: Cigarettes   • Smokeless tobacco: Never   Substance and Sexual Activity   • Alcohol use: No   • Drug use: No   • Sexual activity: Defer           Objective   Physical Exam  Vitals and nursing note reviewed.   Constitutional:       General: She is not in acute distress.     Appearance: She is well-developed. She is not diaphoretic.   HENT:      Head: Normocephalic and atraumatic.      Right Ear: External ear normal.      Left Ear: External ear normal.      Nose: Congestion present.      Mouth/Throat:      Mouth: Mucous membranes are moist.      Pharynx: Oropharynx is clear. Posterior oropharyngeal erythema present. No oropharyngeal exudate.   Eyes:      Extraocular Movements: Extraocular movements intact.      Conjunctiva/sclera: Conjunctivae normal.      Pupils: Pupils are equal, round, and reactive to light.   Neck:      Vascular: No JVD.      Trachea: No tracheal deviation.   Cardiovascular:      Rate and Rhythm: Normal rate and regular rhythm.      Pulses: Normal pulses.      Heart sounds: Normal heart sounds. No murmur heard.  Pulmonary:      Effort: Pulmonary effort is normal. Tachypnea present. No accessory muscle usage or respiratory distress.      Breath sounds: No stridor. Wheezing present.   Abdominal:      General: Bowel sounds are normal. There is no distension.      Palpations: Abdomen is soft.      Tenderness: There is no abdominal tenderness. There is no guarding or rebound.   Musculoskeletal:         General: No deformity. Normal range of motion.      Cervical back: Normal range of motion and neck supple.      Right lower leg: No edema.      Left lower leg: No  edema.   Skin:     General: Skin is warm and dry.      Coloration: Skin is not pale.      Findings: No erythema or rash.   Neurological:      General: No focal deficit present.      Mental Status: She is alert and oriented to person, place, and time.      Cranial Nerves: No cranial nerve deficit.   Psychiatric:         Mood and Affect: Mood normal.         Behavior: Behavior normal.         Thought Content: Thought content normal.         Procedures          ED Course  ED Course as of 12/21/22 2156   Wed Dec 21, 2022   1602 ECG 12 Lead Dyspnea  Sinus tachycardia ventricular rate 125,  QRS 68   Biatrial enlargement with pulmonary disease pattern. Right axis deviation;  nonspecific ST segment repolarization abnormalities with NO st elevation  Electronically signed by Sophia Galicia DO, 12/21/22, 4:09 PM EST. [LK]   1805 XR Chest 1 View  IMPRESSION:    Interstitial thickening which may or reflect CHF/edema superimposed on  COPD.  No consolidative airspace disease.     This report was finalized on 12/21/2022 4:46 PM by Dr. Loki Kent MD. []   2020 CT Angiogram Chest Pulmonary Embolism  IMPRESSION:     1.  No evidence of PE.       2.  Stable scarring in the upper lobes and significant underlying emphysema.        Signer Name: Yumiko Lopez MD   Signed: 12/21/2022 8:03 PM [MH]   2039 Discussed with Dr. Salazar, hospitalist, he accepts patient is agreeable for admission. []   2050 EKG at 2244 sinus rhythm 88 bpm, , cures 78, QTc 41, regular axis, no significant ST deviation, no STEMI. [KP]      ED Course User Index  [KP] Loki Anand MD  [LK] Sophia Galicia DO  [] Ary Barfield PA-C                                           [facility]    Final diagnoses:   Influenza A       ED Disposition  ED Disposition     ED Disposition   Decision to Admit    Condition   --    Comment   Level of Care: Med/Surg [1]   Diagnosis: Influenza A [897281]   Admitting Physician: DAYANA SALAZAR [[id number]]    Attending Physician: DAYANA ENRIQUEZ [[id number]]   Certification: I Certify That Inpatient Hospital Services Are Medically Necessary For Greater Than 2 Midnights               No follow-up provider specified.       Medication List      No changes were made to your prescriptions during this visit.          Ary Barfield PA-C  12/21/22 2156      Electronically signed by Ary Barfield PA-C at 12/21/22 2156     Melly Novak at 12/21/22 2051        Ekg performed by tech at 2054 given to Dr. Anand     Electronically signed by Melly Novak at 12/21/22 2051     Jessica Ramirez, RN at 12/21/22 2146        Report given to Gilma NATHAN    Electronically signed by Jessica Ramirez, RN at 12/21/22 2146       Vital Signs (last day)     Date/Time Temp Temp src Pulse Resp BP Patient Position SpO2    12/22/22 0646 -- -- 80 20 -- -- 97    12/22/22 0636 -- -- 83 20 -- -- 95    12/22/22 0600 98.3 (36.8) Oral 65 18 106/51 Lying 96    12/22/22 0300 97.9 (36.6) Oral 97 18 98/58 Lying 95    12/22/22 0043 -- -- 106 18 -- -- 94    12/21/22 2238 98.4 (36.9) Oral 103 20 113/54 Lying 94    12/21/22 2137 -- -- 109 -- 101/56 -- 91    12/21/22 2121 -- -- 92 -- 94/45 -- 91    12/21/22 2059 -- -- 102 25 -- -- --    12/21/22 2052 -- -- 100 24 107/56 -- 92    12/21/22 2021 -- -- 104 -- 112/56 -- 92    12/21/22 2017 -- -- -- -- 128/59 -- --    12/21/22 1818 -- -- 115 30 -- -- 92    12/21/22 1810 -- -- 100 25 -- -- 92    12/21/22 1619 -- -- 122 30 -- -- 94    12/21/22 1611 -- -- 131 32 -- -- 90    12/21/22 1556 -- -- 133 -- 113/72 -- 90    12/21/22 1551 99.8 (37.7) Oral 136 26 125/64 Sitting 92    12/21/22 1549 -- -- 136 -- 125/64 -- 92            Current Facility-Administered Medications   Medication Dose Route Frequency Provider Last Rate Last Admin   • acetaminophen (TYLENOL) tablet 650 mg  650 mg Oral Q6H PRN Stephanie Montoya PA-C   650 mg at 12/21/22 8101   • budesonide-formoterol (SYMBICORT) 160-4.5 MCG/ACT inhaler 2 puff  2  puff Inhalation BID - RT Luis Salazar MD   2 puff at 12/22/22 0636   • ipratropium (ATROVENT) nebulizer solution 0.5 mg  0.5 mg Nebulization Q6H PRN Luis Salazar MD       • ipratropium-albuterol (DUO-NEB) nebulizer solution 3 mL  3 mL Nebulization Q6H - RT Luis Salazar MD   3 mL at 12/22/22 0636   • methylPREDNISolone sodium succinate (SOLU-Medrol) injection 40 mg  40 mg Intravenous Q12H Luis Salazar MD       • oseltamivir (TAMIFLU) capsule 30 mg  30 mg Oral Q12H Luis Salazar MD       • sodium chloride 0.9 % flush 10 mL  10 mL Intravenous PRN Luis Salazar MD       • sodium chloride 0.9 % flush 10 mL  10 mL Intravenous Q12H Luis Salazar MD   10 mL at 12/21/22 2233   • sodium chloride 0.9 % flush 10 mL  10 mL Intravenous PRN Luis Salazar MD       • sodium chloride 0.9 % infusion 40 mL  40 mL Intravenous PRN Luis Salazar MD       • sodium chloride 0.9 % infusion  100 mL/hr Intravenous Continuous Luis Salazar  mL/hr at 12/21/22 2300 100 mL/hr at 12/21/22 2300       Lab Results (last 24 hours)     Procedure Component Value Units Date/Time    Comprehensive Metabolic Panel [[id number]]  (Abnormal) Collected: 12/22/22 0304    Specimen: Blood Updated: 12/22/22 0409     Glucose 163 mg/dL      BUN 15 mg/dL      Creatinine 0.56 mg/dL      Sodium 139 mmol/L      Potassium 3.6 mmol/L      Comment: Slight hemolysis detected by analyzer. Results may be affected.        Chloride 107 mmol/L      CO2 21.1 mmol/L      Calcium 7.9 mg/dL      Total Protein 5.6 g/dL      Albumin 3.08 g/dL      ALT (SGPT) 19 U/L      AST (SGOT) 35 U/L      Alkaline Phosphatase 44 U/L      Total Bilirubin 0.3 mg/dL      Globulin 2.5 gm/dL      A/G Ratio 1.2 g/dL      BUN/Creatinine Ratio 26.8     Anion Gap 10.9 mmol/L      eGFR 99.6 mL/min/1.73      Comment: National Kidney Foundation and American Society of Nephrology (ASN) Task Force recommended  calculation based on the Chronic Kidney Disease Epidemiology Collaboration (CKD-EPI) equation refit without adjustment for race.       Narrative:      GFR Normal >60  Chronic Kidney Disease <60  Kidney Failure <15      CBC Auto Differential [[id number]]  (Abnormal) Collected: 12/22/22 0304    Specimen: Blood Updated: 12/22/22 0351     WBC 3.88 10*3/mm3      RBC 3.49 10*6/mm3      Hemoglobin 12.4 g/dL      Hematocrit 38.3 %      .7 fL      MCH 35.5 pg      MCHC 32.4 g/dL      RDW 13.3 %      RDW-SD 54.6 fl      MPV 11.1 fL      Platelets 72 10*3/mm3      Neutrophil % 90.7 %      Lymphocyte % 5.4 %      Monocyte % 3.6 %      Eosinophil % 0.0 %      Basophil % 0.0 %      Immature Grans % 0.3 %      Neutrophils, Absolute 3.52 10*3/mm3      Lymphocytes, Absolute 0.21 10*3/mm3      Monocytes, Absolute 0.14 10*3/mm3      Eosinophils, Absolute 0.00 10*3/mm3      Basophils, Absolute 0.00 10*3/mm3      Immature Grans, Absolute 0.01 10*3/mm3      nRBC 0.0 /100 WBC     Urinalysis, Microscopic Only - Urine, Clean Catch [[id number]]  (Abnormal) Collected: 12/21/22 2015    Specimen: Urine, Clean Catch Updated: 12/21/22 2036     RBC, UA 3-5 /HPF      WBC, UA 0-2 /HPF      Bacteria, UA Trace /HPF      Squamous Epithelial Cells, UA 3-6 /HPF      Hyaline Casts, UA 0-2 /LPF      Methodology Manual Light Microscopy    Urinalysis With Microscopic If Indicated (No Culture) - Urine, Clean Catch [[id number]]  (Abnormal) Collected: 12/21/22 2015    Specimen: Urine, Clean Catch Updated: 12/21/22 2031     Color, UA Yellow     Appearance, UA Clear     pH, UA 5.5     Specific Gravity, UA >1.030     Glucose, UA Negative     Ketones, UA 15 mg/dL (1+)     Bilirubin, UA Negative     Blood, UA Small (1+)     Protein, UA 30 mg/dL (1+)     Leuk Esterase, UA Negative     Nitrite, UA Negative     Urobilinogen, UA 1.0 E.U./dL    [place] Draw [[id number]] Collected: 12/21/22 1617    Specimen: Blood from Arm, Right Updated: 12/21/22 8234    Narrative:       The following orders were created for panel order [place] Draw.  Procedure                               Abnormality         Status                     ---------                               -----------         ------                     Green Top (Gel)[[id number]]                                  Final result               Lavender Top[[id number]]                                                                Gold Top - SST[[id number]]                                   Final result               Light Blue Top[[id number]]                                   Final result                 Please view results for these tests on the individual orders.    Green Top (Gel) [[id number]] Collected: 12/21/22 1617    Specimen: Blood from Arm, Right Updated: 12/21/22 1731     Extra Tube Hold for add-ons.     Comment: Auto resulted.       Gold Top - SST [[id number]] Collected: 12/21/22 1617    Specimen: Blood from Arm, Right Updated: 12/21/22 1731     Extra Tube Hold for add-ons.     Comment: Auto resulted.       Light Blue Top [[id number]] Collected: 12/21/22 1617    Specimen: Blood from Arm, Right Updated: 12/21/22 1731     Extra Tube Hold for add-ons.     Comment: Auto resulted       Procalcitonin [[id number]]  (Normal) Collected: 12/21/22 1617    Specimen: Blood from Arm, Right Updated: 12/21/22 1700     Procalcitonin 0.13 ng/mL     Narrative:      As a Marker for Sepsis (Non-Neonates):    1. <0.5 ng/mL represents a low risk of severe sepsis and/or septic shock.  2. >2 ng/mL represents a high risk of severe sepsis and/or septic shock.    As a Marker for Lower Respiratory Tract Infections that require antibiotic therapy:    PCT on Admission    Antibiotic Therapy       6-12 Hrs later    >0.5                Strongly Recommended  >0.25 - <0.5        Recommended   0.1 - 0.25          Discouraged              Remeasure/reassess PCT  <0.1                Strongly Discouraged     Remeasure/reassess PCT    As 28 day mortality risk marker:  \"Change in Procalcitonin Result\" (>80% or <=80%) if Day 0 (or Day 1) and Day 4 values are available. Refer to http://www.[facility]-pct-calculator.com    Change in PCT <=80%  A decrease of PCT levels below or equal to 80% defines a positive change in PCT test result representing a higher risk for 28-day all-cause mortality of patients diagnosed with severe sepsis for septic shock.    Change in PCT >80%  A decrease of PCT levels of more than 80% defines a negative change in PCT result representing a lower risk for 28-day all-cause mortality of patients diagnosed with severe sepsis or septic shock.       BNP [[id number]]  (Normal) Collected: 12/21/22 1617    Specimen: Blood from Arm, Right Updated: 12/21/22 1657     proBNP 256.7 pg/mL     Narrative:      Among patients with dyspnea, NT-proBNP is highly sensitive for the detection of acute congestive heart failure. In addition NT-proBNP of <300 pg/ml effectively rules out acute congestive heart failure with 99% negative predictive value.      Troponin [[id number]]  (Normal) Collected: 12/21/22 1617    Specimen: Blood from Arm, Right Updated: 12/21/22 1657     Troponin T <0.010 ng/mL     Narrative:      Troponin T Reference Range:  <= 0.03 ng/mL-   Negative for AMI  >0.03 ng/mL-     Abnormal for myocardial necrosis.  Clinicians would have to utilize clinical acumen, EKG, Troponin and serial changes to determine if it is an Acute Myocardial Infarction or myocardial injury due to an underlying chronic condition.       Results may be falsely decreased if patient taking Biotin.      Comprehensive Metabolic Panel [[id number]]  (Abnormal) Collected: 12/21/22 1617    Specimen: Blood from Arm, Right Updated: 12/21/22 1653     Glucose 109 mg/dL      BUN 19 mg/dL      Creatinine 0.66 mg/dL      Sodium 137 mmol/L      Potassium 3.9 mmol/L      Comment: Slight hemolysis detected by analyzer. Results may be affected.        Chloride 102 mmol/L      CO2 20.1 mmol/L      Calcium 8.6 mg/dL       Total Protein 6.8 g/dL      Albumin 3.75 g/dL      ALT (SGPT) 23 U/L      AST (SGOT) 36 U/L      Alkaline Phosphatase 65 U/L      Total Bilirubin 0.6 mg/dL      Globulin 3.1 gm/dL      A/G Ratio 1.2 g/dL      BUN/Creatinine Ratio 28.8     Anion Gap 14.9 mmol/L      eGFR 95.7 mL/min/1.73      Comment: National Kidney Foundation and American Society of Nephrology (ASN) Task Force recommended calculation based on the Chronic Kidney Disease Epidemiology Collaboration (CKD-EPI) equation refit without adjustment for race.       Narrative:      GFR Normal >60  Chronic Kidney Disease <60  Kidney Failure <15      C-reactive Protein [[id number]]  (Abnormal) Collected: 12/21/22 1617    Specimen: Blood from Arm, Right Updated: 12/21/22 1653     C-Reactive Protein 1.15 mg/dL     COVID-19 and FLU A/B PCR - Swab, Nasopharynx [[id number]]  (Abnormal) Collected: 12/21/22 1617    Specimen: Swab from Nasopharynx Updated: 12/21/22 1649     COVID19 Not Detected     Influenza A PCR Detected     Influenza B PCR Not Detected    Narrative:      Fact sheet for providers: https://www.fda.gov/media/263423/download    Fact sheet for patients: https://www.fda.gov/media/222653/download    Test performed by PCR.    Lactic Acid, Plasma [[id number]]  (Normal) Collected: 12/21/22 1617    Specimen: Blood from Arm, Right Updated: 12/21/22 1648     Lactate 1.6 mmol/L     CBC & Differential [[id number]]  (Abnormal) Collected: 12/21/22 1617    Specimen: Blood from Arm, Right Updated: 12/21/22 1629    Narrative:      The following orders were created for panel order CBC & Differential.  Procedure                               Abnormality         Status                     ---------                               -----------         ------                     CBC Auto Differential[[id number]]        Abnormal            Final result               Scan Slide[[id number]]                                                                    Please view results for  these tests on the individual orders.    CBC Auto Differential [[id number]]  (Abnormal) Collected: 12/21/22 1617    Specimen: Blood from Arm, Right Updated: 12/21/22 1629     WBC 3.54 10*3/mm3      RBC 4.08 10*6/mm3      Hemoglobin 14.5 g/dL      Hematocrit 42.5 %      .2 fL      MCH 35.5 pg      MCHC 34.1 g/dL      RDW 13.1 %      RDW-SD 49.9 fl      MPV 10.6 fL      Platelets 73 10*3/mm3      Neutrophil % 82.8 %      Lymphocyte % 11.0 %      Monocyte % 5.6 %      Eosinophil % 0.0 %      Basophil % 0.3 %      Immature Grans % 0.3 %      Neutrophils, Absolute 2.93 10*3/mm3      Lymphocytes, Absolute 0.39 10*3/mm3      Monocytes, Absolute 0.20 10*3/mm3      Eosinophils, Absolute 0.00 10*3/mm3      Basophils, Absolute 0.01 10*3/mm3      Immature Grans, Absolute 0.01 10*3/mm3      nRBC 0.0 /100 WBC     Blood Culture - Blood, Arm, Right [[id number]] Collected: 12/21/22 1617    Specimen: Blood from Arm, Right Updated: 12/21/22 1627    Blood Culture - Blood, Arm, Left [[id number]] Collected: 12/21/22 1617    Specimen: Blood from Arm, Left Updated: 12/21/22 1627    Blood Gas, Arterial With Co-Ox [[id number]]  (Abnormal) Collected: 12/21/22 1611    Specimen: Arterial Blood Updated: 12/21/22 1616     Site Left Radial     Norman's Test Positive     pH, Arterial 7.402 pH units      pCO2, Arterial 40.3 mm Hg      pO2, Arterial 64.1 mm Hg      Comment: 84 Value below reference range        HCO3, Arterial 25.0 mmol/L      Base Excess, Arterial 0.2 mmol/L      O2 Saturation, Arterial 93.3 %      Comment: 84 Value below reference range        Hemoglobin, Blood Gas 15.0 g/dL      Hematocrit, Blood Gas 45.9 %      Oxyhemoglobin 91.5 %      Comment: 84 Value below reference range        Methemoglobin 0.40 %      Carboxyhemoglobin 1.5 %      A-a DO2 54.7 mmHg      CO2 Content 26.3 mmol/L      Temperature 0.0 C      Barometric Pressure for Blood Gas 731 mmHg      Modality Nasal Cannula     FIO2 24 %      Flow Rate 1.0 lpm       Ventilator Mode NA     Note --     Collected by [id number]     Comment: Meter: [id number]     :  [id number]        pH, Temp Corrected --     pCO2, Temperature Corrected --     pO2, Temperature Corrected --        Imaging Results (Last 24 Hours)     Procedure Component Value Units Date/Time    CT Angiogram Chest Pulmonary Embolism [[id number]] Collected: 12/21/22 2003     Updated: 12/21/22 2005    Narrative:      PROCEDURE: CT CHEST PULMONARY EMBOLISM W CONTRAST    COMPARISON: CT chest low dose December 2021    INDICATIONS: Pulmonary embolism (PE) suspected, unknown D-dimer        TECHNIQUE:     CTA of the chest is performed with IV contrast. Multiplanar MIP and 3-D reconstructions  images are performed on separate workstation under concurrent radiologist supervision per protocol and reviewed.  Radiation dose reduction techniques included automated exposure control or exposure modulation based on body size. Count of known CT and cardiac nuc med studies performed in previous 12 months: 0.          FINDINGS: Exam is limited due to patient positioning with patient's arms within the gantry producing significant artifact. Assessment of these extraneous osseous structures is inadequate due to positioning of the arms to evaluate for acute osseous  abnormality. Study is performed for assessment for pulmonary embolus. There is no evidence of pulmonary embolus.  Lungs are grossly clear, no focal consolidation seen. Focal area of scarring seen in the left upper lobe laterally and bilateral apical pleural thickening present. Moderate atherosclerotic plaque seen in the aorta. Mild diffuse centrilobular  emphysematous change.   No gross acute bony abnormality seen.         Impression:        1.  No evidence of PE.      2.  Stable scarring in the upper lobes and significant underlying emphysema.       Signer Name: Yumiko Lopez MD   Signed: 12/21/2022 8:03 PM   Workstation Name: [facility]    [facility]  [place]    XR Chest 1 View [[id number]] Collected: 12/21/22 1644     Updated: 12/21/22 1648    Narrative:      EXAM:    XR Chest, 1 View     EXAM DATE:    12/21/2022 4:17 PM     CLINICAL HISTORY:    CHF/COPD Protocol     TECHNIQUE:    Frontal view of the chest.     COMPARISON:    11/21/2021     FINDINGS:    Lungs:  Emphysema again noted.  Interstitial thickening may reflect  superimposed edema.      Pleural space:  No pneumothorax.    Heart:  Mild cardiac enlargement.    Mediastinum:  Unremarkable.    Bones/joints:  Stable appearance of bony structures.       Impression:        Interstitial thickening which may or reflect CHF/edema superimposed on  COPD.  No consolidative airspace disease.     This report was finalized on 12/21/2022 4:46 PM by Dr. Loki Kent MD.           ECG/EMG Results (last 24 hours)     Procedure Component Value Units Date/Time    ECG 12 Lead Dyspnea [[id number]] Collected: 12/21/22 1602     Updated: 12/21/22 1604     QT Interval 312 ms      QTC Interval 450 ms     Narrative:      Test Reason : Dyspnea  Blood Pressure :   */*   mmHG  Vent. Rate : 125 BPM     Atrial Rate : 125 BPM     P-R Int : 128 ms          QRS Dur :  68 ms      QT Int : 312 ms       P-R-T Axes :  90  91  85 degrees     QTc Int : 450 ms    ** Suspect arm lead reversal, interpretation assumes no reversal  Sinus tachycardia  Biatrial enlargement  Rightward axis  Pulmonary disease pattern  Nonspecific ST abnormality  Abnormal ECG  When compared with ECG of 21-NOV-2021 09:24,  ST now depressed in Inferior leads    Referred By: JERAMY           Confirmed By:     ECG 12 Lead Dyspnea [[id number]] Collected: 12/21/22 2044     Updated: 12/21/22 2046     QT Interval 398 ms      QTC Interval 481 ms     Narrative:      Test Reason : Dyspnea  Blood Pressure :   */*   mmHG  Vent. Rate :  88 BPM     Atrial Rate :  88 BPM     P-R Int : 110 ms          QRS Dur :  78 ms      QT Int : 398 ms       P-R-T Axes :  84  85  87 degrees      QTc Int : 481 ms    Sinus rhythm with short CA  Biatrial enlargement  Abnormal ECG  When compared with ECG of 21-DEC-2022 16:02, (Unconfirmed)  ST no longer depressed in Inferior leads    Referred By:            Confirmed By:           Physician Progress Notes (most recent note)    No notes of this type exist for this encounter.         Consult Notes (most recent note)    No notes of this type exist for this encounter.

## 2022-12-23 LAB
ALBUMIN SERPL-MCNC: 3.29 G/DL (ref 3.5–5.2)
ALBUMIN/GLOB SERPL: 1.3 G/DL
ALP SERPL-CCNC: 50 U/L (ref 39–117)
ALT SERPL W P-5'-P-CCNC: 17 U/L (ref 1–33)
ANION GAP SERPL CALCULATED.3IONS-SCNC: 6.9 MMOL/L (ref 5–15)
AST SERPL-CCNC: 27 U/L (ref 1–32)
BASOPHILS # BLD AUTO: 0 10*3/MM3 (ref 0–0.2)
BASOPHILS NFR BLD AUTO: 0 % (ref 0–1.5)
BILIRUB SERPL-MCNC: 0.3 MG/DL (ref 0–1.2)
BUN SERPL-MCNC: 15 MG/DL (ref 8–23)
BUN/CREAT SERPL: 34.1 (ref 7–25)
CALCIUM SPEC-SCNC: 8.4 MG/DL (ref 8.6–10.5)
CHLORIDE SERPL-SCNC: 107 MMOL/L (ref 98–107)
CO2 SERPL-SCNC: 26.1 MMOL/L (ref 22–29)
CREAT SERPL-MCNC: 0.44 MG/DL (ref 0.57–1)
DEPRECATED RDW RBC AUTO: 52.4 FL (ref 37–54)
EGFRCR SERPLBLD CKD-EPI 2021: 105.5 ML/MIN/1.73
EOSINOPHIL # BLD AUTO: 0 10*3/MM3 (ref 0–0.4)
EOSINOPHIL NFR BLD AUTO: 0 % (ref 0.3–6.2)
ERYTHROCYTE [DISTWIDTH] IN BLOOD BY AUTOMATED COUNT: 13.2 % (ref 12.3–15.4)
GLOBULIN UR ELPH-MCNC: 2.6 GM/DL
GLUCOSE SERPL-MCNC: 145 MG/DL (ref 65–99)
HCT VFR BLD AUTO: 38.2 % (ref 34–46.6)
HGB BLD-MCNC: 12.4 G/DL (ref 12–15.9)
IMM GRANULOCYTES # BLD AUTO: 0.02 10*3/MM3 (ref 0–0.05)
IMM GRANULOCYTES NFR BLD AUTO: 0.3 % (ref 0–0.5)
LYMPHOCYTES # BLD AUTO: 0.3 10*3/MM3 (ref 0.7–3.1)
LYMPHOCYTES NFR BLD AUTO: 4.9 % (ref 19.6–45.3)
MCH RBC QN AUTO: 34.5 PG (ref 26.6–33)
MCHC RBC AUTO-ENTMCNC: 32.5 G/DL (ref 31.5–35.7)
MCV RBC AUTO: 106.4 FL (ref 79–97)
MONOCYTES # BLD AUTO: 0.17 10*3/MM3 (ref 0.1–0.9)
MONOCYTES NFR BLD AUTO: 2.8 % (ref 5–12)
NEUTROPHILS NFR BLD AUTO: 5.59 10*3/MM3 (ref 1.7–7)
NEUTROPHILS NFR BLD AUTO: 92 % (ref 42.7–76)
NRBC BLD AUTO-RTO: 0 /100 WBC (ref 0–0.2)
PLATELET # BLD AUTO: 80 10*3/MM3 (ref 140–450)
PMV BLD AUTO: 11.3 FL (ref 6–12)
POTASSIUM SERPL-SCNC: 3.9 MMOL/L (ref 3.5–5.2)
PROT SERPL-MCNC: 5.9 G/DL (ref 6–8.5)
RBC # BLD AUTO: 3.59 10*6/MM3 (ref 3.77–5.28)
SODIUM SERPL-SCNC: 140 MMOL/L (ref 136–145)
WBC NRBC COR # BLD: 6.08 10*3/MM3 (ref 3.4–10.8)

## 2022-12-23 PROCEDURE — 94761 N-INVAS EAR/PLS OXIMETRY MLT: CPT

## 2022-12-23 PROCEDURE — 94799 UNLISTED PULMONARY SVC/PX: CPT

## 2022-12-23 PROCEDURE — 99232 SBSQ HOSP IP/OBS MODERATE 35: CPT | Performed by: INTERNAL MEDICINE

## 2022-12-23 PROCEDURE — 25010000002 METHYLPREDNISOLONE PER 40 MG: Performed by: HOSPITALIST

## 2022-12-23 PROCEDURE — 80053 COMPREHEN METABOLIC PANEL: CPT | Performed by: INTERNAL MEDICINE

## 2022-12-23 PROCEDURE — 85025 COMPLETE CBC W/AUTO DIFF WBC: CPT | Performed by: INTERNAL MEDICINE

## 2022-12-23 RX ADMIN — OSELTAMIVIR PHOSPHATE 75 MG: 75 CAPSULE ORAL at 08:52

## 2022-12-23 RX ADMIN — BUDESONIDE AND FORMOTEROL FUMARATE DIHYDRATE 2 PUFF: 160; 4.5 AEROSOL RESPIRATORY (INHALATION) at 18:42

## 2022-12-23 RX ADMIN — METHYLPREDNISOLONE SODIUM SUCCINATE 40 MG: 40 INJECTION, POWDER, FOR SOLUTION INTRAMUSCULAR; INTRAVENOUS at 08:52

## 2022-12-23 RX ADMIN — Medication 10 ML: at 21:24

## 2022-12-23 RX ADMIN — ACETAMINOPHEN 650 MG: 325 TABLET ORAL at 16:21

## 2022-12-23 RX ADMIN — GUAIFENESIN 1200 MG: 600 TABLET, EXTENDED RELEASE ORAL at 08:52

## 2022-12-23 RX ADMIN — BUDESONIDE AND FORMOTEROL FUMARATE DIHYDRATE 2 PUFF: 160; 4.5 AEROSOL RESPIRATORY (INHALATION) at 07:34

## 2022-12-23 RX ADMIN — Medication 10 ML: at 08:56

## 2022-12-23 RX ADMIN — IPRATROPIUM BROMIDE AND ALBUTEROL SULFATE 3 ML: 2.5; .5 SOLUTION RESPIRATORY (INHALATION) at 07:34

## 2022-12-23 RX ADMIN — GUAIFENESIN 1200 MG: 600 TABLET, EXTENDED RELEASE ORAL at 21:24

## 2022-12-23 RX ADMIN — OSELTAMIVIR PHOSPHATE 75 MG: 75 CAPSULE ORAL at 21:24

## 2022-12-23 RX ADMIN — Medication 1 TABLET: at 08:52

## 2022-12-23 RX ADMIN — IPRATROPIUM BROMIDE AND ALBUTEROL SULFATE 3 ML: 2.5; .5 SOLUTION RESPIRATORY (INHALATION) at 18:42

## 2022-12-23 RX ADMIN — ASPIRIN AND DIPYRIDAMOLE 1 CAPSULE: 25; 200 CAPSULE, EXTENDED RELEASE ORAL at 21:24

## 2022-12-23 RX ADMIN — ASPIRIN AND DIPYRIDAMOLE 1 CAPSULE: 25; 200 CAPSULE, EXTENDED RELEASE ORAL at 08:52

## 2022-12-23 RX ADMIN — METHYLPREDNISOLONE SODIUM SUCCINATE 40 MG: 40 INJECTION, POWDER, FOR SOLUTION INTRAMUSCULAR; INTRAVENOUS at 21:24

## 2022-12-23 NOTE — PLAN OF CARE
Goal Outcome Evaluation:              Outcome Evaluation: patient has rested well tonight with changes during shift.

## 2022-12-23 NOTE — PROGRESS NOTES
[facility] HOSPITALIST PROGRESS NOTE    Subjective     History:   Florinda Barr is a 67 y.o. female admitted on 12/21/2022 secondary to Influenza A     Procedures: None    CC: Follow up influenza , COPD exacerbation    Patient seen and examined with VENITA Boateng. Awake and alert. Cough and dyspnea improved today. No reported CP or palpitations. No reported nausea or vomiting. O2 requirements stable. No acute events overnight per RN.      History taken from: patient, chart, and RN.      Objective     Vital Signs  Temp:  [97.9 °F (36.6 °C)-98.2 °F (36.8 °C)] 97.9 °F (36.6 °C)  Heart Rate:  [78-92] 84  Resp:  [18] 18  BP: (105-113)/(45-60) 113/60    Intake/Output Summary (Last 24 hours) at 12/23/2022 1614  Last data filed at 12/23/2022 0300  Gross per 24 hour   Intake 470 ml   Output --   Net 470 ml         Physical Exam:  General:    Awake, alert, in no acute distress, thin, chronically ill appearing   Heart:      Normal S1 and S2. Regular rate and rhythm. No significant murmur, rubs or gallops appreciated.   Lungs:     Respirations regular, even and unlabored. Scattered expiratory wheezes with diminished breath sounds at bases but aeration improved.    Abdomen:   Soft and nontender. No guarding, rebound tenderness or  organomegaly noted. Bowel sounds present x 4.   Extremities:  No clubbing, cyanosis or edema noted. Moves UE and LE equally B/L.     Results Review:    Results from last 7 days   Lab Units 12/23/22  0330 12/22/22  0304 12/21/22  1617   WBC 10*3/mm3 6.08 3.88 3.54   HEMOGLOBIN g/dL 12.4 12.4 14.5   PLATELETS 10*3/mm3 80* 72* 73*     Results from last 7 days   Lab Units 12/23/22  0330 12/22/22  0304 12/21/22  1617   SODIUM mmol/L 140 139 137   POTASSIUM mmol/L 3.9 3.6 3.9   CHLORIDE mmol/L 107 107 102   CO2 mmol/L 26.1 21.1* 20.1*   BUN mg/dL 15 15 19   CREATININE mg/dL 0.44* 0.56* 0.66   CALCIUM mg/dL 8.4* 7.9* 8.6   GLUCOSE mg/dL 145* 163* 109*     Results from last 7 days   Lab Units  12/23/22  0330 12/22/22  0304 12/21/22  1617   BILIRUBIN mg/dL 0.3 0.3 0.6   ALK PHOS U/L 50 44 65   AST (SGOT) U/L 27 35* 36*   ALT (SGPT) U/L 17 19 23             Results from last 7 days   Lab Units 12/21/22  1617   TROPONIN T ng/mL <0.010       Imaging Results (Last 24 Hours)     ** No results found for the last 24 hours. **            Medications:  aspirin-dipyridamole, 1 capsule, Oral, Q12H  budesonide-formoterol, 2 puff, Inhalation, BID - RT  folic acid, 500 mcg, Oral, Every Other Day  guaiFENesin, 1,200 mg, Oral, Q12H  ipratropium-albuterol, 3 mL, Nebulization, Q6H - RT  methylPREDNISolone sodium succinate, 40 mg, Intravenous, Q12H  multivitamin, 1 tablet, Oral, Daily  oseltamivir, 75 mg, Oral, Q12H  rosuvastatin, 40 mg, Oral, Every Other Day  sodium chloride, 10 mL, Intravenous, Q12H               Assessment & Plan   Sepsis (present on admission): Likely 2/2 influenza A and COPD exacerbation. Afebrile and hemodynamically stable. WBC remains stable. Lactate and procal normal. CRP minimally elevated. Blood cultures with NGTD. No obvious infiltrates on imaging. Cont Tamiflu. Follow cultures and repeat labs in the AM.     (+) influenza A: Cont Tamiflu, isolation and supportive treatment.     Acute exacerbation of COPD: Cont treatment of influenza as above, steroids and nebs.     Acute on chronic thrombocytopenia: Likely 2/2 viral illness. Slightly improved today. Repeat CBC in the AM.     Chronic hypoxic respiratory failure: O2 requirements stable. Cont treatment as above.     Hyperlipidemia: Cont statin.    Severe malnutrition: Cont MVI. Cont supplements.     DVT PPX: SCDs      Disposition: Likely home when medically stable, possibly in AM.     Ashu Chávez DO  12/23/22  16:14 EST

## 2022-12-23 NOTE — PLAN OF CARE
Goal Outcome Evaluation:           Progress: no change  Outcome Evaluation: pt resting in bed, ambulated pt around the room and she tolerated well, no acute changes in pt, no complaints, will continue plan of care.

## 2022-12-24 VITALS
HEIGHT: 61 IN | OXYGEN SATURATION: 95 % | DIASTOLIC BLOOD PRESSURE: 65 MMHG | SYSTOLIC BLOOD PRESSURE: 139 MMHG | HEART RATE: 86 BPM | TEMPERATURE: 98.1 F | RESPIRATION RATE: 18 BRPM | BODY MASS INDEX: 16.56 KG/M2 | WEIGHT: 87.7 LBS

## 2022-12-24 LAB
ANION GAP SERPL CALCULATED.3IONS-SCNC: 7.7 MMOL/L (ref 5–15)
BASOPHILS # BLD AUTO: 0 10*3/MM3 (ref 0–0.2)
BASOPHILS NFR BLD AUTO: 0 % (ref 0–1.5)
BUN SERPL-MCNC: 24 MG/DL (ref 8–23)
BUN/CREAT SERPL: 46.2 (ref 7–25)
CALCIUM SPEC-SCNC: 8.5 MG/DL (ref 8.6–10.5)
CHLORIDE SERPL-SCNC: 104 MMOL/L (ref 98–107)
CO2 SERPL-SCNC: 28.3 MMOL/L (ref 22–29)
CREAT SERPL-MCNC: 0.52 MG/DL (ref 0.57–1)
DEPRECATED RDW RBC AUTO: 53.4 FL (ref 37–54)
EGFRCR SERPLBLD CKD-EPI 2021: 101.3 ML/MIN/1.73
EOSINOPHIL # BLD AUTO: 0 10*3/MM3 (ref 0–0.4)
EOSINOPHIL NFR BLD AUTO: 0 % (ref 0.3–6.2)
ERYTHROCYTE [DISTWIDTH] IN BLOOD BY AUTOMATED COUNT: 13.2 % (ref 12.3–15.4)
GLUCOSE SERPL-MCNC: 167 MG/DL (ref 65–99)
HCT VFR BLD AUTO: 37.5 % (ref 34–46.6)
HGB BLD-MCNC: 12.3 G/DL (ref 12–15.9)
IMM GRANULOCYTES # BLD AUTO: 0.05 10*3/MM3 (ref 0–0.05)
IMM GRANULOCYTES NFR BLD AUTO: 0.8 % (ref 0–0.5)
LYMPHOCYTES # BLD AUTO: 0.31 10*3/MM3 (ref 0.7–3.1)
LYMPHOCYTES NFR BLD AUTO: 5.1 % (ref 19.6–45.3)
MCH RBC QN AUTO: 35.7 PG (ref 26.6–33)
MCHC RBC AUTO-ENTMCNC: 32.8 G/DL (ref 31.5–35.7)
MCV RBC AUTO: 108.7 FL (ref 79–97)
MONOCYTES # BLD AUTO: 0.17 10*3/MM3 (ref 0.1–0.9)
MONOCYTES NFR BLD AUTO: 2.8 % (ref 5–12)
NEUTROPHILS NFR BLD AUTO: 5.6 10*3/MM3 (ref 1.7–7)
NEUTROPHILS NFR BLD AUTO: 91.3 % (ref 42.7–76)
NRBC BLD AUTO-RTO: 0 /100 WBC (ref 0–0.2)
PLATELET # BLD AUTO: 82 10*3/MM3 (ref 140–450)
PMV BLD AUTO: 11.5 FL (ref 6–12)
POTASSIUM SERPL-SCNC: 3.8 MMOL/L (ref 3.5–5.2)
QT INTERVAL: 312 MS
QT INTERVAL: 398 MS
QTC INTERVAL: 450 MS
QTC INTERVAL: 481 MS
RBC # BLD AUTO: 3.45 10*6/MM3 (ref 3.77–5.28)
SODIUM SERPL-SCNC: 140 MMOL/L (ref 136–145)
WBC NRBC COR # BLD: 6.13 10*3/MM3 (ref 3.4–10.8)

## 2022-12-24 PROCEDURE — 25010000002 METHYLPREDNISOLONE PER 40 MG: Performed by: HOSPITALIST

## 2022-12-24 PROCEDURE — 99239 HOSP IP/OBS DSCHRG MGMT >30: CPT | Performed by: INTERNAL MEDICINE

## 2022-12-24 PROCEDURE — 94761 N-INVAS EAR/PLS OXIMETRY MLT: CPT

## 2022-12-24 PROCEDURE — 80048 BASIC METABOLIC PNL TOTAL CA: CPT | Performed by: INTERNAL MEDICINE

## 2022-12-24 PROCEDURE — 85025 COMPLETE CBC W/AUTO DIFF WBC: CPT | Performed by: INTERNAL MEDICINE

## 2022-12-24 PROCEDURE — 94799 UNLISTED PULMONARY SVC/PX: CPT

## 2022-12-24 RX ORDER — GUAIFENESIN 600 MG/1
1200 TABLET, EXTENDED RELEASE ORAL EVERY 12 HOURS SCHEDULED
Qty: 20 TABLET | Refills: 0 | Status: SHIPPED | OUTPATIENT
Start: 2022-12-24 | End: 2022-12-24 | Stop reason: SDUPTHER

## 2022-12-24 RX ORDER — DOXYCYCLINE 100 MG/1
CAPSULE ORAL
Qty: 14 CAPSULE | Refills: 0 | Status: SHIPPED | OUTPATIENT
Start: 2022-12-24

## 2022-12-24 RX ORDER — BUDESONIDE AND FORMOTEROL FUMARATE DIHYDRATE 160; 4.5 UG/1; UG/1
2 AEROSOL RESPIRATORY (INHALATION)
Qty: 10.2 G | Refills: 0 | Status: SHIPPED | OUTPATIENT
Start: 2022-12-24 | End: 2023-01-23

## 2022-12-24 RX ORDER — DIPHENOXYLATE HYDROCHLORIDE AND ATROPINE SULFATE 2.5; .025 MG/1; MG/1
1 TABLET ORAL DAILY
Qty: 30 TABLET | Refills: 0 | Status: SHIPPED | OUTPATIENT
Start: 2022-12-24 | End: 2022-12-24 | Stop reason: SDUPTHER

## 2022-12-24 RX ORDER — OSELTAMIVIR PHOSPHATE 75 MG/1
75 CAPSULE ORAL EVERY 12 HOURS SCHEDULED
Qty: 5 CAPSULE | Refills: 0 | Status: SHIPPED | OUTPATIENT
Start: 2022-12-24 | End: 2022-12-24 | Stop reason: SDUPTHER

## 2022-12-24 RX ORDER — PREDNISONE 20 MG/1
40 TABLET ORAL DAILY
Qty: 10 TABLET | Refills: 0 | Status: SHIPPED | OUTPATIENT
Start: 2022-12-24

## 2022-12-24 RX ORDER — GUAIFENESIN 600 MG/1
1200 TABLET, EXTENDED RELEASE ORAL EVERY 12 HOURS SCHEDULED
Qty: 20 TABLET | Refills: 0 | Status: SHIPPED | OUTPATIENT
Start: 2022-12-24 | End: 2022-12-29

## 2022-12-24 RX ORDER — IPRATROPIUM BROMIDE AND ALBUTEROL SULFATE 2.5; .5 MG/3ML; MG/3ML
SOLUTION RESPIRATORY (INHALATION)
Qty: 18 ML | Refills: 0 | Status: SHIPPED | OUTPATIENT
Start: 2022-12-24

## 2022-12-24 RX ORDER — PREDNISONE 20 MG/1
40 TABLET ORAL DAILY
Qty: 10 TABLET | Refills: 0 | Status: SHIPPED | OUTPATIENT
Start: 2022-12-24 | End: 2022-12-29

## 2022-12-24 RX ORDER — OSELTAMIVIR PHOSPHATE 75 MG/1
75 CAPSULE ORAL EVERY 12 HOURS SCHEDULED
Qty: 5 CAPSULE | Refills: 0 | Status: SHIPPED | OUTPATIENT
Start: 2022-12-24 | End: 2022-12-27

## 2022-12-24 RX ORDER — DIPHENOXYLATE HYDROCHLORIDE AND ATROPINE SULFATE 2.5; .025 MG/1; MG/1
1 TABLET ORAL DAILY
Qty: 30 TABLET | Refills: 0 | Status: SHIPPED | OUTPATIENT
Start: 2022-12-24

## 2022-12-24 RX ORDER — PREDNISONE 20 MG/1
40 TABLET ORAL DAILY
Qty: 10 TABLET | Refills: 0 | Status: SHIPPED | OUTPATIENT
Start: 2022-12-24 | End: 2022-12-24 | Stop reason: SDUPTHER

## 2022-12-24 RX ORDER — BUDESONIDE AND FORMOTEROL FUMARATE DIHYDRATE 160; 4.5 UG/1; UG/1
2 AEROSOL RESPIRATORY (INHALATION)
Qty: 10.2 G | Refills: 0 | Status: SHIPPED | OUTPATIENT
Start: 2022-12-24 | End: 2022-12-24 | Stop reason: SDUPTHER

## 2022-12-24 RX ADMIN — ROSUVASTATIN CALCIUM 40 MG: 20 TABLET, FILM COATED ORAL at 08:29

## 2022-12-24 RX ADMIN — ASPIRIN AND DIPYRIDAMOLE 1 CAPSULE: 25; 200 CAPSULE, EXTENDED RELEASE ORAL at 08:30

## 2022-12-24 RX ADMIN — Medication 1 TABLET: at 08:30

## 2022-12-24 RX ADMIN — IPRATROPIUM BROMIDE AND ALBUTEROL SULFATE 3 ML: 2.5; .5 SOLUTION RESPIRATORY (INHALATION) at 06:41

## 2022-12-24 RX ADMIN — GUAIFENESIN 1200 MG: 600 TABLET, EXTENDED RELEASE ORAL at 08:29

## 2022-12-24 RX ADMIN — BUDESONIDE AND FORMOTEROL FUMARATE DIHYDRATE 2 PUFF: 160; 4.5 AEROSOL RESPIRATORY (INHALATION) at 06:51

## 2022-12-24 RX ADMIN — FOLIC ACID 500 MCG: 1 TABLET ORAL at 08:29

## 2022-12-24 RX ADMIN — METHYLPREDNISOLONE SODIUM SUCCINATE 40 MG: 40 INJECTION, POWDER, FOR SOLUTION INTRAMUSCULAR; INTRAVENOUS at 08:30

## 2022-12-24 RX ADMIN — Medication 10 ML: at 08:30

## 2022-12-24 RX ADMIN — OSELTAMIVIR PHOSPHATE 75 MG: 75 CAPSULE ORAL at 08:30

## 2022-12-24 NOTE — PLAN OF CARE
Goal Outcome Evaluation:              Outcome Evaluation: patient has rested well tonight with no complaints. patient states she hopes to go home today. no changes.

## 2022-12-24 NOTE — NURSING NOTE
Pt ambulated on 1L nasal cannula, tolerated well. O2 saturation maintained at 95% while ambulating, with no complaints of shortness of breath.

## 2022-12-24 NOTE — DISCHARGE INSTR - APPOINTMENTS
Please call as soon as possible to schedule an appointment to see CARMELO Mcfadden within one week.  You can reach the office at [phone].

## 2022-12-24 NOTE — DISCHARGE SUMMARY
[facility] DISCHARGE SUMMARY      Date of Admission: 12/21/2022    Date of Discharge: 12/24/2022     PCP: Tata Varghese APRN    Admission Diagnosis:   Please see admission H&P    Discharge Diagnosis:   Sepsis (present on admission)  Acute influenza A  Acute exacerbation of COPD  Acute on chronic thrombocytopenia  Chronic hypoxic respiratory failure  Hyperlipidemia  Severe malnutrition  Tobacco abuse    Procedures Performed: None     Consults:   Consults     No orders found from 11/22/2022 to 12/22/2022.            History of Present Illness:  Florinda Barr is a 67 y.o. female who presented to [facility] ED with CC of fever, chills, sore throat, body aches, cough and shortness of breath. Please see admission H&P for complete details.     In the ED, workup revealed sepsis, acute influenza A, an acute COPD exacerbation and acute on chronic thrombocytopenia. Cultures were obtained.      Hospital Course  Florinda Barr was admitted to the med/surg floor for further evaluation and treatment. She was started on Tamiflu, steroids and nebs. Imaging did not reveal any evidence of pneumonia with normal procal and thus antibiotics were not initiated.     Follow up labs revealed improvement in her thrombocytopenia with her acute viral illness thought to be contributing to the acute component. Blood cultures were followed and remained negative. Her sepsis began resolving and she remained clinically stable with stable O2 requirements. IVF's were stopped and expectorants added with improvement in her respiratory status.     Ms. Barr was seen and examined with VENITA Lewis on 12/24. She remained afebrile and hemodynamically stable with stable AM labs. She felt much improved from upon admission and deemed medically stable for discharge. She was prescribed a course of Tamiflu and steroids. A COPD rescue kit was prescribed at discharge. Instructions were given for follow up with her PCP in 1 week.     Condition on  Discharge:  Stable    Vital Signs  Vitals:    12/24/22 0651   BP:    Pulse: 86   Resp: 18   Temp:    SpO2: 95%       Physical Exam:  General:    Awake, alert, in no acute distress, thin, chronically ill appearing    Heart:      Normal S1 and S2. Regular rate and rhythm. No significant murmur, rubs or gallops appreciated.   Lungs:     Respirations regular, even and unlabored. Lungs clear to auscultation B/L. No wheezes, rales or rhonchi.   Abdomen:   Soft and nontender. No guarding, rebound tenderness or  organomegaly noted. Bowel sounds present x 4.   Extremities:  No clubbing, cyanosis or edema noted. Moves UE and LE equally B/L.     Discharge Disposition:   home      Discharge Medications:     Discharge Medications      New Medications      Instructions Start Date   doxycycline 100 MG capsule  Commonly known as: MONODOX   Take 1 capsule by mouth every 12 hours for 7 days as part of COPD Rescue Kit. (Only Start if in YELLOW ZONE.)      guaiFENesin 600 MG 12 hr tablet  Commonly known as: MUCINEX   1,200 mg, Oral, Every 12 Hours Scheduled      ipratropium-albuterol 0.5-2.5 mg/3 ml nebulizer  Commonly known as: DUO-NEB   Take 3 mL by neb every 30 minutes as needed for shortness of air for up to 6 doses. Part of COPD Rescue Kit. (Only Start if in YELLOW ZONE.)      multivitamin tablet tablet   1 tablet, Oral, Daily      oseltamivir 75 MG capsule  Commonly known as: TAMIFLU   75 mg, Oral, Every 12 Hours Scheduled      predniSONE 20 MG tablet  Commonly known as: DELTASONE   40 mg, Oral, Daily      predniSONE 20 MG tablet  Commonly known as: DELTASONE   40 mg, Oral, Daily, Take 2 tablets daily for 5 days as part of COPD Rescue Kit. (Only Start if in YELLOW ZONE.)         Continue These Medications      Instructions Start Date   albuterol sulfate  (90 Base) MCG/ACT inhaler  Commonly known as: PROVENTIL HFA;VENTOLIN HFA;PROAIR HFA   2 puffs, Inhalation, Every 6 Hours PRN      albuterol (2.5 MG/3ML) 0.083% nebulizer  solution  Commonly known as: PROVENTIL   2.5 mg, Nebulization, Every 6 Hours PRN      aspirin-dipyridamole  MG per 12 hr capsule  Commonly known as: AGGRENOX   1 capsule, Oral, 2 Times Daily      budesonide-formoterol 160-4.5 MCG/ACT inhaler  Commonly known as: SYMBICORT   Inhale 2 puffs by mouth 2 (Two) Times a Day for 30 days.      coenzyme Q10 100 MG capsule   100 mg, Oral, Every Other Day      folic acid 400 MCG tablet  Commonly known as: FOLVITE   400 mcg, Oral, Every Other Day      rosuvastatin 40 MG tablet  Commonly known as: CRESTOR   40 mg, Oral, Every Other Day               Discharge Diet:   Dietary Orders (From admission, onward)     Start     Ordered    12/22/22 1200  Dietary Nutrition Supplements Boost Plus (Ensure Enlive, Ensure Plus)  Daily With Breakfast, Lunch & Dinner      Question:  Select Supplement  Answer:  Boost Plus (Ensure Enlive, Ensure Plus)    12/22/22 1127    12/22/22 1128  Diet: Regular/House Diet; Texture: Regular Texture (IDDSI 7); Fluid Consistency: Thin (IDDSI 0)  Diet Effective Now        References:    Diet Order Crosswalk   Question Answer Comment   Diets: Regular/House Diet    Texture: Regular Texture (IDDSI 7)    Fluid Consistency: Thin (IDDSI 0)        12/22/22 1128                Activity at Discharge:  activity as tolerated    Follow-up Appointments:  Additional Instructions for the Follow-ups that You Need to Schedule     Discharge Follow-up with PCP   As directed       Currently Documented PCP:    Tata Varghese APRN    PCP Phone Number:    [phone]     Follow Up Details: Follow up with CARMELO Tamayo in 1 week.            Follow-up Information     Tata Varghese APRN .    Specialty: Family Medicine  Why: Follow up with CARMELO Tamayo in 1 week.  Contact information:  [address]  [place] [address] [phone]                             Test Results Pending at Discharge:  Pending Labs     Order Current Status    Blood  Culture - Blood, Arm, Left Preliminary result    Blood Culture - Blood, Arm, Right Preliminary result           The ASCVD Risk score (Myo JOSUE, et al., 2019) failed to calculate for the following reasons:    Cannot find a previous HDL lab    Cannot find a previous total cholesterol lab      Ashu Chávez DO  12/24/22  08:00 EST      Time: Greater than 30 minutes spent on this discharge.

## 2022-12-25 ENCOUNTER — READMISSION MANAGEMENT (OUTPATIENT)
Dept: CALL CENTER | Facility: HOSPITAL | Age: 67
End: 2022-12-25

## 2022-12-25 NOTE — OUTREACH NOTE
Prep Survey    Flowsheet Row Responses   [facility] facility patient discharged from? [place]   Is LACE score < 7 ? No   Eligibility Readm Mgmt   Discharge diagnosis Sepsis    Does the patient have one of the following disease processes/diagnoses(primary or secondary)? Sepsis   Does the patient have Home health ordered? No   Is there a DME ordered? No   Prep survey completed? Yes          CHRIS GARDUNO - Registered Nurse

## 2022-12-26 ENCOUNTER — TELEPHONE (OUTPATIENT)
Dept: MEDSURG UNIT | Facility: HOSPITAL | Age: 67
End: 2022-12-26

## 2022-12-26 LAB
BACTERIA SPEC AEROBE CULT: NORMAL
BACTERIA SPEC AEROBE CULT: NORMAL

## 2022-12-26 NOTE — PAYOR COMM NOTE
CONTACT:   Amy Jeronimo RN  Phone: [phone]  Fax: [phone]    DISCHARGE NOTIFICATION-still pending APPROVAL    DISCHARGE TO: home    REF # [id number]  DC DATE: 12/24/22  IF YOU NEED ANYTHING FURTHER PLEASE LET ME KNOW.   THANKS     Florinda Cota (67 y.o. Female)     Date of Birth   1955    Social Security Number       Address   [address] [facility] [address]    Home Phone   [phone]    MRN   [id number]       [facility]    Marital Status                               Admission Date   12/21/22    Admission Type   Emergency    Admitting Provider   Luis Salazar MD    Attending Provider       Department, Room/Bed   [address], [address]/       Discharge Date   12/24/2022    Discharge Disposition   Home or Self Care    Discharge Destination                               Attending Provider: (none)   Allergies: Codeine, Latex, Pletal [Cilostazol]    Isolation: None   Infection: Influenza (12/21/22)   Code Status: Prior    Ht: 154.9 cm (61\")   Wt: 39.8 kg (87 lb 11.2 oz)    Admission Cmt: None   Principal Problem: Influenza A [J10.1]                 Active Insurance as of 12/21/2022     Primary Coverage     Payor Plan Insurance Group Employer/Plan Group    ANTHEM MEDICARE REPLACEMENT ANTHEM MEDICARE ADVANTAGE [id number]     Payor Plan Address Payor Plan Phone Number Payor Plan Fax Number Effective Dates    [address] [phone]  1/1/2022 - None Entered    [facility] [address]       Subscriber Name Subscriber Birth Date Member ID       FLORINDA COTA 1955 [id number]           Secondary Coverage     Payor Plan Insurance Group Employer/Plan Group    [facility] MEDICAID      Payor Plan Address Payor Plan Phone Number Payor Plan Fax Number Effective Dates    [address] [phone]  2/1/2016 - None Entered    [facility] [id number]       Subscriber Name Subscriber Birth Date Member ID       FLORINDA COTA 1955 [id number]                  Emergency Contacts      (Rel.) Home Phone Work Phone Mobile Phone    Tyra Barr (Daughter) [phone] -- --               Discharge Summary      Ashu Chávez DO at 12/24/22 0800              [facility] DISCHARGE SUMMARY      Date of Admission: 12/21/2022    Date of Discharge: 12/24/2022     PCP: Tata Varghese APRN    Admission Diagnosis:   Please see admission H&P    Discharge Diagnosis:   Sepsis (present on admission)  Acute influenza A  Acute exacerbation of COPD  Acute on chronic thrombocytopenia  Chronic hypoxic respiratory failure  Hyperlipidemia  Severe malnutrition  Tobacco abuse    Procedures Performed: None     Consults:   Consults     No orders found from 11/22/2022 to 12/22/2022.            History of Present Illness:  Florinda Barr is a 67 y.o. female who presented to [facility] ED with CC of fever, chills, sore throat, body aches, cough and shortness of breath. Please see admission H&P for complete details.     In the ED, workup revealed sepsis, acute influenza A, an acute COPD exacerbation and acute on chronic thrombocytopenia. Cultures were obtained.      Hospital Course  Florinda Barr was admitted to the med/surg floor for further evaluation and treatment. She was started on Tamiflu, steroids and nebs. Imaging did not reveal any evidence of pneumonia with normal procal and thus antibiotics were not initiated.     Follow up labs revealed improvement in her thrombocytopenia with her acute viral illness thought to be contributing to the acute component. Blood cultures were followed and remained negative. Her sepsis began resolving and she remained clinically stable with stable O2 requirements. IVF's were stopped and expectorants added with improvement in her respiratory status.     Ms. Barr was seen and examined with VENITA Lewis on 12/24. She remained afebrile and hemodynamically stable with stable AM labs. She felt much improved from upon admission and  deemed medically stable for discharge. She was prescribed a course of Tamiflu and steroids. A COPD rescue kit was prescribed at discharge. Instructions were given for follow up with her PCP in 1 week.     Condition on Discharge:  Stable    Vital Signs  Vitals:    12/24/22 0651   BP:    Pulse: 86   Resp: 18   Temp:    SpO2: 95%       Physical Exam:  General:    Awake, alert, in no acute distress, thin, chronically ill appearing    Heart:      Normal S1 and S2. Regular rate and rhythm. No significant murmur, rubs or gallops appreciated.   Lungs:     Respirations regular, even and unlabored. Lungs clear to auscultation B/L. No wheezes, rales or rhonchi.   Abdomen:   Soft and nontender. No guarding, rebound tenderness or  organomegaly noted. Bowel sounds present x 4.   Extremities:  No clubbing, cyanosis or edema noted. Moves UE and LE equally B/L.     Discharge Disposition:   home      Discharge Medications:     Discharge Medications      New Medications      Instructions Start Date   doxycycline 100 MG capsule  Commonly known as: MONODOX   Take 1 capsule by mouth every 12 hours for 7 days as part of COPD Rescue Kit. (Only Start if in YELLOW ZONE.)      guaiFENesin 600 MG 12 hr tablet  Commonly known as: MUCINEX   1,200 mg, Oral, Every 12 Hours Scheduled      ipratropium-albuterol 0.5-2.5 mg/3 ml nebulizer  Commonly known as: DUO-NEB   Take 3 mL by neb every 30 minutes as needed for shortness of air for up to 6 doses. Part of COPD Rescue Kit. (Only Start if in YELLOW ZONE.)      multivitamin tablet tablet   1 tablet, Oral, Daily      oseltamivir 75 MG capsule  Commonly known as: TAMIFLU   75 mg, Oral, Every 12 Hours Scheduled      predniSONE 20 MG tablet  Commonly known as: DELTASONE   40 mg, Oral, Daily      predniSONE 20 MG tablet  Commonly known as: DELTASONE   40 mg, Oral, Daily, Take 2 tablets daily for 5 days as part of COPD Rescue Kit. (Only Start if in YELLOW ZONE.)         Continue These Medications       Instructions Start Date   albuterol sulfate  (90 Base) MCG/ACT inhaler  Commonly known as: PROVENTIL HFA;VENTOLIN HFA;PROAIR HFA   2 puffs, Inhalation, Every 6 Hours PRN      albuterol (2.5 MG/3ML) 0.083% nebulizer solution  Commonly known as: PROVENTIL   2.5 mg, Nebulization, Every 6 Hours PRN      aspirin-dipyridamole  MG per 12 hr capsule  Commonly known as: AGGRENOX   1 capsule, Oral, 2 Times Daily      budesonide-formoterol 160-4.5 MCG/ACT inhaler  Commonly known as: SYMBICORT   Inhale 2 puffs by mouth 2 (Two) Times a Day for 30 days.      coenzyme Q10 100 MG capsule   100 mg, Oral, Every Other Day      folic acid 400 MCG tablet  Commonly known as: FOLVITE   400 mcg, Oral, Every Other Day      rosuvastatin 40 MG tablet  Commonly known as: CRESTOR   40 mg, Oral, Every Other Day               Discharge Diet:   Dietary Orders (From admission, onward)     Start     Ordered    12/22/22 1200  Dietary Nutrition Supplements Boost Plus (Ensure Enlive, Ensure Plus)  Daily With Breakfast, Lunch & Dinner      Question:  Select Supplement  Answer:  Boost Plus (Ensure Enlive, Ensure Plus)    12/22/22 1127    12/22/22 1128  Diet: Regular/House Diet; Texture: Regular Texture (IDDSI 7); Fluid Consistency: Thin (IDDSI 0)  Diet Effective Now        References:    Diet Order Crosswalk   Question Answer Comment   Diets: Regular/House Diet    Texture: Regular Texture (IDDSI 7)    Fluid Consistency: Thin (IDDSI 0)        12/22/22 1128                Activity at Discharge:  activity as tolerated    Follow-up Appointments:  Additional Instructions for the Follow-ups that You Need to Schedule     Discharge Follow-up with PCP   As directed       Currently Documented PCP:    Tata Varghese APRN    PCP Phone Number:    [phone]     Follow Up Details: Follow up with CARMELO Tamayo in 1 week.            Follow-up Information     Tata Varghese APRN .    Specialty: Family Medicine  Why: Follow up with  CARMELO Tamayo in 1 week.  Contact information:  [place] [address] [phone]                             Test Results Pending at Discharge:  Pending Labs     Order Current Status    Blood Culture - Blood, Arm, Left Preliminary result    Blood Culture - Blood, Arm, Right Preliminary result           The ASCVD Risk score (Moy JOSUE, et al., 2019) failed to calculate for the following reasons:    Cannot find a previous HDL lab    Cannot find a previous total cholesterol lab      Ashu Chávez DO  12/24/22  08:00 EST      Time: Greater than 30 minutes spent on this discharge.          Electronically signed by Ashu Chávez DO at 12/24/22 3077

## 2022-12-26 NOTE — PAYOR COMM NOTE
CONTACT:   Amy Jeronimo RN  Phone: [phone]  Fax: [phone]    DISCHARGE NOTIFICATION-still pending approval     DISCHARGE TO: home    REF # [id number]  DC DATE: 12/24/22  IF YOU NEED ANYTHING FURTHER PLEASE LET ME KNOW.   THANKS     Florinda Cota (67 y.o. Female)     Date of Birth   1955    Social Security Number       Address   [address] [facility] [address]    Home Phone   [phone]    MRN   [id number]       [place]   [facility]    Marital Status                               Admission Date   12/21/22    Admission Type   Emergency    Admitting Provider   Luis Salazar MD    Attending Provider       Department, Room/Bed   [address], [address]/       Discharge Date   12/24/2022    Discharge Disposition   Home or Self Care    Discharge Destination                               Attending Provider: (none)   Allergies: Codeine, Latex, Pletal [Cilostazol]    Isolation: None   Infection: Influenza (12/21/22)   Code Status: Prior    Ht: 154.9 cm (61\")   Wt: 39.8 kg (87 lb 11.2 oz)    Admission Cmt: None   Principal Problem: Influenza A [J10.1]                 Active Insurance as of 12/21/2022     Primary Coverage     Payor Plan Insurance Group Employer/Plan Group    ANTHEM MEDICARE REPLACEMENT ANTHEM MEDICARE ADVANTAGE [id number]     Payor Plan Address Payor Plan Phone Number Payor Plan Fax Number Effective Dates    [address] [phone]  1/1/2022 - None Entered    [facility] [address]       Subscriber Name Subscriber Birth Date Member ID       FLORINDA COTA 1955 [id number]           Secondary Coverage     Payor Plan Insurance Group Employer/Plan Group    [facility] MEDICAID      Payor Plan Address Payor Plan Phone Number Payor Plan Fax Number Effective Dates    [address] [address] [phone]  2/1/2016 - None Entered    [facility] [id number]       Subscriber Name Subscriber Birth Date Member ID       FLORINDA COTA 1955 [id number]                  Emergency Contacts      (Rel.) Home Phone Work Phone Mobile Phone    Tyra Barr (Daughter) [phone] -- --               Discharge Summary      Ashu Chávez DO at 12/24/22 0800              [facility] DISCHARGE SUMMARY      Date of Admission: 12/21/2022    Date of Discharge: 12/24/2022     PCP: Tata Varghese APRN    Admission Diagnosis:   Please see admission H&P    Discharge Diagnosis:   Sepsis (present on admission)  Acute influenza A  Acute exacerbation of COPD  Acute on chronic thrombocytopenia  Chronic hypoxic respiratory failure  Hyperlipidemia  Severe malnutrition  Tobacco abuse    Procedures Performed: None     Consults:   Consults     No orders found from 11/22/2022 to 12/22/2022.            History of Present Illness:  Florinda Barr is a 67 y.o. female who presented to [facility] ED with CC of fever, chills, sore throat, body aches, cough and shortness of breath. Please see admission H&P for complete details.     In the ED, workup revealed sepsis, acute influenza A, an acute COPD exacerbation and acute on chronic thrombocytopenia. Cultures were obtained.      Hospital Course  Florinda Barr was admitted to the med/surg floor for further evaluation and treatment. She was started on Tamiflu, steroids and nebs. Imaging did not reveal any evidence of pneumonia with normal procal and thus antibiotics were not initiated.     Follow up labs revealed improvement in her thrombocytopenia with her acute viral illness thought to be contributing to the acute component. Blood cultures were followed and remained negative. Her sepsis began resolving and she remained clinically stable with stable O2 requirements. IVF's were stopped and expectorants added with improvement in her respiratory status.     Ms. Barr was seen and examined with VENITA Lewis on 12/24. She remained afebrile and hemodynamically stable with stable AM labs. She felt much improved from upon admission and  deemed medically stable for discharge. She was prescribed a course of Tamiflu and steroids. A COPD rescue kit was prescribed at discharge. Instructions were given for follow up with her PCP in 1 week.     Condition on Discharge:  Stable    Vital Signs  Vitals:    12/24/22 0651   BP:    Pulse: 86   Resp: 18   Temp:    SpO2: 95%       Physical Exam:  General:    Awake, alert, in no acute distress, thin, chronically ill appearing    Heart:      Normal S1 and S2. Regular rate and rhythm. No significant murmur, rubs or gallops appreciated.   Lungs:     Respirations regular, even and unlabored. Lungs clear to auscultation B/L. No wheezes, rales or rhonchi.   Abdomen:   Soft and nontender. No guarding, rebound tenderness or  organomegaly noted. Bowel sounds present x 4.   Extremities:  No clubbing, cyanosis or edema noted. Moves UE and LE equally B/L.     Discharge Disposition:   home      Discharge Medications:     Discharge Medications      New Medications      Instructions Start Date   doxycycline 100 MG capsule  Commonly known as: MONODOX   Take 1 capsule by mouth every 12 hours for 7 days as part of COPD Rescue Kit. (Only Start if in YELLOW ZONE.)      guaiFENesin 600 MG 12 hr tablet  Commonly known as: MUCINEX   1,200 mg, Oral, Every 12 Hours Scheduled      ipratropium-albuterol 0.5-2.5 mg/3 ml nebulizer  Commonly known as: DUO-NEB   Take 3 mL by neb every 30 minutes as needed for shortness of air for up to 6 doses. Part of COPD Rescue Kit. (Only Start if in YELLOW ZONE.)      multivitamin tablet tablet   1 tablet, Oral, Daily      oseltamivir 75 MG capsule  Commonly known as: TAMIFLU   75 mg, Oral, Every 12 Hours Scheduled      predniSONE 20 MG tablet  Commonly known as: DELTASONE   40 mg, Oral, Daily      predniSONE 20 MG tablet  Commonly known as: DELTASONE   40 mg, Oral, Daily, Take 2 tablets daily for 5 days as part of COPD Rescue Kit. (Only Start if in YELLOW ZONE.)         Continue These Medications       Instructions Start Date   albuterol sulfate  (90 Base) MCG/ACT inhaler  Commonly known as: PROVENTIL HFA;VENTOLIN HFA;PROAIR HFA   2 puffs, Inhalation, Every 6 Hours PRN      albuterol (2.5 MG/3ML) 0.083% nebulizer solution  Commonly known as: PROVENTIL   2.5 mg, Nebulization, Every 6 Hours PRN      aspirin-dipyridamole  MG per 12 hr capsule  Commonly known as: AGGRENOX   1 capsule, Oral, 2 Times Daily      budesonide-formoterol 160-4.5 MCG/ACT inhaler  Commonly known as: SYMBICORT   Inhale 2 puffs by mouth 2 (Two) Times a Day for 30 days.      coenzyme Q10 100 MG capsule   100 mg, Oral, Every Other Day      folic acid 400 MCG tablet  Commonly known as: FOLVITE   400 mcg, Oral, Every Other Day      rosuvastatin 40 MG tablet  Commonly known as: CRESTOR   40 mg, Oral, Every Other Day               Discharge Diet:   Dietary Orders (From admission, onward)     Start     Ordered    12/22/22 1200  Dietary Nutrition Supplements Boost Plus (Ensure Enlive, Ensure Plus)  Daily With Breakfast, Lunch & Dinner      Question:  Select Supplement  Answer:  Boost Plus (Ensure Enlive, Ensure Plus)    12/22/22 1127    12/22/22 1128  Diet: Regular/House Diet; Texture: Regular Texture (IDDSI 7); Fluid Consistency: Thin (IDDSI 0)  Diet Effective Now        References:    Diet Order Crosswalk   Question Answer Comment   Diets: Regular/House Diet    Texture: Regular Texture (IDDSI 7)    Fluid Consistency: Thin (IDDSI 0)        12/22/22 1128                Activity at Discharge:  activity as tolerated    Follow-up Appointments:  Additional Instructions for the Follow-ups that You Need to Schedule     Discharge Follow-up with PCP   As directed       Currently Documented PCP:    Tata Varghese APRN    PCP Phone Number:    [phone]     Follow Up Details: Follow up with CARMELO Tamayo in 1 week.            Follow-up Information     Tata Varghese APRN .    Specialty: Family Medicine  Why: Follow up with  CARMELO Tamayo in 1 week.  Contact information:  [place] [address] [phone]                             Test Results Pending at Discharge:  Pending Labs     Order Current Status    Blood Culture - Blood, Arm, Left Preliminary result    Blood Culture - Blood, Arm, Right Preliminary result           The ASCVD Risk score (Moy JOSUE, et al., 2019) failed to calculate for the following reasons:    Cannot find a previous HDL lab    Cannot find a previous total cholesterol lab      Ashu Chávez DO  12/24/22  08:00 EST      Time: Greater than 30 minutes spent on this discharge.          Electronically signed by Ashu Chávez DO at 12/24/22 2929

## 2022-12-26 NOTE — PAYOR COMM NOTE
CONTACT:   Amy Jeronimo RN  Phone: [phone]  Fax: [phone]    DISCHARGE NOTIFICATION-still pending approval     DISCHARGE TO: home    REF # [id number]  DC DATE: 12/24/22  IF YOU NEED ANYTHING FURTHER PLEASE LET ME KNOW.   THANKS     Florinda Cota (67 y.o. Female)     Date of Birth   1955    Social Security Number       Address   [address] [facility] [address]    Home Phone   [phone]    MRN   [id number]       Buddhist   [facility]    Marital Status                               Admission Date   12/21/22    Admission Type   Emergency    Admitting Provider   Luis Salazar MD    Attending Provider       Department, Room/Bed   [address], [address]/       Discharge Date   12/24/2022    Discharge Disposition   Home or Self Care    Discharge Destination                               Attending Provider: (none)   Allergies: Codeine, Latex, Pletal [Cilostazol]    Isolation: None   Infection: Influenza (12/21/22)   Code Status: Prior    Ht: 154.9 cm (61\")   Wt: 39.8 kg (87 lb 11.2 oz)    Admission Cmt: None   Principal Problem: Influenza A [J10.1]                 Active Insurance as of 12/21/2022     Primary Coverage     Payor Plan Insurance Group Employer/Plan Group    ANTHEM MEDICARE REPLACEMENT ANTHEM MEDICARE ADVANTAGE [id number]     Payor Plan Address Payor Plan Phone Number Payor Plan Fax Number Effective Dates    [address] [phone]  1/1/2022 - None Entered    [facility] [id number]       Subscriber Name Subscriber Birth Date Member ID       FLORINDA COTA 1955 [id number]           Secondary Coverage     Payor Plan Insurance Group Employer/Plan Group    [facility] MEDICAID      Payor Plan Address Payor Plan Phone Number Payor Plan Fax Number Effective Dates    [address] [address] [phone]  2/1/2016 - None Entered    [facility] [id number]       Subscriber Name Subscriber Birth Date Member ID       FLORINDA COTA 1955 [id number]                  Emergency Contacts      (Rel.) Home Phone Work Phone Mobile Phone    Tyra Barr (Daughter) [phone] -- --               Discharge Summary      Ashu Chávez DO at 12/24/22 0800              [facility] DISCHARGE SUMMARY      Date of Admission: 12/21/2022    Date of Discharge: 12/24/2022     PCP: Tata Varghese APRN    Admission Diagnosis:   Please see admission H&P    Discharge Diagnosis:   Sepsis (present on admission)  Acute influenza A  Acute exacerbation of COPD  Acute on chronic thrombocytopenia  Chronic hypoxic respiratory failure  Hyperlipidemia  Severe malnutrition  Tobacco abuse    Procedures Performed: None     Consults:   Consults     No orders found from 11/22/2022 to 12/22/2022.            History of Present Illness:  Florinda Barr is a 67 y.o. female who presented to [facility] ED with CC of fever, chills, sore throat, body aches, cough and shortness of breath. Please see admission H&P for complete details.     In the ED, workup revealed sepsis, acute influenza A, an acute COPD exacerbation and acute on chronic thrombocytopenia. Cultures were obtained.      Hospital Course  Florinda Barr was admitted to the med/surg floor for further evaluation and treatment. She was started on Tamiflu, steroids and nebs. Imaging did not reveal any evidence of pneumonia with normal procal and thus antibiotics were not initiated.     Follow up labs revealed improvement in her thrombocytopenia with her acute viral illness thought to be contributing to the acute component. Blood cultures were followed and remained negative. Her sepsis began resolving and she remained clinically stable with stable O2 requirements. IVF's were stopped and expectorants added with improvement in her respiratory status.     Ms. Barr was seen and examined with VENITA Lewis on 12/24. She remained afebrile and hemodynamically stable with stable AM labs. She felt much improved from upon admission and  deemed medically stable for discharge. She was prescribed a course of Tamiflu and steroids. A COPD rescue kit was prescribed at discharge. Instructions were given for follow up with her PCP in 1 week.     Condition on Discharge:  Stable    Vital Signs  Vitals:    12/24/22 0651   BP:    Pulse: 86   Resp: 18   Temp:    SpO2: 95%       Physical Exam:  General:    Awake, alert, in no acute distress, thin, chronically ill appearing    Heart:      Normal S1 and S2. Regular rate and rhythm. No significant murmur, rubs or gallops appreciated.   Lungs:     Respirations regular, even and unlabored. Lungs clear to auscultation B/L. No wheezes, rales or rhonchi.   Abdomen:   Soft and nontender. No guarding, rebound tenderness or  organomegaly noted. Bowel sounds present x 4.   Extremities:  No clubbing, cyanosis or edema noted. Moves UE and LE equally B/L.     Discharge Disposition:   home      Discharge Medications:     Discharge Medications      New Medications      Instructions Start Date   doxycycline 100 MG capsule  Commonly known as: MONODOX   Take 1 capsule by mouth every 12 hours for 7 days as part of COPD Rescue Kit. (Only Start if in YELLOW ZONE.)      guaiFENesin 600 MG 12 hr tablet  Commonly known as: MUCINEX   1,200 mg, Oral, Every 12 Hours Scheduled      ipratropium-albuterol 0.5-2.5 mg/3 ml nebulizer  Commonly known as: DUO-NEB   Take 3 mL by neb every 30 minutes as needed for shortness of air for up to 6 doses. Part of COPD Rescue Kit. (Only Start if in YELLOW ZONE.)      multivitamin tablet tablet   1 tablet, Oral, Daily      oseltamivir 75 MG capsule  Commonly known as: TAMIFLU   75 mg, Oral, Every 12 Hours Scheduled      predniSONE 20 MG tablet  Commonly known as: DELTASONE   40 mg, Oral, Daily      predniSONE 20 MG tablet  Commonly known as: DELTASONE   40 mg, Oral, Daily, Take 2 tablets daily for 5 days as part of COPD Rescue Kit. (Only Start if in YELLOW ZONE.)         Continue These Medications       Instructions Start Date   albuterol sulfate  (90 Base) MCG/ACT inhaler  Commonly known as: PROVENTIL HFA;VENTOLIN HFA;PROAIR HFA   2 puffs, Inhalation, Every 6 Hours PRN      albuterol (2.5 MG/3ML) 0.083% nebulizer solution  Commonly known as: PROVENTIL   2.5 mg, Nebulization, Every 6 Hours PRN      aspirin-dipyridamole  MG per 12 hr capsule  Commonly known as: AGGRENOX   1 capsule, Oral, 2 Times Daily      budesonide-formoterol 160-4.5 MCG/ACT inhaler  Commonly known as: SYMBICORT   Inhale 2 puffs by mouth 2 (Two) Times a Day for 30 days.      coenzyme Q10 100 MG capsule   100 mg, Oral, Every Other Day      folic acid 400 MCG tablet  Commonly known as: FOLVITE   400 mcg, Oral, Every Other Day      rosuvastatin 40 MG tablet  Commonly known as: CRESTOR   40 mg, Oral, Every Other Day               Discharge Diet:   Dietary Orders (From admission, onward)     Start     Ordered    12/22/22 1200  Dietary Nutrition Supplements Boost Plus (Ensure Enlive, Ensure Plus)  Daily With Breakfast, Lunch & Dinner      Question:  Select Supplement  Answer:  Boost Plus (Ensure Enlive, Ensure Plus)    12/22/22 1127    12/22/22 1128  Diet: Regular/House Diet; Texture: Regular Texture (IDDSI 7); Fluid Consistency: Thin (IDDSI 0)  Diet Effective Now        References:    Diet Order [place]   Question Answer Comment   Diets: Regular/House Diet    Texture: Regular Texture (IDDSI 7)    Fluid Consistency: Thin (IDDSI 0)        12/22/22 1128                Activity at Discharge:  activity as tolerated    Follow-up Appointments:  Additional Instructions for the Follow-ups that You Need to Schedule     Discharge Follow-up with PCP   As directed       Currently Documented PCP:    Tata Varghese APRN    PCP Phone Number:    [phone]     Follow Up Details: Follow up with CARMELO Tamayo in 1 week.            Follow-up Information     Tata Varghese APRN .    Specialty: Family Medicine  Why: Follow up with  CARMELO Tamayo in 1 week.  Contact information:  [address] [address] [phone]                             Test Results Pending at Discharge:  Pending Labs     Order Current Status    Blood Culture - Blood, Arm, Left Preliminary result    Blood Culture - Blood, Arm, Right Preliminary result           The ASCVD Risk score (Moy JOSUE, et al., 2019) failed to calculate for the following reasons:    Cannot find a previous HDL lab    Cannot find a previous total cholesterol lab      Ashu Chávez DO  12/24/22  08:00 EST      Time: Greater than 30 minutes spent on this discharge.          Electronically signed by Ashu Chávez DO at 12/24/22 5935

## 2022-12-28 ENCOUNTER — READMISSION MANAGEMENT (OUTPATIENT)
Dept: CALL CENTER | Facility: HOSPITAL | Age: 67
End: 2022-12-28

## 2022-12-28 NOTE — OUTREACH NOTE
Sepsis Week 1 Survey    Flowsheet Row Responses   [facility] patient discharged from? [facility]   Does the patient have one of the following disease processes/diagnoses(primary or secondary)? Sepsis   Week 1 attempt successful? Yes   Call start time 1414   Call end time 1423   Discharge diagnosis Sepsis    Meds reviewed with patient/caregiver? Yes   Is the patient having any side effects they believe may be caused by any medication additions or changes? No   Does the patient have all medications related to this admission filled (includes all antibiotics, inhalers, nebulizers,steroids,etc.) Yes   Is the patient taking all medications as directed (includes completed medication regime)? Yes   Does the patient have a primary care provider?  Yes   Comments regarding PCP Appt with PCP 1/3/2023   Does the patient have an appointment with their PCP within 7 days of discharge? Greater than 7 days   Nursing Interventions Verified appointment date/time/provider   Has the patient kept scheduled appointments due by today? N/A   Has home health visited the patient within 72 hours of discharge? N/A   DME comments Using O2 at 2 lpm per nc-sats 94-95% but decrease with exertion.  Reports her tubing is only about 12 ft long and will not reach her bedroom but her son is going to stop by today to see if he can locate her tubing for her but she will call her DME company for more if needed   Psychosocial issues? No   Did the patient receive a copy of their discharge instructions? Yes   Nursing interventions Reviewed instructions with patient   What is the patient's perception of their health status since discharge? Same  [Reports about the same--still has BREAUX.  Reports she didn't realize how sick she was at the time of admit]   Nursing interventions Nurse provided patient education   Is the patient/caregiver able to teach back TIME? T emperature - higher or lower than normal, I nfection - may have signs and symptoms of an infection, M  ental Decline - confused, sleepy, difficult to arouse, E xtremely Ill - severe pain, discomfort, shortness of breath  [reviewed]   Nursing interventions Nurse provided patient education   Is patient/caregiver able to teach back steps to recovery at home? Set small, achievable goals for return to baseline health, Rest and regain strength   Is the patient/caregiver able to teach back signs and symptoms of worsening condition: Fever, Shortness of breath/rapid respiratory rate, Edema, Altered mental status(confusion/coma)   Is the patient/caregiver able to teach back the hierarchy of who to call/visit for symptoms/problems? PCP, Specialist, Home health nurse, Urgent Care, ED, 911 Yes   Additional teach back comments Pt had questions about cost of Boost--researched some options for her   Week 1 call completed? Yes          JASON RANGEL - Registered Nurse

## 2023-01-06 ENCOUNTER — READMISSION MANAGEMENT (OUTPATIENT)
Dept: CALL CENTER | Facility: HOSPITAL | Age: 68
End: 2023-01-06
Payer: MEDICARE

## 2023-01-06 NOTE — OUTREACH NOTE
Sepsis Week 2 Survey    Flowsheet Row Responses   [facility] patient discharged from? [facility]   Does the patient have one of the following disease processes/diagnoses(primary or secondary)? Sepsis   Week 2 attempt successful? Yes   Call start time 1434   Call end time 1437   Discharge diagnosis Sepsis    Meds reviewed with patient/caregiver? Yes   Is the patient taking all medications as directed (includes completed medication regime)? Yes   Does the patient have a primary care provider?  Yes   Comments regarding PCP Appt with PCP 1/3/2023   Has the patient kept scheduled appointments due by today? Yes   Has home health visited the patient within 72 hours of discharge? N/A   Psychosocial issues? No   Did the patient receive a copy of their discharge instructions? Yes   What is the patient's perception of their health status since discharge? Improving   Nursing interventions Nurse provided patient education   Is the patient/caregiver able to teach back TIME? T emperature - higher or lower than normal, I nfection - may have signs and symptoms of an infection, M ental Decline - confused, sleepy, difficult to arouse, E xtremely Ill - severe pain, discomfort, shortness of breath   Nursing interventions Nurse provided reassurance to patient   Is patient/caregiver able to teach back steps to recovery at home? Rest and regain strength, Set small, achievable goals for return to baseline health, Eat a balanced diet   Is the patient/caregiver able to teach back signs and symptoms of worsening condition: Fever, Altered mental status(confusion/coma), Shortness of breath/rapid respiratory rate   Is the patient/caregiver able to teach back the hierarchy of who to call/visit for symptoms/problems? PCP, Specialist, Home health nurse, Urgent Care, ED, 911 Yes   Week 2 call completed? Yes   Wrap up additional comments Pt reports she is improving. pt is aware of when to use resue kit that she received. Pt conitnues to use O2 and  does have a longer tubing now.           ERNST BUTT - Registered Nurse

## 2023-01-16 ENCOUNTER — READMISSION MANAGEMENT (OUTPATIENT)
Dept: CALL CENTER | Facility: HOSPITAL | Age: 68
End: 2023-01-16
Payer: MEDICARE

## 2023-01-16 NOTE — OUTREACH NOTE
"Sepsis Week 3 Survey    Flowsheet Row Responses   [facility] patient discharged from? [facility]   Does the patient have one of the following disease processes/diagnoses(primary or secondary)? Sepsis   Week 3 attempt successful? Yes   Call start time 1641   Call end time 1644   Discharge diagnosis Sepsis    Person spoke with today (if not patient) and relationship Tyra daughter   Meds reviewed with patient/caregiver? Yes   Is the patient having any side effects they believe may be caused by any medication additions or changes? No   Does the patient have all medications related to this admission filled (includes all antibiotics, inhalers, nebulizers,steroids,etc.) Yes   Is the patient taking all medications as directed (includes completed medication regime)? Yes   Does the patient have a primary care provider?  Yes   Comments regarding PCP Dtr thinks patient cancelled PCP appt due to not feeling well.   Has the patient kept scheduled appointments due by today? Yes   Psychosocial issues? No   Did the patient receive a copy of their discharge instructions? Yes   Nursing interventions Reviewed instructions with patient   What is the patient's perception of their health status since discharge? Improving   If the patient is a current smoker, are they able to teach back resources for cessation? --  [Daughter states patient has reduced her amount of cigarrettes per day since discharge]   Is the patient/caregiver able to teach back the hierarchy of who to call/visit for symptoms/problems? PCP, Specialist, Home health nurse, Urgent Care, ED, 911 Yes   Week 3 call completed? Yes   Wrap up additional comments Dtr states patient is doing ok,she still has some \"breathing issues\".  Advised to call PCP to reschedule as soon as possible.          KARLENE MARTÍNEZ - Registered Nurse  "

## 2023-01-25 ENCOUNTER — READMISSION MANAGEMENT (OUTPATIENT)
Dept: CALL CENTER | Facility: HOSPITAL | Age: 68
End: 2023-01-25
Payer: MEDICARE

## 2023-01-25 NOTE — OUTREACH NOTE
Sepsis Week 4 Survey    Flowsheet Row Responses   [facility] patient discharged from? [facility]   Does the patient have one of the following disease processes/diagnoses(primary or secondary)? Sepsis   Week 4 attempt successful? Yes   Call start time 1055   Call end time 1057   Discharge diagnosis Sepsis    Person spoke with today (if not patient) and relationship patient   Meds reviewed with patient/caregiver? Yes   Prescription comments Patient still on steriod and abx   Is the patient taking all medications as directed (includes completed medication regime)? Yes   Has the patient kept scheduled appointments due by today? Yes   Comments Patient has alot of congestion, Sees pcp on monday/ tuesday   Is the patient still receiving Home Health Services? N/A   Psychosocial issues? No   What is the patient's perception of their health status since discharge? Worsening   Nursing interventions Advised patient to call provider   Is the patient/caregiver able to teach back TIME? T emperature - higher or lower than normal, M ental Decline - confused, sleepy, difficult to arouse, I nfection - may have signs and symptoms of an infection, E xtremely Ill - severe pain, discomfort, shortness of breath   Is patient/caregiver able to teach back steps to recovery at home? Rest and regain strength, Set small, achievable goals for return to baseline health   Is the patient/caregiver able to teach back signs and symptoms of worsening condition: Fever, Altered mental status(confusion/coma), Shortness of breath/rapid respiratory rate   Is the patient/caregiver able to teach back the hierarchy of who to call/visit for symptoms/problems? PCP, Specialist, Home health nurse, Urgent Care, ED, 911 Yes   Week 4 call completed? Yes   Would the patient like one additional call? No   Wrap up additional comments Patient going to see pcp next week for worsening cough and congestion.           HAYDEN LYNN - Registered Nurse

## 2023-12-06 ENCOUNTER — APPOINTMENT (OUTPATIENT)
Dept: CT IMAGING | Facility: HOSPITAL | Age: 68
End: 2023-12-06
Payer: MEDICARE

## 2023-12-06 ENCOUNTER — HOSPITAL ENCOUNTER (EMERGENCY)
Facility: HOSPITAL | Age: 68
Discharge: HOME | End: 2023-12-06
Attending: EMERGENCY MEDICINE
Payer: MEDICARE

## 2023-12-06 ENCOUNTER — APPOINTMENT (OUTPATIENT)
Dept: GENERAL RADIOLOGY | Facility: HOSPITAL | Age: 68
End: 2023-12-06
Payer: MEDICARE

## 2023-12-06 VITALS
SYSTOLIC BLOOD PRESSURE: 124 MMHG | DIASTOLIC BLOOD PRESSURE: 64 MMHG | WEIGHT: 75 LBS | HEART RATE: 79 BPM | HEIGHT: 61 IN | OXYGEN SATURATION: 96 % | BODY MASS INDEX: 14.16 KG/M2 | RESPIRATION RATE: 24 BRPM | TEMPERATURE: 98.2 F

## 2023-12-06 DIAGNOSIS — N39.0 ACUTE UTI: ICD-10-CM

## 2023-12-06 DIAGNOSIS — J44.1 COPD EXACERBATION: Primary | ICD-10-CM

## 2023-12-06 LAB
A-A DO2: 64.5 MMHG (ref 0–300)
ALBUMIN SERPL-MCNC: 3.5 G/DL (ref 3.5–5.2)
ALBUMIN/GLOB SERPL: 0.9 G/DL
ALP SERPL-CCNC: 105 U/L (ref 39–117)
ALT SERPL W P-5'-P-CCNC: 16 U/L (ref 1–33)
ANION GAP SERPL CALCULATED.3IONS-SCNC: 11.3 MMOL/L (ref 5–15)
APTT PPP: 31.2 SECONDS (ref 26.5–34.5)
ARTERIAL PATENCY WRIST A: ABNORMAL
AST SERPL-CCNC: 27 U/L (ref 1–32)
ATMOSPHERIC PRESS: 733 MMHG
BACTERIA UR QL AUTO: ABNORMAL /HPF
BASE EXCESS BLDA CALC-SCNC: 7.5 MMOL/L (ref 0–2)
BASOPHILS # BLD AUTO: 0.04 10*3/MM3 (ref 0–0.2)
BASOPHILS NFR BLD AUTO: 0.5 % (ref 0–1.5)
BDY SITE: ABNORMAL
BILIRUB SERPL-MCNC: 1.3 MG/DL (ref 0–1.2)
BILIRUB UR QL STRIP: ABNORMAL
BUN SERPL-MCNC: 18 MG/DL (ref 8–23)
BUN/CREAT SERPL: 28.6 (ref 7–25)
CALCIUM SPEC-SCNC: 9.4 MG/DL (ref 8.6–10.5)
CHLORIDE SERPL-SCNC: 97 MMOL/L (ref 98–107)
CLARITY UR: CLEAR
CO2 BLDA-SCNC: 34.2 MMOL/L (ref 22–33)
CO2 SERPL-SCNC: 31.7 MMOL/L (ref 22–29)
COHGB MFR BLD: 1.8 % (ref 0–5)
COLOR UR: ABNORMAL
CREAT SERPL-MCNC: 0.63 MG/DL (ref 0.57–1)
CRP SERPL-MCNC: 17.03 MG/DL (ref 0–0.5)
D-LACTATE SERPL-SCNC: 1.5 MMOL/L (ref 0.5–2)
DEPRECATED RDW RBC AUTO: 50.8 FL (ref 37–54)
EGFRCR SERPLBLD CKD-EPI 2021: 96.2 ML/MIN/1.73
EOSINOPHIL # BLD AUTO: 0 10*3/MM3 (ref 0–0.4)
EOSINOPHIL NFR BLD AUTO: 0 % (ref 0.3–6.2)
ERYTHROCYTE [DISTWIDTH] IN BLOOD BY AUTOMATED COUNT: 12.5 % (ref 12.3–15.4)
FLUAV RNA RESP QL NAA+PROBE: NOT DETECTED
FLUBV RNA RESP QL NAA+PROBE: NOT DETECTED
GAS FLOW AIRWAY: 2 LPM
GLOBULIN UR ELPH-MCNC: 4 GM/DL
GLUCOSE SERPL-MCNC: 117 MG/DL (ref 65–99)
GLUCOSE UR STRIP-MCNC: NEGATIVE MG/DL
HCO3 BLDA-SCNC: 32.7 MMOL/L (ref 20–26)
HCT VFR BLD AUTO: 43.9 % (ref 34–46.6)
HCT VFR BLD CALC: 43.7 % (ref 38–51)
HGB BLD-MCNC: 14.3 G/DL (ref 12–15.9)
HGB BLDA-MCNC: 14.3 G/DL (ref 13.5–17.5)
HGB UR QL STRIP.AUTO: ABNORMAL
HYALINE CASTS UR QL AUTO: ABNORMAL /LPF
IMM GRANULOCYTES # BLD AUTO: 0.11 10*3/MM3 (ref 0–0.05)
IMM GRANULOCYTES NFR BLD AUTO: 1.3 % (ref 0–0.5)
INHALED O2 CONCENTRATION: 28 %
INR PPP: 1.03 (ref 0.9–1.1)
KETONES UR QL STRIP: ABNORMAL
LEUKOCYTE ESTERASE UR QL STRIP.AUTO: ABNORMAL
LYMPHOCYTES # BLD AUTO: 0.59 10*3/MM3 (ref 0.7–3.1)
LYMPHOCYTES NFR BLD AUTO: 6.9 % (ref 19.6–45.3)
Lab: ABNORMAL
MCH RBC QN AUTO: 35.3 PG (ref 26.6–33)
MCHC RBC AUTO-ENTMCNC: 32.6 G/DL (ref 31.5–35.7)
MCV RBC AUTO: 108.4 FL (ref 79–97)
METHGB BLD QL: 0.1 % (ref 0–3)
MODALITY: ABNORMAL
MONOCYTES # BLD AUTO: 0.98 10*3/MM3 (ref 0.1–0.9)
MONOCYTES NFR BLD AUTO: 11.5 % (ref 5–12)
NEUTROPHILS NFR BLD AUTO: 6.8 10*3/MM3 (ref 1.7–7)
NEUTROPHILS NFR BLD AUTO: 79.8 % (ref 42.7–76)
NITRITE UR QL STRIP: POSITIVE
NRBC BLD AUTO-RTO: 0 /100 WBC (ref 0–0.2)
NT-PROBNP SERPL-MCNC: 333 PG/ML (ref 0–900)
OXYHGB MFR BLDV: 94.9 % (ref 94–99)
PCO2 BLDA: 46.9 MM HG (ref 35–45)
PCO2 TEMP ADJ BLD: ABNORMAL MM[HG]
PH BLDA: 7.45 PH UNITS (ref 7.35–7.45)
PH UR STRIP.AUTO: 5.5 [PH] (ref 5–8)
PH, TEMP CORRECTED: ABNORMAL
PLATELET # BLD AUTO: 118 10*3/MM3 (ref 140–450)
PMV BLD AUTO: 10.9 FL (ref 6–12)
PO2 BLDA: 75.2 MM HG (ref 83–108)
PO2 TEMP ADJ BLD: ABNORMAL MM[HG]
POTASSIUM SERPL-SCNC: 3.5 MMOL/L (ref 3.5–5.2)
PROCALCITONIN SERPL-MCNC: 0.36 NG/ML (ref 0–0.25)
PROT SERPL-MCNC: 7.5 G/DL (ref 6–8.5)
PROT UR QL STRIP: ABNORMAL
PROTHROMBIN TIME: 14 SECONDS (ref 12.1–14.7)
QT INTERVAL: 338 MS
QTC INTERVAL: 444 MS
RBC # BLD AUTO: 4.05 10*6/MM3 (ref 3.77–5.28)
RBC # UR STRIP: ABNORMAL /HPF
REF LAB TEST METHOD: ABNORMAL
SAO2 % BLDCOA: 96.7 % (ref 94–99)
SARS-COV-2 RNA RESP QL NAA+PROBE: NOT DETECTED
SODIUM SERPL-SCNC: 140 MMOL/L (ref 136–145)
SP GR UR STRIP: 1.03 (ref 1–1.03)
SQUAMOUS #/AREA URNS HPF: ABNORMAL /HPF
TROPONIN T SERPL HS-MCNC: <6 NG/L
UROBILINOGEN UR QL STRIP: ABNORMAL
VENTILATOR MODE: ABNORMAL
WBC # UR STRIP: ABNORMAL /HPF
WBC NRBC COR # BLD AUTO: 8.52 10*3/MM3 (ref 3.4–10.8)

## 2023-12-06 PROCEDURE — 99284 EMERGENCY DEPT VISIT MOD MDM: CPT

## 2023-12-06 PROCEDURE — 94799 UNLISTED PULMONARY SVC/PX: CPT

## 2023-12-06 PROCEDURE — 85730 THROMBOPLASTIN TIME PARTIAL: CPT | Performed by: NURSE PRACTITIONER

## 2023-12-06 PROCEDURE — 84484 ASSAY OF TROPONIN QUANT: CPT | Performed by: NURSE PRACTITIONER

## 2023-12-06 PROCEDURE — 86140 C-REACTIVE PROTEIN: CPT | Performed by: NURSE PRACTITIONER

## 2023-12-06 PROCEDURE — 84145 PROCALCITONIN (PCT): CPT | Performed by: NURSE PRACTITIONER

## 2023-12-06 PROCEDURE — 25010000002 METHYLPREDNISOLONE PER 125 MG: Performed by: NURSE PRACTITIONER

## 2023-12-06 PROCEDURE — 87636 SARSCOV2 & INF A&B AMP PRB: CPT | Performed by: NURSE PRACTITIONER

## 2023-12-06 PROCEDURE — 82375 ASSAY CARBOXYHB QUANT: CPT

## 2023-12-06 PROCEDURE — 83050 HGB METHEMOGLOBIN QUAN: CPT

## 2023-12-06 PROCEDURE — 74176 CT ABD & PELVIS W/O CONTRAST: CPT | Performed by: RADIOLOGY

## 2023-12-06 PROCEDURE — 36415 COLL VENOUS BLD VENIPUNCTURE: CPT

## 2023-12-06 PROCEDURE — 74176 CT ABD & PELVIS W/O CONTRAST: CPT

## 2023-12-06 PROCEDURE — 83880 ASSAY OF NATRIURETIC PEPTIDE: CPT | Performed by: NURSE PRACTITIONER

## 2023-12-06 PROCEDURE — 25810000003 SODIUM CHLORIDE 0.9 % SOLUTION: Performed by: NURSE PRACTITIONER

## 2023-12-06 PROCEDURE — 96365 THER/PROPH/DIAG IV INF INIT: CPT

## 2023-12-06 PROCEDURE — 83605 ASSAY OF LACTIC ACID: CPT | Performed by: NURSE PRACTITIONER

## 2023-12-06 PROCEDURE — 71045 X-RAY EXAM CHEST 1 VIEW: CPT | Performed by: RADIOLOGY

## 2023-12-06 PROCEDURE — 96375 TX/PRO/DX INJ NEW DRUG ADDON: CPT

## 2023-12-06 PROCEDURE — 82805 BLOOD GASES W/O2 SATURATION: CPT

## 2023-12-06 PROCEDURE — 85610 PROTHROMBIN TIME: CPT | Performed by: NURSE PRACTITIONER

## 2023-12-06 PROCEDURE — 96361 HYDRATE IV INFUSION ADD-ON: CPT

## 2023-12-06 PROCEDURE — 80053 COMPREHEN METABOLIC PANEL: CPT | Performed by: NURSE PRACTITIONER

## 2023-12-06 PROCEDURE — 93005 ELECTROCARDIOGRAM TRACING: CPT | Performed by: NURSE PRACTITIONER

## 2023-12-06 PROCEDURE — 85025 COMPLETE CBC W/AUTO DIFF WBC: CPT | Performed by: NURSE PRACTITIONER

## 2023-12-06 PROCEDURE — 87040 BLOOD CULTURE FOR BACTERIA: CPT | Performed by: NURSE PRACTITIONER

## 2023-12-06 PROCEDURE — 25010000002 CEFTRIAXONE PER 250 MG: Performed by: NURSE PRACTITIONER

## 2023-12-06 PROCEDURE — 87086 URINE CULTURE/COLONY COUNT: CPT | Performed by: NURSE PRACTITIONER

## 2023-12-06 PROCEDURE — 94640 AIRWAY INHALATION TREATMENT: CPT

## 2023-12-06 PROCEDURE — 36600 WITHDRAWAL OF ARTERIAL BLOOD: CPT

## 2023-12-06 PROCEDURE — 71045 X-RAY EXAM CHEST 1 VIEW: CPT

## 2023-12-06 PROCEDURE — 81001 URINALYSIS AUTO W/SCOPE: CPT | Performed by: NURSE PRACTITIONER

## 2023-12-06 RX ORDER — ONDANSETRON 4 MG/1
4 TABLET, ORALLY DISINTEGRATING ORAL EVERY 6 HOURS PRN
Qty: 12 TABLET | Refills: 0 | Status: SHIPPED | OUTPATIENT
Start: 2023-12-06

## 2023-12-06 RX ORDER — METHYLPREDNISOLONE 4 MG/1
TABLET ORAL
Qty: 1 EACH | Refills: 0 | Status: SHIPPED | OUTPATIENT
Start: 2023-12-06

## 2023-12-06 RX ORDER — SODIUM CHLORIDE 0.9 % (FLUSH) 0.9 %
10 SYRINGE (ML) INJECTION AS NEEDED
Status: DISCONTINUED | OUTPATIENT
Start: 2023-12-06 | End: 2023-12-06 | Stop reason: HOSPADM

## 2023-12-06 RX ORDER — METHYLPREDNISOLONE SODIUM SUCCINATE 125 MG/2ML
80 INJECTION, POWDER, LYOPHILIZED, FOR SOLUTION INTRAMUSCULAR; INTRAVENOUS ONCE
Status: COMPLETED | OUTPATIENT
Start: 2023-12-06 | End: 2023-12-06

## 2023-12-06 RX ORDER — AMOXICILLIN AND CLAVULANATE POTASSIUM 875; 125 MG/1; MG/1
1 TABLET, FILM COATED ORAL 2 TIMES DAILY
Qty: 14 TABLET | Refills: 0 | Status: SHIPPED | OUTPATIENT
Start: 2023-12-06 | End: 2023-12-13

## 2023-12-06 RX ORDER — IPRATROPIUM BROMIDE AND ALBUTEROL SULFATE 2.5; .5 MG/3ML; MG/3ML
3 SOLUTION RESPIRATORY (INHALATION) ONCE
Status: COMPLETED | OUTPATIENT
Start: 2023-12-06 | End: 2023-12-06

## 2023-12-06 RX ADMIN — METHYLPREDNISOLONE SODIUM SUCCINATE 80 MG: 125 INJECTION, POWDER, FOR SOLUTION INTRAMUSCULAR; INTRAVENOUS at 12:25

## 2023-12-06 RX ADMIN — CEFTRIAXONE 1000 MG: 1 INJECTION, POWDER, FOR SOLUTION INTRAMUSCULAR; INTRAVENOUS at 14:26

## 2023-12-06 RX ADMIN — SODIUM CHLORIDE 500 ML: 9 INJECTION, SOLUTION INTRAVENOUS at 12:24

## 2023-12-06 RX ADMIN — IPRATROPIUM BROMIDE AND ALBUTEROL SULFATE 3 ML: .5; 2.5 SOLUTION RESPIRATORY (INHALATION) at 11:55

## 2023-12-06 NOTE — DISCHARGE INSTRUCTIONS
Follow up with your primary care provider in 1-2 days.    Continue oxygen and nebulizer treatments as prescribed by your physician.     Return to the emergency room for worsening symptoms.

## 2023-12-06 NOTE — ED PROVIDER NOTES
Subjective   History of Present Illness  Patient is a 68-year-old female who presents today complaining of weakness, nausea, vomiting, and shortness of breath.  Patient does report a longstanding history of COPD and wears 2 L of oxygen per nasal cannula continuously.  Patient reports that her daughter who is a CNA has been adjusting her oxygen at times and states it has been increased at times.  Patient reports she is a smoker and reports a quarter of a pack a day for about 50 years.  Patient has a history of peripheral vascular disease, hyperlipidemia, and has had a stroke in the past.  Patient reports that she is felt bad for about a week.  Patient states that she has been able to eat states that she has been able to keep anything down.  Patient denies chest pain currently.  Patient does report shortness of breath states that it is worse with exertion.        Review of Systems   Constitutional: Negative.    HENT: Negative.     Eyes: Negative.    Respiratory: Negative.  Positive for cough and shortness of breath.    Cardiovascular: Negative.    Gastrointestinal: Negative.  Positive for nausea.   Endocrine: Negative.    Genitourinary: Negative.    Musculoskeletal: Negative.    Skin: Negative.    Allergic/Immunologic: Negative.    Neurological: Negative.    Hematological: Negative.    Psychiatric/Behavioral: Negative.         Past Medical History:   Diagnosis Date    COPD (chronic obstructive pulmonary disease)     Hyperlipidemia     Peripheral vascular disease     Stroke        Allergies   Allergen Reactions    Codeine Swelling    Latex Hives    Pletal [Cilostazol] Rash     Welps       Past Surgical History:   Procedure Laterality Date    CARDIAC CATHETERIZATION Left 4/6/2021    Procedure: Peripheral angiography;  Surgeon: Dae Callahan MD;  Location: [facility] INVASIVE LOCATION;  Service: Cardiovascular;  Laterality: Left;       Family History   Problem Relation Age of Onset    Heart disease Mother      Hypertension Mother     Diabetes Mother        Social History     Socioeconomic History    Marital status:     Number of children: 3   Tobacco Use    Smoking status: Every Day     Packs/day: 0.25     Years: 50.00     Additional pack years: 0.00     Total pack years: 12.50     Types: Cigarettes    Smokeless tobacco: Never   Substance and Sexual Activity    Alcohol use: No    Drug use: No    Sexual activity: Defer           Objective   Physical Exam  Vitals and nursing note reviewed.   Constitutional:       Appearance: She is well-developed.   HENT:      Head: Normocephalic.      Right Ear: External ear normal.      Left Ear: External ear normal.   Eyes:      Conjunctiva/sclera: Conjunctivae normal.      Pupils: Pupils are equal, round, and reactive to light.   Cardiovascular:      Rate and Rhythm: Regular rhythm. Tachycardia present.      Heart sounds: Normal heart sounds.   Pulmonary:      Effort: Pulmonary effort is normal. Tachypnea present.      Breath sounds: Wheezing present.   Abdominal:      General: Bowel sounds are normal.      Palpations: Abdomen is soft.   Musculoskeletal:         General: Normal range of motion.      Cervical back: Normal range of motion and neck supple.   Skin:     General: Skin is warm and dry.      Capillary Refill: Capillary refill takes less than 2 seconds.   Neurological:      Mental Status: She is alert and oriented to person, place, and time.   Psychiatric:         Behavior: Behavior normal.         Thought Content: Thought content normal.         Procedures           ED Course  ED Course as of 12/06/23 1344   Wed Dec 06, 2023   1241 ECG 12 Lead Other; tachycardia  Vent. Rate : 104 BPM     Atrial Rate : 104 BPM     P-R Int : 126 ms          QRS Dur :  78 ms      QT Int : 338 ms       P-R-T Axes :  86  88  83 degrees     QTc Int : 444 ms     Sinus tachycardia  Biatrial enlargement  Pulmonary disease pattern  Abnormal ECG  When compared with ECG of 21-DEC-2022  20:44,  No significant change was found   [ES]   1339 Patient reports feeling much better. Explained she should follow up with PCP and return to the emergency room immediately for worsening symptoms. Discussed symptoms that would warrant immediate return at length..  [CHUY]      ED Course User Index  [ES] Krzysztof Almonte MD  [CHUY] Roel Dacosta, APRN                                 Results for orders placed or performed during the hospital encounter of 12/06/23   COVID-19 and FLU A/B PCR, 1 HR TAT - Swab, Nasopharynx    Specimen: Nasopharynx; Swab   Result Value Ref Range    COVID19 Not Detected Not Detected - Ref. Range    Influenza A PCR Not Detected Not Detected    Influenza B PCR Not Detected Not Detected   Comprehensive Metabolic Panel    Specimen: Arm, Right; Blood   Result Value Ref Range    Glucose 117 (H) 65 - 99 mg/dL    BUN 18 8 - 23 mg/dL    Creatinine 0.63 0.57 - 1.00 mg/dL    Sodium 140 136 - 145 mmol/L    Potassium 3.5 3.5 - 5.2 mmol/L    Chloride 97 (L) 98 - 107 mmol/L    CO2 31.7 (H) 22.0 - 29.0 mmol/L    Calcium 9.4 8.6 - 10.5 mg/dL    Total Protein 7.5 6.0 - 8.5 g/dL    Albumin 3.5 3.5 - 5.2 g/dL    ALT (SGPT) 16 1 - 33 U/L    AST (SGOT) 27 1 - 32 U/L    Alkaline Phosphatase 105 39 - 117 U/L    Total Bilirubin 1.3 (H) 0.0 - 1.2 mg/dL    Globulin 4.0 gm/dL    A/G Ratio 0.9 g/dL    BUN/Creatinine Ratio 28.6 (H) 7.0 - 25.0    Anion Gap 11.3 5.0 - 15.0 mmol/L    eGFR 96.2 >60.0 mL/min/1.73   Protime-INR    Specimen: Arm, Right; Blood   Result Value Ref Range    Protime 14.0 12.1 - 14.7 Seconds    INR 1.03 0.90 - 1.10   aPTT    Specimen: Arm, Right; Blood   Result Value Ref Range    PTT 31.2 26.5 - 34.5 seconds   Urinalysis With Culture If Indicated - Urine, Clean Catch    Specimen: Urine, Clean Catch   Result Value Ref Range    Color, UA Orange (A) Yellow, Straw    Appearance, UA Clear Clear    pH, UA 5.5 5.0 - 8.0    Specific Gravity, UA 1.028 1.005 - 1.030    Glucose, UA Negative  Negative    Ketones, UA Trace (A) Negative    Bilirubin, UA Small (1+) (A) Negative    Blood, UA Small (1+) (A) Negative    Protein,  mg/dL (2+) (A) Negative    Leuk Esterase, UA Moderate (2+) (A) Negative    Nitrite, UA Positive (A) Negative    Urobilinogen, UA 1.0 E.U./dL 0.2 - 1.0 E.U./dL   Lactic Acid, Plasma    Specimen: Arm, Right; Blood   Result Value Ref Range    Lactate 1.5 0.5 - 2.0 mmol/L   High Sensitivity Troponin T    Specimen: Arm, Right; Blood   Result Value Ref Range    HS Troponin T <6 <14 ng/L   BNP    Specimen: Arm, Right; Blood   Result Value Ref Range    proBNP 333.0 0.0 - 900.0 pg/mL   C-reactive Protein    Specimen: Arm, Right; Blood   Result Value Ref Range    C-Reactive Protein 17.03 (H) 0.00 - 0.50 mg/dL   Procalcitonin    Specimen: Arm, Right; Blood   Result Value Ref Range    Procalcitonin 0.36 (H) 0.00 - 0.25 ng/mL   CBC Auto Differential    Specimen: Arm, Right; Blood   Result Value Ref Range    WBC 8.52 3.40 - 10.80 10*3/mm3    RBC 4.05 3.77 - 5.28 10*6/mm3    Hemoglobin 14.3 12.0 - 15.9 g/dL    Hematocrit 43.9 34.0 - 46.6 %    .4 (H) 79.0 - 97.0 fL    MCH 35.3 (H) 26.6 - 33.0 pg    MCHC 32.6 31.5 - 35.7 g/dL    RDW 12.5 12.3 - 15.4 %    RDW-SD 50.8 37.0 - 54.0 fl    MPV 10.9 6.0 - 12.0 fL    Platelets 118 (L) 140 - 450 10*3/mm3    Neutrophil % 79.8 (H) 42.7 - 76.0 %    Lymphocyte % 6.9 (L) 19.6 - 45.3 %    Monocyte % 11.5 5.0 - 12.0 %    Eosinophil % 0.0 (L) 0.3 - 6.2 %    Basophil % 0.5 0.0 - 1.5 %    Immature Grans % 1.3 (H) 0.0 - 0.5 %    Neutrophils, Absolute 6.80 1.70 - 7.00 10*3/mm3    Lymphocytes, Absolute 0.59 (L) 0.70 - 3.10 10*3/mm3    Monocytes, Absolute 0.98 (H) 0.10 - 0.90 10*3/mm3    Eosinophils, Absolute 0.00 0.00 - 0.40 10*3/mm3    Basophils, Absolute 0.04 0.00 - 0.20 10*3/mm3    Immature Grans, Absolute 0.11 (H) 0.00 - 0.05 10*3/mm3    nRBC 0.0 0.0 - 0.2 /100 WBC   Blood Gas, Arterial With Co-Ox    Specimen: Arterial Blood   Result Value Ref Range     Site Left Brachial     Norman's Test N/A     pH, Arterial 7.452 (H) 7.350 - 7.450 pH units    pCO2, Arterial 46.9 (H) 35.0 - 45.0 mm Hg    pO2, Arterial 75.2 (L) 83.0 - 108.0 mm Hg    HCO3, Arterial 32.7 (H) 20.0 - 26.0 mmol/L    Base Excess, Arterial 7.5 (H) 0.0 - 2.0 mmol/L    O2 Saturation, Arterial 96.7 94.0 - 99.0 %    Hemoglobin, Blood Gas 14.3 13.5 - 17.5 g/dL    Hematocrit, Blood Gas 43.7 38.0 - 51.0 %    Oxyhemoglobin 94.9 94 - 99 %    Methemoglobin 0.10 0.00 - 3.00 %    Carboxyhemoglobin 1.8 0 - 5 %    A-a DO2 64.5 0.0 - 300.0 mmHg    CO2 Content 34.2 (H) 22 - 33 mmol/L    Barometric Pressure for Blood Gas 733 mmHg    Modality Nasal Cannula     FIO2 28 %    Flow Rate 2.0 lpm    Ventilator Mode NA     Collected by [id number]     pH, Temp Corrected      pCO2, Temperature Corrected      pO2, Temperature Corrected     Urinalysis, Microscopic Only - Urine, Clean Catch    Specimen: Urine, Clean Catch   Result Value Ref Range    RBC, UA 6-10 (A) None Seen, 0-2 /HPF    WBC, UA 6-10 (A) None Seen, 0-2 /HPF    Bacteria, UA 1+ (A) None Seen /HPF    Squamous Epithelial Cells, UA 7-12 (A) None Seen, 0-2 /HPF    Hyaline Casts, UA 7-12 None Seen /LPF    Methodology Manual Light Microscopy    ECG 12 Lead Other; tachycardia   Result Value Ref Range    QT Interval 338 ms    QTC Interval 444 ms     CT Abdomen Pelvis Without Contrast   Final Result       1. Markedly limited study due to the lack of peritoneal fat, lack of IV   and oral contrast.       2. No definitive source for the patient's symptoms.                                   This report was finalized on 12/6/2023 1:20 PM by Dr. Desean Che MD.          XR Chest AP   Final Result   COPD               This report was finalized on 12/6/2023 12:19 PM by Dr. Desean Che MD.            .            Medical Decision Making  Patient is a 68-year-old female who presents today complaining of weakness, nausea, vomiting, and shortness of breath.  Patient does report a  longstanding history of COPD and wears 2 L of oxygen per nasal cannula continuously.  Patient reports that her daughter who is a CNA has been adjusting her oxygen at times and states it has been increased at times.  Patient reports she is a smoker and reports a quarter of a pack a day for about 50 years.  Patient has a history of peripheral vascular disease, hyperlipidemia, and has had a stroke in the past.  Patient reports that she is felt bad for about a week.  Patient states that she has been able to eat states that she has been able to keep anything down.  Patient denies chest pain currently.      Over the course of the ED stay after IV steroids and breathing treatments patient reports feeling much better.  We discussed possible admission versus discharge.  Patient does report feeling much better and after discussion and factoring in patient condition, bed availability, follow-up patient feels that she is ready to go home currently.    Amount and/or Complexity of Data Reviewed  Labs: ordered. Decision-making details documented in ED Course.  Radiology: ordered. Decision-making details documented in ED Course.  ECG/medicine tests: ordered. Decision-making details documented in ED Course.    Risk  Prescription drug management.        Final diagnoses:   COPD exacerbation   Acute UTI       ED Disposition  ED Disposition       ED Disposition   Discharge    Condition   Stable    Comment   --               Tata Varghese, APRN  [address] [id number] [phone]    Schedule an appointment as soon as possible for a visit in 1 day  For further evaluation         Medication List        New Prescriptions      amoxicillin-clavulanate 875-125 MG per tablet  Commonly known as: AUGMENTIN  Take 1 tablet by mouth 2 (Two) Times a Day for 7 days.     methylPREDNISolone 4 MG dose pack  Commonly known as: MEDROL  Take as directed on package instructions.     ondansetron ODT 4 MG disintegrating  tablet  Commonly known as: ZOFRAN-ODT  Place 1 tablet on the tongue Every 6 (Six) Hours As Needed for Nausea or Vomiting.               Where to Get Your Medications        These medications were sent to [facility] - [place] - [address] [id number] - [phone]  - [phone]   [address] [address], [place] [address]      Phone: [phone]   amoxicillin-clavulanate 875-125 MG per tablet  methylPREDNISolone 4 MG dose pack  ondansetron ODT 4 MG disintegrating tablet            Roel Dacosta, APRN  12/06/23 134

## 2023-12-06 NOTE — ED PROVIDER NOTES
Subjective   History of Present Illness    Review of Systems    Past Medical History:   Diagnosis Date    COPD (chronic obstructive pulmonary disease)     Hyperlipidemia     Peripheral vascular disease     Stroke        Allergies   Allergen Reactions    Codeine Swelling    Latex Hives    Pletal [Cilostazol] Rash     Welps       Past Surgical History:   Procedure Laterality Date    CARDIAC CATHETERIZATION Left 4/6/2021    Procedure: Peripheral angiography;  Surgeon: Dae Callahan MD;  Location:  [facility] INVASIVE LOCATION;  Service: Cardiovascular;  Laterality: Left;       Family History   Problem Relation Age of Onset    Heart disease Mother     Hypertension Mother     Diabetes Mother        Social History     Socioeconomic History    Marital status:     Number of children: 3   Tobacco Use    Smoking status: Every Day     Packs/day: 0.25     Years: 50.00     Additional pack years: 0.00     Total pack years: 12.50     Types: Cigarettes    Smokeless tobacco: Never   Substance and Sexual Activity    Alcohol use: No    Drug use: No    Sexual activity: Defer           Objective   Physical Exam    Procedures           ED Course  ED Course as of 12/06/23 1638   Wed Dec 06, 2023   1241 ECG 12 Lead Other; tachycardia  Vent. Rate : 104 BPM     Atrial Rate : 104 BPM     P-R Int : 126 ms          QRS Dur :  78 ms      QT Int : 338 ms       P-R-T Axes :  86  88  83 degrees     QTc Int : 444 ms     Sinus tachycardia  Biatrial enlargement  Pulmonary disease pattern  Abnormal ECG  When compared with ECG of 21-DEC-2022 20:44,  No significant change was found   [ES]   1339 Patient reports feeling much better. Explained she should follow up with PCP and return to the emergency room immediately for worsening symptoms. Discussed symptoms that would warrant immediate return at length..  [CHUY]      ED Course User Index  [ES] Krzysztof Almonte MD  [CHUY] Roel Dacosta, CARMELO                                              Medical Decision Making  Problems Addressed:  Acute UTI: complicated acute illness or injury  COPD exacerbation: complicated acute illness or injury    Amount and/or Complexity of Data Reviewed  Labs: ordered.  Radiology: ordered.  ECG/medicine tests: ordered. Decision-making details documented in ED Course.    Risk  Prescription drug management.        Final diagnoses:   COPD exacerbation   Acute UTI       ED Disposition  ED Disposition       ED Disposition   Discharge    Condition   Stable    Comment   --               Tata Varghese, APRN  [address]  [place] [id number] [phone]    Schedule an appointment as soon as possible for a visit in 1 day  For further evaluation         Medication List        New Prescriptions      amoxicillin-clavulanate 875-125 MG per tablet  Commonly known as: AUGMENTIN  Take 1 tablet by mouth 2 (Two) Times a Day for 7 days.     methylPREDNISolone 4 MG dose pack  Commonly known as: MEDROL  Take as directed on package instructions.     ondansetron ODT 4 MG disintegrating tablet  Commonly known as: ZOFRAN-ODT  Place 1 tablet on the tongue Every 6 (Six) Hours As Needed for Nausea or Vomiting.               Where to Get Your Medications        These medications were sent to [facility] [place], [place] - [address] [facility] [address] - [phone] [facility] [phone]   [address] [address] [address]      Phone: [phone]   amoxicillin-clavulanate 875-125 MG per tablet  methylPREDNISolone 4 MG dose pack  ondansetron ODT 4 MG disintegrating tablet            Anne Zuleta MD  12/06/23 6772

## 2023-12-06 NOTE — ED NOTES
Called patients ride again, They said they were changing a flat tire and they would be on their way.

## 2023-12-07 LAB — BACTERIA SPEC AEROBE CULT: NO GROWTH

## 2023-12-11 LAB
BACTERIA SPEC AEROBE CULT: NORMAL
BACTERIA SPEC AEROBE CULT: NORMAL

## 2024-06-28 ENCOUNTER — HOSPITAL ENCOUNTER (OUTPATIENT)
Dept: MAMMOGRAPHY | Facility: HOSPITAL | Age: 69
Discharge: HOME | End: 2024-06-28
Admitting: NURSE PRACTITIONER
Payer: MEDICARE

## 2024-06-28 DIAGNOSIS — Z12.39 SCREENING BREAST EXAMINATION: ICD-10-CM

## 2024-06-28 PROCEDURE — 77063 BREAST TOMOSYNTHESIS BI: CPT

## 2024-06-28 PROCEDURE — 77067 SCR MAMMO BI INCL CAD: CPT

## 2024-07-24 ENCOUNTER — HOSPITAL ENCOUNTER (EMERGENCY)
Facility: HOSPITAL | Age: 69
Discharge: HOME | End: 2024-07-24
Attending: STUDENT IN AN ORGANIZED HEALTH CARE EDUCATION/TRAINING PROGRAM
Payer: MEDICARE

## 2024-07-24 ENCOUNTER — APPOINTMENT (OUTPATIENT)
Dept: GENERAL RADIOLOGY | Facility: HOSPITAL | Age: 69
End: 2024-07-24
Payer: MEDICARE

## 2024-07-24 VITALS
SYSTOLIC BLOOD PRESSURE: 110 MMHG | HEART RATE: 75 BPM | OXYGEN SATURATION: 98 % | BODY MASS INDEX: 13.59 KG/M2 | DIASTOLIC BLOOD PRESSURE: 56 MMHG | RESPIRATION RATE: 20 BRPM | TEMPERATURE: 97.8 F | HEIGHT: 61 IN | WEIGHT: 72 LBS

## 2024-07-24 DIAGNOSIS — J44.1 COPD EXACERBATION: Primary | ICD-10-CM

## 2024-07-24 LAB
ALBUMIN SERPL-MCNC: 4.2 G/DL (ref 3.5–5.2)
ALBUMIN/GLOB SERPL: 1.3 G/DL
ALP SERPL-CCNC: 76 U/L (ref 39–117)
ALT SERPL W P-5'-P-CCNC: 6 U/L (ref 1–33)
ANION GAP SERPL CALCULATED.3IONS-SCNC: 13.3 MMOL/L (ref 5–15)
AST SERPL-CCNC: 14 U/L (ref 1–32)
BASOPHILS # BLD AUTO: 0.03 10*3/MM3 (ref 0–0.2)
BASOPHILS NFR BLD AUTO: 0.5 % (ref 0–1.5)
BILIRUB SERPL-MCNC: 1 MG/DL (ref 0–1.2)
BUN SERPL-MCNC: 14 MG/DL (ref 8–23)
BUN/CREAT SERPL: 21.5 (ref 7–25)
CALCIUM SPEC-SCNC: 9.4 MG/DL (ref 8.6–10.5)
CHLORIDE SERPL-SCNC: 101 MMOL/L (ref 98–107)
CO2 SERPL-SCNC: 24.7 MMOL/L (ref 22–29)
CREAT SERPL-MCNC: 0.65 MG/DL (ref 0.57–1)
DEPRECATED RDW RBC AUTO: 51.3 FL (ref 37–54)
EGFRCR SERPLBLD CKD-EPI 2021: 94.9 ML/MIN/1.73
EOSINOPHIL # BLD AUTO: 0 10*3/MM3 (ref 0–0.4)
EOSINOPHIL NFR BLD AUTO: 0 % (ref 0.3–6.2)
ERYTHROCYTE [DISTWIDTH] IN BLOOD BY AUTOMATED COUNT: 12.8 % (ref 12.3–15.4)
GLOBULIN UR ELPH-MCNC: 3.3 GM/DL
GLUCOSE SERPL-MCNC: 116 MG/DL (ref 65–99)
HCT VFR BLD AUTO: 40.5 % (ref 34–46.6)
HGB BLD-MCNC: 13.3 G/DL (ref 12–15.9)
HOLD SPECIMEN: NORMAL
HOLD SPECIMEN: NORMAL
IMM GRANULOCYTES # BLD AUTO: 0.06 10*3/MM3 (ref 0–0.05)
IMM GRANULOCYTES NFR BLD AUTO: 1.1 % (ref 0–0.5)
LYMPHOCYTES # BLD AUTO: 0.98 10*3/MM3 (ref 0.7–3.1)
LYMPHOCYTES NFR BLD AUTO: 17.2 % (ref 19.6–45.3)
MCH RBC QN AUTO: 35.3 PG (ref 26.6–33)
MCHC RBC AUTO-ENTMCNC: 32.8 G/DL (ref 31.5–35.7)
MCV RBC AUTO: 107.4 FL (ref 79–97)
MONOCYTES # BLD AUTO: 0.41 10*3/MM3 (ref 0.1–0.9)
MONOCYTES NFR BLD AUTO: 7.2 % (ref 5–12)
NEUTROPHILS NFR BLD AUTO: 4.22 10*3/MM3 (ref 1.7–7)
NEUTROPHILS NFR BLD AUTO: 74 % (ref 42.7–76)
NRBC BLD AUTO-RTO: 0 /100 WBC (ref 0–0.2)
NT-PROBNP SERPL-MCNC: 322.4 PG/ML (ref 0–900)
PLATELET # BLD AUTO: 86 10*3/MM3 (ref 140–450)
PMV BLD AUTO: 10.6 FL (ref 6–12)
POTASSIUM SERPL-SCNC: 3.9 MMOL/L (ref 3.5–5.2)
PROT SERPL-MCNC: 7.5 G/DL (ref 6–8.5)
QT INTERVAL: 374 MS
QTC INTERVAL: 452 MS
RBC # BLD AUTO: 3.77 10*6/MM3 (ref 3.77–5.28)
SODIUM SERPL-SCNC: 139 MMOL/L (ref 136–145)
TROPONIN T SERPL HS-MCNC: <6 NG/L
WBC NRBC COR # BLD AUTO: 5.7 10*3/MM3 (ref 3.4–10.8)
WHOLE BLOOD HOLD COAG: NORMAL
WHOLE BLOOD HOLD SPECIMEN: NORMAL

## 2024-07-24 PROCEDURE — 93005 ELECTROCARDIOGRAM TRACING: CPT | Performed by: STUDENT IN AN ORGANIZED HEALTH CARE EDUCATION/TRAINING PROGRAM

## 2024-07-24 PROCEDURE — 99284 EMERGENCY DEPT VISIT MOD MDM: CPT

## 2024-07-24 PROCEDURE — 96374 THER/PROPH/DIAG INJ IV PUSH: CPT

## 2024-07-24 PROCEDURE — 80053 COMPREHEN METABOLIC PANEL: CPT | Performed by: STUDENT IN AN ORGANIZED HEALTH CARE EDUCATION/TRAINING PROGRAM

## 2024-07-24 PROCEDURE — 36415 COLL VENOUS BLD VENIPUNCTURE: CPT

## 2024-07-24 PROCEDURE — 25010000002 METHYLPREDNISOLONE PER 125 MG: Performed by: STUDENT IN AN ORGANIZED HEALTH CARE EDUCATION/TRAINING PROGRAM

## 2024-07-24 PROCEDURE — 93010 ELECTROCARDIOGRAM REPORT: CPT | Performed by: INTERNAL MEDICINE

## 2024-07-24 PROCEDURE — 94640 AIRWAY INHALATION TREATMENT: CPT

## 2024-07-24 PROCEDURE — 84484 ASSAY OF TROPONIN QUANT: CPT | Performed by: STUDENT IN AN ORGANIZED HEALTH CARE EDUCATION/TRAINING PROGRAM

## 2024-07-24 PROCEDURE — 85025 COMPLETE CBC W/AUTO DIFF WBC: CPT | Performed by: STUDENT IN AN ORGANIZED HEALTH CARE EDUCATION/TRAINING PROGRAM

## 2024-07-24 PROCEDURE — 83880 ASSAY OF NATRIURETIC PEPTIDE: CPT | Performed by: STUDENT IN AN ORGANIZED HEALTH CARE EDUCATION/TRAINING PROGRAM

## 2024-07-24 PROCEDURE — 94799 UNLISTED PULMONARY SVC/PX: CPT

## 2024-07-24 PROCEDURE — 71045 X-RAY EXAM CHEST 1 VIEW: CPT | Performed by: RADIOLOGY

## 2024-07-24 PROCEDURE — 71045 X-RAY EXAM CHEST 1 VIEW: CPT

## 2024-07-24 RX ORDER — METHYLPREDNISOLONE SODIUM SUCCINATE 125 MG/2ML
125 INJECTION, POWDER, LYOPHILIZED, FOR SOLUTION INTRAMUSCULAR; INTRAVENOUS ONCE
Status: COMPLETED | OUTPATIENT
Start: 2024-07-24 | End: 2024-07-24

## 2024-07-24 RX ORDER — DOXYCYCLINE 100 MG/1
100 CAPSULE ORAL 2 TIMES DAILY
Qty: 13 CAPSULE | Refills: 0 | Status: SHIPPED | OUTPATIENT
Start: 2024-07-24

## 2024-07-24 RX ORDER — PREDNISONE 50 MG/1
50 TABLET ORAL DAILY
Qty: 5 TABLET | Refills: 0 | Status: SHIPPED | OUTPATIENT
Start: 2024-07-24

## 2024-07-24 RX ORDER — SODIUM CHLORIDE 0.9 % (FLUSH) 0.9 %
10 SYRINGE (ML) INJECTION AS NEEDED
Status: DISCONTINUED | OUTPATIENT
Start: 2024-07-24 | End: 2024-07-24 | Stop reason: HOSPADM

## 2024-07-24 RX ORDER — IPRATROPIUM BROMIDE AND ALBUTEROL SULFATE 2.5; .5 MG/3ML; MG/3ML
3 SOLUTION RESPIRATORY (INHALATION) ONCE
Status: COMPLETED | OUTPATIENT
Start: 2024-07-24 | End: 2024-07-24

## 2024-07-24 RX ORDER — DOXYCYCLINE 100 MG/1
100 CAPSULE ORAL ONCE
Status: COMPLETED | OUTPATIENT
Start: 2024-07-24 | End: 2024-07-24

## 2024-07-24 RX ADMIN — DOXYCYCLINE 100 MG: 100 CAPSULE ORAL at 10:07

## 2024-07-24 RX ADMIN — IPRATROPIUM BROMIDE AND ALBUTEROL SULFATE 3 ML: .5; 2.5 SOLUTION RESPIRATORY (INHALATION) at 07:51

## 2024-07-24 RX ADMIN — METHYLPREDNISOLONE SODIUM SUCCINATE 125 MG: 125 INJECTION, POWDER, FOR SOLUTION INTRAMUSCULAR; INTRAVENOUS at 07:56

## 2024-07-24 NOTE — ED PROVIDER NOTES
Subjective   History of Present Illness  69-year-old female with a past medical history of COPD and hyperlipidemia presents to the ER with a primary complaint of increasing shortness of breath.  Confirmed cough with sputum production.  No fever or chills.  No chest pain.  No nausea or vomiting.  Vital stable.  Afebrile      Review of Systems   Respiratory:  Positive for cough and shortness of breath.    All other systems reviewed and are negative.      Past Medical History:   Diagnosis Date    COPD (chronic obstructive pulmonary disease)     Hyperlipidemia     Peripheral vascular disease     Stroke        Allergies   Allergen Reactions    Codeine Swelling    Latex Hives    Pletal [Cilostazol] Rash     Welps       Past Surgical History:   Procedure Laterality Date    CARDIAC CATHETERIZATION Left 4/6/2021    Procedure: Peripheral angiography;  Surgeon: Dae Callahan MD;  Location: [facility] INVASIVE LOCATION;  Service: Cardiovascular;  Laterality: Left;       Family History   Problem Relation Age of Onset    Heart disease Mother     Hypertension Mother     Diabetes Mother     Breast cancer Neg Hx        Social History     Socioeconomic History    Marital status:     Number of children: 3   Tobacco Use    Smoking status: Every Day     Current packs/day: 0.25     Average packs/day: 0.3 packs/day for 50.0 years (12.5 ttl pk-yrs)     Types: Cigarettes    Smokeless tobacco: Never   Substance and Sexual Activity    Alcohol use: No    Drug use: No    Sexual activity: Defer           Objective   Physical Exam  Constitutional:       General: She is not in acute distress.     Appearance: Normal appearance. She is not ill-appearing.   HENT:      Head: Normocephalic and atraumatic.      Right Ear: External ear normal.      Left Ear: External ear normal.      Nose: Nose normal.      Mouth/Throat:      Mouth: Mucous membranes are moist.   Eyes:      Extraocular Movements: Extraocular movements intact.       Pupils: Pupils are equal, round, and reactive to light.   Cardiovascular:      Rate and Rhythm: Normal rate and regular rhythm.      Heart sounds: No murmur heard.  Pulmonary:      Effort: Pulmonary effort is normal. No respiratory distress.      Breath sounds: Examination of the right-upper field reveals decreased breath sounds. Examination of the left-upper field reveals decreased breath sounds. Examination of the right-middle field reveals decreased breath sounds. Examination of the left-middle field reveals decreased breath sounds. Examination of the right-lower field reveals decreased breath sounds. Examination of the left-lower field reveals decreased breath sounds. Decreased breath sounds present. No wheezing.   Abdominal:      General: Bowel sounds are normal.      Palpations: Abdomen is soft.      Tenderness: There is no abdominal tenderness.   Musculoskeletal:         General: No deformity or signs of injury. Normal range of motion.      Cervical back: Normal range of motion and neck supple.   Skin:     General: Skin is warm and dry.      Findings: No erythema.   Neurological:      General: No focal deficit present.      Mental Status: She is alert and oriented to person, place, and time. Mental status is at baseline.      Cranial Nerves: No cranial nerve deficit.   Psychiatric:         Mood and Affect: Mood normal.         Behavior: Behavior normal.         Thought Content: Thought content normal.         Procedures           ED Course  ED Course as of 07/24/24 0907   Wed Jul 24, 2024   0722 EKG notes sinus rhythm.  88 bpm.  I directly evaluated the EKG of this patient and noted no acute abnormalities.  Electronically signed by Jamie Mckeon DO, 07/24/24, 9:06 AM EDT.   [SF]      ED Course User Index  [SF] Jamie Mckeon DO                                 XR Chest 1 View    Result Date: 7/24/2024    Coarsened interstitial markings noted throughout the lungs.   This report was finalized on 7/24/2024  8:00 AM by Dr. Dougie Horton MD.         Results for orders placed or performed during the hospital encounter of 07/24/24   Comprehensive Metabolic Panel    Specimen: Blood   Result Value Ref Range    Glucose 116 (H) 65 - 99 mg/dL    BUN 14 8 - 23 mg/dL    Creatinine 0.65 0.57 - 1.00 mg/dL    Sodium 139 136 - 145 mmol/L    Potassium 3.9 3.5 - 5.2 mmol/L    Chloride 101 98 - 107 mmol/L    CO2 24.7 22.0 - 29.0 mmol/L    Calcium 9.4 8.6 - 10.5 mg/dL    Total Protein 7.5 6.0 - 8.5 g/dL    Albumin 4.2 3.5 - 5.2 g/dL    ALT (SGPT) 6 1 - 33 U/L    AST (SGOT) 14 1 - 32 U/L    Alkaline Phosphatase 76 39 - 117 U/L    Total Bilirubin 1.0 0.0 - 1.2 mg/dL    Globulin 3.3 gm/dL    A/G Ratio 1.3 g/dL    BUN/Creatinine Ratio 21.5 7.0 - 25.0    Anion Gap 13.3 5.0 - 15.0 mmol/L    eGFR 94.9 >60.0 mL/min/1.73   High Sensitivity Troponin T    Specimen: Blood   Result Value Ref Range    HS Troponin T <6 <14 ng/L   CBC Auto Differential    Specimen: Blood   Result Value Ref Range    WBC 5.70 3.40 - 10.80 10*3/mm3    RBC 3.77 3.77 - 5.28 10*6/mm3    Hemoglobin 13.3 12.0 - 15.9 g/dL    Hematocrit 40.5 34.0 - 46.6 %    .4 (H) 79.0 - 97.0 fL    MCH 35.3 (H) 26.6 - 33.0 pg    MCHC 32.8 31.5 - 35.7 g/dL    RDW 12.8 12.3 - 15.4 %    RDW-SD 51.3 37.0 - 54.0 fl    MPV 10.6 6.0 - 12.0 fL    Platelets 86 (L) 140 - 450 10*3/mm3    Neutrophil % 74.0 42.7 - 76.0 %    Lymphocyte % 17.2 (L) 19.6 - 45.3 %    Monocyte % 7.2 5.0 - 12.0 %    Eosinophil % 0.0 (L) 0.3 - 6.2 %    Basophil % 0.5 0.0 - 1.5 %    Immature Grans % 1.1 (H) 0.0 - 0.5 %    Neutrophils, Absolute 4.22 1.70 - 7.00 10*3/mm3    Lymphocytes, Absolute 0.98 0.70 - 3.10 10*3/mm3    Monocytes, Absolute 0.41 0.10 - 0.90 10*3/mm3    Eosinophils, Absolute 0.00 0.00 - 0.40 10*3/mm3    Basophils, Absolute 0.03 0.00 - 0.20 10*3/mm3    Immature Grans, Absolute 0.06 (H) 0.00 - 0.05 10*3/mm3    nRBC 0.0 0.0 - 0.2 /100 WBC   BNP    Specimen: Blood   Result Value Ref Range    proBNP 322.4 0.0 -  900.0 pg/mL   ECG 12 Lead Chest Pain   Result Value Ref Range    QT Interval 374 ms    QTC Interval 452 ms   Green Top (Gel)   Result Value Ref Range    Extra Tube Hold for add-ons.    Lavender Top   Result Value Ref Range    Extra Tube hold for add-on    Gold Top - SST   Result Value Ref Range    Extra Tube Hold for add-ons.    Light Blue Top   Result Value Ref Range    Extra Tube Hold for add-ons.                  Medical Decision Making  CBC and CMP unremarkable.  Troponin within normal limits.  BMP within normal is.  EKG listed.  Chest x-ray noted no acute abnormality.  Solu-Medrol and DuoNeb therapy given.  Will be discharged with oral prednisone and doxycycline for COPD exacerbation.  Work up and results were discussed throughly with the patient.  The patient will be discharged for further monitoring and management with their PCP.  Red flags, warning signs, worsening symptoms, and when to return to the ER discussed with and understood by the patient.  Patient will follow up with their PCP in a timely manner.  Vitals stable at discharge.    Amount and/or Complexity of Data Reviewed  Labs: ordered. Decision-making details documented in ED Course.  Radiology: ordered. Decision-making details documented in ED Course.  ECG/medicine tests: ordered.    Risk  Prescription drug management.        Final diagnoses:   COPD exacerbation       ED Disposition  ED Disposition       ED Disposition   Discharge    Condition   Stable    Comment   --               No follow-up provider specified.       Medication List        Changed      doxycycline 100 MG capsule  Commonly known as: MONODOX  Take 1 capsule by mouth 2 (Two) Times a Day.  What changed:   how much to take  how to take this  when to take this  additional instructions     predniSONE 50 MG tablet  Commonly known as: DELTASONE  Take 1 tablet by mouth Daily.  What changed:   medication strength  how much to take               Where to Get Your Medications        These  medications were sent to [facility] - [place], [place] - [address] [facility] [address] - [phone]  - [phone]  [address] [address] [place] [address]      Phone: [phone]   doxycycline 100 MG capsule  predniSONE 50 MG tablet            Jamie Mckeon DO  07/24/24 0947

## 2024-08-06 ENCOUNTER — APPOINTMENT (OUTPATIENT)
Dept: GENERAL RADIOLOGY | Facility: HOSPITAL | Age: 69
DRG: 193 | End: 2024-08-06
Payer: MEDICARE

## 2024-08-06 ENCOUNTER — APPOINTMENT (OUTPATIENT)
Dept: CT IMAGING | Facility: HOSPITAL | Age: 69
DRG: 193 | End: 2024-08-06
Payer: MEDICARE

## 2024-08-06 ENCOUNTER — HOSPITAL ENCOUNTER (EMERGENCY)
Facility: HOSPITAL | Age: 69
Discharge: HOME | DRG: 193 | End: 2024-08-07
Attending: EMERGENCY MEDICINE | Admitting: EMERGENCY MEDICINE
Payer: MEDICARE

## 2024-08-06 DIAGNOSIS — J44.1 COPD EXACERBATION: Primary | ICD-10-CM

## 2024-08-06 LAB
A-A DO2: 62.5 MMHG (ref 0–300)
ALBUMIN SERPL-MCNC: 3.5 G/DL (ref 3.5–5.2)
ALBUMIN/GLOB SERPL: 0.9 G/DL
ALP SERPL-CCNC: 79 U/L (ref 39–117)
ALT SERPL W P-5'-P-CCNC: 6 U/L (ref 1–33)
ANION GAP SERPL CALCULATED.3IONS-SCNC: 10.4 MMOL/L (ref 5–15)
ARTERIAL PATENCY WRIST A: ABNORMAL
AST SERPL-CCNC: 12 U/L (ref 1–32)
ATMOSPHERIC PRESS: 724 MMHG
BASE EXCESS BLDA CALC-SCNC: 4.9 MMOL/L (ref 0–2)
BASOPHILS # BLD AUTO: 0.01 10*3/MM3 (ref 0–0.2)
BASOPHILS NFR BLD AUTO: 0.2 % (ref 0–1.5)
BDY SITE: ABNORMAL
BILIRUB SERPL-MCNC: 0.5 MG/DL (ref 0–1.2)
BUN SERPL-MCNC: 14 MG/DL (ref 8–23)
BUN/CREAT SERPL: 29.2 (ref 7–25)
CALCIUM SPEC-SCNC: 9 MG/DL (ref 8.6–10.5)
CHLORIDE SERPL-SCNC: 102 MMOL/L (ref 98–107)
CO2 BLDA-SCNC: 32.1 MMOL/L (ref 22–33)
CO2 SERPL-SCNC: 26.6 MMOL/L (ref 22–29)
COHGB MFR BLD: 1.4 % (ref 0–5)
CREAT SERPL-MCNC: 0.48 MG/DL (ref 0.57–1)
CRP SERPL-MCNC: 14.81 MG/DL (ref 0–0.5)
DEPRECATED RDW RBC AUTO: 53.3 FL (ref 37–54)
EGFRCR SERPLBLD CKD-EPI 2021: 102 ML/MIN/1.73
EOSINOPHIL # BLD AUTO: 0.01 10*3/MM3 (ref 0–0.4)
EOSINOPHIL NFR BLD AUTO: 0.2 % (ref 0.3–6.2)
ERYTHROCYTE [DISTWIDTH] IN BLOOD BY AUTOMATED COUNT: 12.9 % (ref 12.3–15.4)
ERYTHROCYTE [SEDIMENTATION RATE] IN BLOOD: 70 MM/HR (ref 0–30)
FLUAV RNA RESP QL NAA+PROBE: NOT DETECTED
FLUBV RNA RESP QL NAA+PROBE: NOT DETECTED
GAS FLOW AIRWAY: 2 LPM
GEN 5 2HR TROPONIN T REFLEX: 6 NG/L
GLOBULIN UR ELPH-MCNC: 3.7 GM/DL
GLUCOSE SERPL-MCNC: 148 MG/DL (ref 65–99)
HCO3 BLDA-SCNC: 30.6 MMOL/L (ref 20–26)
HCT VFR BLD AUTO: 40.1 % (ref 34–46.6)
HCT VFR BLD CALC: 38.6 % (ref 38–51)
HGB BLD-MCNC: 12.6 G/DL (ref 12–15.9)
HGB BLDA-MCNC: 12.6 G/DL (ref 13.5–17.5)
IMM GRANULOCYTES # BLD AUTO: 0.04 10*3/MM3 (ref 0–0.05)
IMM GRANULOCYTES NFR BLD AUTO: 0.6 % (ref 0–0.5)
INHALED O2 CONCENTRATION: 28 %
LYMPHOCYTES # BLD AUTO: 0.57 10*3/MM3 (ref 0.7–3.1)
LYMPHOCYTES NFR BLD AUTO: 8.6 % (ref 19.6–45.3)
Lab: ABNORMAL
MCH RBC QN AUTO: 34.7 PG (ref 26.6–33)
MCHC RBC AUTO-ENTMCNC: 31.4 G/DL (ref 31.5–35.7)
MCV RBC AUTO: 110.5 FL (ref 79–97)
METHGB BLD QL: 0 % (ref 0–3)
MODALITY: ABNORMAL
MONOCYTES # BLD AUTO: 0.39 10*3/MM3 (ref 0.1–0.9)
MONOCYTES NFR BLD AUTO: 5.9 % (ref 5–12)
NEUTROPHILS NFR BLD AUTO: 5.59 10*3/MM3 (ref 1.7–7)
NEUTROPHILS NFR BLD AUTO: 84.5 % (ref 42.7–76)
NRBC BLD AUTO-RTO: 0 /100 WBC (ref 0–0.2)
NT-PROBNP SERPL-MCNC: 182.5 PG/ML (ref 0–900)
OXYHGB MFR BLDV: 94.3 % (ref 94–99)
PCO2 BLDA: 48.7 MM HG (ref 35–45)
PCO2 TEMP ADJ BLD: ABNORMAL MM[HG]
PH BLDA: 7.41 PH UNITS (ref 7.35–7.45)
PH, TEMP CORRECTED: ABNORMAL
PLATELET # BLD AUTO: 133 10*3/MM3 (ref 140–450)
PMV BLD AUTO: 11.5 FL (ref 6–12)
PO2 BLDA: 72.6 MM HG (ref 83–108)
PO2 TEMP ADJ BLD: ABNORMAL MM[HG]
POTASSIUM SERPL-SCNC: 3.8 MMOL/L (ref 3.5–5.2)
PROT SERPL-MCNC: 7.2 G/DL (ref 6–8.5)
RBC # BLD AUTO: 3.63 10*6/MM3 (ref 3.77–5.28)
SAO2 % BLDCOA: 95.6 % (ref 94–99)
SARS-COV-2 RNA RESP QL NAA+PROBE: NOT DETECTED
SODIUM SERPL-SCNC: 139 MMOL/L (ref 136–145)
TROPONIN T DELTA: NORMAL
TROPONIN T SERPL HS-MCNC: <6 NG/L
VENTILATOR MODE: ABNORMAL
WBC NRBC COR # BLD AUTO: 6.61 10*3/MM3 (ref 3.4–10.8)

## 2024-08-06 PROCEDURE — 36415 COLL VENOUS BLD VENIPUNCTURE: CPT

## 2024-08-06 PROCEDURE — 94799 UNLISTED PULMONARY SVC/PX: CPT

## 2024-08-06 PROCEDURE — 83050 HGB METHEMOGLOBIN QUAN: CPT

## 2024-08-06 PROCEDURE — 25010000002 METHYLPREDNISOLONE PER 125 MG: Performed by: EMERGENCY MEDICINE

## 2024-08-06 PROCEDURE — 36600 WITHDRAWAL OF ARTERIAL BLOOD: CPT

## 2024-08-06 PROCEDURE — 87636 SARSCOV2 & INF A&B AMP PRB: CPT | Performed by: EMERGENCY MEDICINE

## 2024-08-06 PROCEDURE — 93005 ELECTROCARDIOGRAM TRACING: CPT | Performed by: EMERGENCY MEDICINE

## 2024-08-06 PROCEDURE — 71045 X-RAY EXAM CHEST 1 VIEW: CPT | Performed by: RADIOLOGY

## 2024-08-06 PROCEDURE — 94640 AIRWAY INHALATION TREATMENT: CPT

## 2024-08-06 PROCEDURE — 99285 EMERGENCY DEPT VISIT HI MDM: CPT

## 2024-08-06 PROCEDURE — 82805 BLOOD GASES W/O2 SATURATION: CPT

## 2024-08-06 PROCEDURE — 25510000001 IOPAMIDOL PER 1 ML: Performed by: EMERGENCY MEDICINE

## 2024-08-06 PROCEDURE — 71275 CT ANGIOGRAPHY CHEST: CPT

## 2024-08-06 PROCEDURE — 80053 COMPREHEN METABOLIC PANEL: CPT | Performed by: EMERGENCY MEDICINE

## 2024-08-06 PROCEDURE — 85652 RBC SED RATE AUTOMATED: CPT | Performed by: EMERGENCY MEDICINE

## 2024-08-06 PROCEDURE — 82375 ASSAY CARBOXYHB QUANT: CPT

## 2024-08-06 PROCEDURE — 71045 X-RAY EXAM CHEST 1 VIEW: CPT

## 2024-08-06 PROCEDURE — 86140 C-REACTIVE PROTEIN: CPT | Performed by: EMERGENCY MEDICINE

## 2024-08-06 PROCEDURE — 83880 ASSAY OF NATRIURETIC PEPTIDE: CPT | Performed by: EMERGENCY MEDICINE

## 2024-08-06 PROCEDURE — 85025 COMPLETE CBC W/AUTO DIFF WBC: CPT | Performed by: EMERGENCY MEDICINE

## 2024-08-06 PROCEDURE — 96374 THER/PROPH/DIAG INJ IV PUSH: CPT

## 2024-08-06 PROCEDURE — 84484 ASSAY OF TROPONIN QUANT: CPT | Performed by: EMERGENCY MEDICINE

## 2024-08-06 RX ORDER — SODIUM CHLORIDE 0.9 % (FLUSH) 0.9 %
10 SYRINGE (ML) INJECTION AS NEEDED
Status: DISCONTINUED | OUTPATIENT
Start: 2024-08-06 | End: 2024-08-07 | Stop reason: HOSPADM

## 2024-08-06 RX ORDER — IPRATROPIUM BROMIDE AND ALBUTEROL SULFATE 2.5; .5 MG/3ML; MG/3ML
3 SOLUTION RESPIRATORY (INHALATION) ONCE
Status: COMPLETED | OUTPATIENT
Start: 2024-08-06 | End: 2024-08-06

## 2024-08-06 RX ORDER — METHYLPREDNISOLONE SODIUM SUCCINATE 125 MG/2ML
125 INJECTION, POWDER, LYOPHILIZED, FOR SOLUTION INTRAMUSCULAR; INTRAVENOUS ONCE
Status: COMPLETED | OUTPATIENT
Start: 2024-08-06 | End: 2024-08-06

## 2024-08-06 RX ORDER — IOPAMIDOL 755 MG/ML
100 INJECTION, SOLUTION INTRAVASCULAR
Status: COMPLETED | OUTPATIENT
Start: 2024-08-06 | End: 2024-08-06

## 2024-08-06 RX ADMIN — IOPAMIDOL 75 ML: 755 INJECTION, SOLUTION INTRAVENOUS at 23:25

## 2024-08-06 RX ADMIN — IPRATROPIUM BROMIDE AND ALBUTEROL SULFATE 3 ML: .5; 2.5 SOLUTION RESPIRATORY (INHALATION) at 19:14

## 2024-08-06 RX ADMIN — METHYLPREDNISOLONE SODIUM SUCCINATE 125 MG: 125 INJECTION, POWDER, FOR SOLUTION INTRAMUSCULAR; INTRAVENOUS at 19:01

## 2024-08-06 RX ADMIN — IPRATROPIUM BROMIDE AND ALBUTEROL SULFATE 3 ML: .5; 2.5 SOLUTION RESPIRATORY (INHALATION) at 21:12

## 2024-08-07 VITALS
BODY MASS INDEX: 12.84 KG/M2 | OXYGEN SATURATION: 96 % | DIASTOLIC BLOOD PRESSURE: 67 MMHG | WEIGHT: 68 LBS | RESPIRATION RATE: 20 BRPM | HEIGHT: 61 IN | SYSTOLIC BLOOD PRESSURE: 119 MMHG | HEART RATE: 89 BPM | TEMPERATURE: 98.9 F

## 2024-08-07 LAB
QT INTERVAL: 316 MS
QTC INTERVAL: 439 MS

## 2024-08-07 PROCEDURE — 94799 UNLISTED PULMONARY SVC/PX: CPT

## 2024-08-07 PROCEDURE — 71275 CT ANGIOGRAPHY CHEST: CPT | Performed by: RADIOLOGY

## 2024-08-07 RX ORDER — IPRATROPIUM BROMIDE AND ALBUTEROL SULFATE 2.5; .5 MG/3ML; MG/3ML
3 SOLUTION RESPIRATORY (INHALATION) ONCE
Status: COMPLETED | OUTPATIENT
Start: 2024-08-07 | End: 2024-08-07

## 2024-08-07 RX ORDER — DOXYCYCLINE 100 MG/1
100 CAPSULE ORAL ONCE
Status: DISCONTINUED | OUTPATIENT
Start: 2024-08-07 | End: 2024-08-07

## 2024-08-07 RX ORDER — PREDNISONE 20 MG/1
20 TABLET ORAL
Qty: 15 TABLET | Refills: 0 | Status: SHIPPED | OUTPATIENT
Start: 2024-08-07 | End: 2024-08-12 | Stop reason: HOSPADM

## 2024-08-07 RX ORDER — DOXYCYCLINE 100 MG/1
100 CAPSULE ORAL 2 TIMES DAILY
Qty: 20 CAPSULE | Refills: 0 | Status: SHIPPED | OUTPATIENT
Start: 2024-08-07 | End: 2024-08-12 | Stop reason: HOSPADM

## 2024-08-07 RX ADMIN — IPRATROPIUM BROMIDE AND ALBUTEROL SULFATE 3 ML: .5; 2.5 SOLUTION RESPIRATORY (INHALATION) at 02:47

## 2024-08-09 ENCOUNTER — APPOINTMENT (OUTPATIENT)
Dept: CT IMAGING | Facility: HOSPITAL | Age: 69
DRG: 193 | End: 2024-08-09
Payer: MEDICARE

## 2024-08-09 ENCOUNTER — HOSPITAL ENCOUNTER (INPATIENT)
Facility: HOSPITAL | Age: 69
LOS: 3 days | Discharge: HOME | DRG: 193 | End: 2024-08-12
Attending: STUDENT IN AN ORGANIZED HEALTH CARE EDUCATION/TRAINING PROGRAM | Admitting: INTERNAL MEDICINE
Payer: MEDICARE

## 2024-08-09 ENCOUNTER — APPOINTMENT (OUTPATIENT)
Dept: GENERAL RADIOLOGY | Facility: HOSPITAL | Age: 69
DRG: 193 | End: 2024-08-09
Payer: MEDICARE

## 2024-08-09 DIAGNOSIS — J44.1 COPD WITH ACUTE EXACERBATION: Primary | ICD-10-CM

## 2024-08-09 DIAGNOSIS — J18.9 PNEUMONIA OF BOTH LUNGS DUE TO INFECTIOUS ORGANISM, UNSPECIFIED PART OF LUNG: ICD-10-CM

## 2024-08-09 PROBLEM — J96.01 ACUTE RESPIRATORY FAILURE WITH HYPOXIA: Status: ACTIVE | Noted: 2024-08-09

## 2024-08-09 LAB
A-A DO2: 68.5 MMHG (ref 0–300)
A-A DO2: 81.7 MMHG (ref 0–300)
ALBUMIN SERPL-MCNC: 3.7 G/DL (ref 3.5–5.2)
ALBUMIN/GLOB SERPL: 0.9 G/DL
ALP SERPL-CCNC: 99 U/L (ref 39–117)
ALT SERPL W P-5'-P-CCNC: 14 U/L (ref 1–33)
ANION GAP SERPL CALCULATED.3IONS-SCNC: 11.6 MMOL/L (ref 5–15)
APTT PPP: 30.7 SECONDS (ref 26.5–34.5)
ARTERIAL PATENCY WRIST A: ABNORMAL
ARTERIAL PATENCY WRIST A: POSITIVE
AST SERPL-CCNC: 18 U/L (ref 1–32)
ATMOSPHERIC PRESS: 724 MMHG
ATMOSPHERIC PRESS: 725 MMHG
B PARAPERT DNA SPEC QL NAA+PROBE: NOT DETECTED
B PERT DNA SPEC QL NAA+PROBE: NOT DETECTED
BACTERIA UR QL AUTO: ABNORMAL /HPF
BASE EXCESS BLDA CALC-SCNC: 6.2 MMOL/L (ref 0–2)
BASE EXCESS BLDA CALC-SCNC: 8.3 MMOL/L (ref 0–2)
BDY SITE: ABNORMAL
BDY SITE: ABNORMAL
BILIRUB SERPL-MCNC: 0.6 MG/DL (ref 0–1.2)
BILIRUB UR QL STRIP: ABNORMAL
BUN SERPL-MCNC: 21 MG/DL (ref 8–23)
BUN/CREAT SERPL: 38.9 (ref 7–25)
C PNEUM DNA NPH QL NAA+NON-PROBE: NOT DETECTED
CALCIUM SPEC-SCNC: 9.4 MG/DL (ref 8.6–10.5)
CHLORIDE SERPL-SCNC: 99 MMOL/L (ref 98–107)
CLARITY UR: ABNORMAL
CO2 BLDA-SCNC: 34.2 MMOL/L (ref 22–33)
CO2 BLDA-SCNC: 36.9 MMOL/L (ref 22–33)
CO2 SERPL-SCNC: 30.4 MMOL/L (ref 22–29)
COHGB MFR BLD: 1.6 % (ref 0–5)
COHGB MFR BLD: 1.7 % (ref 0–5)
COLOR UR: ABNORMAL
CREAT SERPL-MCNC: 0.54 MG/DL (ref 0.57–1)
CRP SERPL-MCNC: 19.18 MG/DL (ref 0–0.5)
D DIMER PPP FEU-MCNC: 1.95 MCGFEU/ML (ref 0–0.7)
D-LACTATE SERPL-SCNC: 1.7 MMOL/L (ref 0.5–2)
DEPRECATED RDW RBC AUTO: 53.2 FL (ref 37–54)
EGFRCR SERPLBLD CKD-EPI 2021: 99.2 ML/MIN/1.73
ERYTHROCYTE [DISTWIDTH] IN BLOOD BY AUTOMATED COUNT: 13.2 % (ref 12.3–15.4)
FLUAV SUBTYP SPEC NAA+PROBE: NOT DETECTED
FLUBV RNA ISLT QL NAA+PROBE: NOT DETECTED
GAS FLOW AIRWAY: 2.5 LPM
GAS FLOW AIRWAY: 30 LPM
GEN 5 2HR TROPONIN T REFLEX: <6 NG/L
GLOBULIN UR ELPH-MCNC: 3.9 GM/DL
GLUCOSE SERPL-MCNC: 127 MG/DL (ref 65–99)
GLUCOSE UR STRIP-MCNC: NEGATIVE MG/DL
HADV DNA SPEC NAA+PROBE: NOT DETECTED
HCO3 BLDA-SCNC: 32.5 MMOL/L (ref 20–26)
HCO3 BLDA-SCNC: 35.1 MMOL/L (ref 20–26)
HCOV 229E RNA SPEC QL NAA+PROBE: NOT DETECTED
HCOV HKU1 RNA SPEC QL NAA+PROBE: NOT DETECTED
HCOV NL63 RNA SPEC QL NAA+PROBE: NOT DETECTED
HCOV OC43 RNA SPEC QL NAA+PROBE: NOT DETECTED
HCT VFR BLD AUTO: 44.8 % (ref 34–46.6)
HCT VFR BLD CALC: 34.4 % (ref 38–51)
HCT VFR BLD CALC: 39 % (ref 38–51)
HGB BLD-MCNC: 14.4 G/DL (ref 12–15.9)
HGB BLDA-MCNC: 11.2 G/DL (ref 13.5–17.5)
HGB BLDA-MCNC: 12.7 G/DL (ref 13.5–17.5)
HGB UR QL STRIP.AUTO: ABNORMAL
HMPV RNA NPH QL NAA+NON-PROBE: NOT DETECTED
HOLD SPECIMEN: NORMAL
HOLD SPECIMEN: NORMAL
HPIV1 RNA ISLT QL NAA+PROBE: NOT DETECTED
HPIV2 RNA SPEC QL NAA+PROBE: NOT DETECTED
HPIV3 RNA NPH QL NAA+PROBE: NOT DETECTED
HPIV4 P GENE NPH QL NAA+PROBE: NOT DETECTED
HYALINE CASTS UR QL AUTO: ABNORMAL /LPF
INHALED O2 CONCENTRATION: 30 %
INHALED O2 CONCENTRATION: 30 %
INR PPP: 0.96 (ref 0.9–1.1)
KETONES UR QL STRIP: ABNORMAL
LEUKOCYTE ESTERASE UR QL STRIP.AUTO: NEGATIVE
LYMPHOCYTES # BLD MANUAL: 0.58 10*3/MM3 (ref 0.7–3.1)
LYMPHOCYTES NFR BLD MANUAL: 5 % (ref 5–12)
Lab: ABNORMAL
Lab: ABNORMAL
M PNEUMO IGG SER IA-ACNC: NOT DETECTED
MACROCYTES BLD QL SMEAR: ABNORMAL
MAGNESIUM SERPL-MCNC: 2.4 MG/DL (ref 1.6–2.4)
MCH RBC QN AUTO: 35.1 PG (ref 26.6–33)
MCHC RBC AUTO-ENTMCNC: 32.1 G/DL (ref 31.5–35.7)
MCV RBC AUTO: 109.3 FL (ref 79–97)
METHGB BLD QL: 0.3 % (ref 0–3)
METHGB BLD QL: 0.3 % (ref 0–3)
MODALITY: ABNORMAL
MODALITY: ABNORMAL
MONOCYTES # BLD: 0.36 10*3/MM3 (ref 0.1–0.9)
NEUTROPHILS # BLD AUTO: 6.28 10*3/MM3 (ref 1.7–7)
NEUTROPHILS NFR BLD MANUAL: 84 % (ref 42.7–76)
NEUTS BAND NFR BLD MANUAL: 3 % (ref 0–5)
NITRITE UR QL STRIP: NEGATIVE
NT-PROBNP SERPL-MCNC: 361.5 PG/ML (ref 0–900)
OXYHGB MFR BLDV: 87.8 % (ref 94–99)
OXYHGB MFR BLDV: 93.2 % (ref 94–99)
PCO2 BLDA: 54.4 MM HG (ref 35–45)
PCO2 BLDA: 57.5 MM HG (ref 35–45)
PCO2 TEMP ADJ BLD: ABNORMAL MM[HG]
PCO2 TEMP ADJ BLD: ABNORMAL MM[HG]
PH BLDA: 7.38 PH UNITS (ref 7.35–7.45)
PH BLDA: 7.39 PH UNITS (ref 7.35–7.45)
PH UR STRIP.AUTO: 5.5 [PH] (ref 5–8)
PH, TEMP CORRECTED: ABNORMAL
PH, TEMP CORRECTED: ABNORMAL
PLATELET # BLD AUTO: 156 10*3/MM3 (ref 140–450)
PMV BLD AUTO: 10.2 FL (ref 6–12)
PO2 BLDA: 57.3 MM HG (ref 83–108)
PO2 BLDA: 74.1 MM HG (ref 83–108)
PO2 TEMP ADJ BLD: ABNORMAL MM[HG]
PO2 TEMP ADJ BLD: ABNORMAL MM[HG]
POTASSIUM SERPL-SCNC: 4.5 MMOL/L (ref 3.5–5.2)
PROT SERPL-MCNC: 7.6 G/DL (ref 6–8.5)
PROT UR QL STRIP: ABNORMAL
PROTHROMBIN TIME: 12.9 SECONDS (ref 12.1–14.7)
QT INTERVAL: 332 MS
QTC INTERVAL: 426 MS
RBC # BLD AUTO: 4.1 10*6/MM3 (ref 3.77–5.28)
RBC # UR STRIP: ABNORMAL /HPF
REF LAB TEST METHOD: ABNORMAL
RHINOVIRUS RNA SPEC NAA+PROBE: NOT DETECTED
RSV RNA NPH QL NAA+NON-PROBE: NOT DETECTED
SAO2 % BLDCOA: 89.6 % (ref 94–99)
SAO2 % BLDCOA: 95 % (ref 94–99)
SARS-COV-2 RNA NPH QL NAA+NON-PROBE: NOT DETECTED
SMALL PLATELETS BLD QL SMEAR: ADEQUATE
SODIUM SERPL-SCNC: 141 MMOL/L (ref 136–145)
SP GR UR STRIP: >=1.03 (ref 1–1.03)
SQUAMOUS #/AREA URNS HPF: ABNORMAL /HPF
TROPONIN T DELTA: NORMAL
TROPONIN T SERPL HS-MCNC: <6 NG/L
UROBILINOGEN UR QL STRIP: ABNORMAL
VARIANT LYMPHS NFR BLD MANUAL: 8 % (ref 19.6–45.3)
VENTILATOR MODE: ABNORMAL
VENTILATOR MODE: ABNORMAL
WBC # UR STRIP: ABNORMAL /HPF
WBC NRBC COR # BLD AUTO: 7.22 10*3/MM3 (ref 3.4–10.8)
WHOLE BLOOD HOLD COAG: NORMAL
WHOLE BLOOD HOLD SPECIMEN: NORMAL

## 2024-08-09 PROCEDURE — 83880 ASSAY OF NATRIURETIC PEPTIDE: CPT | Performed by: PHYSICIAN ASSISTANT

## 2024-08-09 PROCEDURE — 85379 FIBRIN DEGRADATION QUANT: CPT | Performed by: INTERNAL MEDICINE

## 2024-08-09 PROCEDURE — 25010000002 AZITHROMYCIN PER 500 MG: Performed by: PHYSICIAN ASSISTANT

## 2024-08-09 PROCEDURE — 82805 BLOOD GASES W/O2 SATURATION: CPT

## 2024-08-09 PROCEDURE — 82746 ASSAY OF FOLIC ACID SERUM: CPT | Performed by: INTERNAL MEDICINE

## 2024-08-09 PROCEDURE — 25010000002 CEFTRIAXONE PER 250 MG: Performed by: PHYSICIAN ASSISTANT

## 2024-08-09 PROCEDURE — 80053 COMPREHEN METABOLIC PANEL: CPT | Performed by: STUDENT IN AN ORGANIZED HEALTH CARE EDUCATION/TRAINING PROGRAM

## 2024-08-09 PROCEDURE — 84484 ASSAY OF TROPONIN QUANT: CPT | Performed by: PHYSICIAN ASSISTANT

## 2024-08-09 PROCEDURE — 87077 CULTURE AEROBIC IDENTIFY: CPT | Performed by: PHYSICIAN ASSISTANT

## 2024-08-09 PROCEDURE — 94761 N-INVAS EAR/PLS OXIMETRY MLT: CPT

## 2024-08-09 PROCEDURE — 84443 ASSAY THYROID STIM HORMONE: CPT | Performed by: INTERNAL MEDICINE

## 2024-08-09 PROCEDURE — 85025 COMPLETE CBC W/AUTO DIFF WBC: CPT | Performed by: STUDENT IN AN ORGANIZED HEALTH CARE EDUCATION/TRAINING PROGRAM

## 2024-08-09 PROCEDURE — 71045 X-RAY EXAM CHEST 1 VIEW: CPT

## 2024-08-09 PROCEDURE — 71045 X-RAY EXAM CHEST 1 VIEW: CPT | Performed by: RADIOLOGY

## 2024-08-09 PROCEDURE — 36415 COLL VENOUS BLD VENIPUNCTURE: CPT

## 2024-08-09 PROCEDURE — 85730 THROMBOPLASTIN TIME PARTIAL: CPT | Performed by: PHYSICIAN ASSISTANT

## 2024-08-09 PROCEDURE — 99285 EMERGENCY DEPT VISIT HI MDM: CPT

## 2024-08-09 PROCEDURE — 87070 CULTURE OTHR SPECIMN AEROBIC: CPT | Performed by: PHYSICIAN ASSISTANT

## 2024-08-09 PROCEDURE — 93005 ELECTROCARDIOGRAM TRACING: CPT | Performed by: PHYSICIAN ASSISTANT

## 2024-08-09 PROCEDURE — 25510000001 IOPAMIDOL PER 1 ML: Performed by: INTERNAL MEDICINE

## 2024-08-09 PROCEDURE — 94799 UNLISTED PULMONARY SVC/PX: CPT

## 2024-08-09 PROCEDURE — 25810000003 SODIUM CHLORIDE 0.9 % SOLUTION 250 ML FLEX CONT: Performed by: PHYSICIAN ASSISTANT

## 2024-08-09 PROCEDURE — 71275 CT ANGIOGRAPHY CHEST: CPT | Performed by: RADIOLOGY

## 2024-08-09 PROCEDURE — 83605 ASSAY OF LACTIC ACID: CPT | Performed by: STUDENT IN AN ORGANIZED HEALTH CARE EDUCATION/TRAINING PROGRAM

## 2024-08-09 PROCEDURE — 84466 ASSAY OF TRANSFERRIN: CPT | Performed by: INTERNAL MEDICINE

## 2024-08-09 PROCEDURE — 25010000002 HEPARIN (PORCINE) PER 1000 UNITS: Performed by: INTERNAL MEDICINE

## 2024-08-09 PROCEDURE — 82375 ASSAY CARBOXYHB QUANT: CPT

## 2024-08-09 PROCEDURE — 71275 CT ANGIOGRAPHY CHEST: CPT

## 2024-08-09 PROCEDURE — 94640 AIRWAY INHALATION TREATMENT: CPT

## 2024-08-09 PROCEDURE — 86140 C-REACTIVE PROTEIN: CPT | Performed by: PHYSICIAN ASSISTANT

## 2024-08-09 PROCEDURE — 87205 SMEAR GRAM STAIN: CPT | Performed by: PHYSICIAN ASSISTANT

## 2024-08-09 PROCEDURE — 94664 DEMO&/EVAL PT USE INHALER: CPT

## 2024-08-09 PROCEDURE — 85610 PROTHROMBIN TIME: CPT | Performed by: PHYSICIAN ASSISTANT

## 2024-08-09 PROCEDURE — 25810000003 SODIUM CHLORIDE 0.9 % SOLUTION: Performed by: PHYSICIAN ASSISTANT

## 2024-08-09 PROCEDURE — 87040 BLOOD CULTURE FOR BACTERIA: CPT | Performed by: STUDENT IN AN ORGANIZED HEALTH CARE EDUCATION/TRAINING PROGRAM

## 2024-08-09 PROCEDURE — 85007 BL SMEAR W/DIFF WBC COUNT: CPT | Performed by: STUDENT IN AN ORGANIZED HEALTH CARE EDUCATION/TRAINING PROGRAM

## 2024-08-09 PROCEDURE — 0202U NFCT DS 22 TRGT SARS-COV-2: CPT | Performed by: PHYSICIAN ASSISTANT

## 2024-08-09 PROCEDURE — 87185 SC STD ENZYME DETCJ PER NZM: CPT | Performed by: PHYSICIAN ASSISTANT

## 2024-08-09 PROCEDURE — 25010000002 METHYLPREDNISOLONE PER 40 MG: Performed by: INTERNAL MEDICINE

## 2024-08-09 PROCEDURE — 99223 1ST HOSP IP/OBS HIGH 75: CPT | Performed by: INTERNAL MEDICINE

## 2024-08-09 PROCEDURE — 83735 ASSAY OF MAGNESIUM: CPT | Performed by: PHYSICIAN ASSISTANT

## 2024-08-09 PROCEDURE — 82607 VITAMIN B-12: CPT | Performed by: INTERNAL MEDICINE

## 2024-08-09 PROCEDURE — 83050 HGB METHEMOGLOBIN QUAN: CPT

## 2024-08-09 PROCEDURE — 81001 URINALYSIS AUTO W/SCOPE: CPT | Performed by: PHYSICIAN ASSISTANT

## 2024-08-09 PROCEDURE — 36600 WITHDRAWAL OF ARTERIAL BLOOD: CPT

## 2024-08-09 PROCEDURE — 83540 ASSAY OF IRON: CPT | Performed by: INTERNAL MEDICINE

## 2024-08-09 PROCEDURE — 25010000002 METHYLPREDNISOLONE PER 125 MG: Performed by: PHYSICIAN ASSISTANT

## 2024-08-09 RX ORDER — METHYLPREDNISOLONE SODIUM SUCCINATE 40 MG/ML
40 INJECTION, POWDER, LYOPHILIZED, FOR SOLUTION INTRAMUSCULAR; INTRAVENOUS EVERY 12 HOURS
Status: DISCONTINUED | OUTPATIENT
Start: 2024-08-09 | End: 2024-08-12 | Stop reason: HOSPADM

## 2024-08-09 RX ORDER — POLYETHYLENE GLYCOL 3350 17 G/17G
17 POWDER, FOR SOLUTION ORAL DAILY PRN
Status: DISCONTINUED | OUTPATIENT
Start: 2024-08-09 | End: 2024-08-12 | Stop reason: HOSPADM

## 2024-08-09 RX ORDER — SODIUM CHLORIDE 0.9 % (FLUSH) 0.9 %
10 SYRINGE (ML) INJECTION AS NEEDED
Status: DISCONTINUED | OUTPATIENT
Start: 2024-08-09 | End: 2024-08-12 | Stop reason: HOSPADM

## 2024-08-09 RX ORDER — METHYLPREDNISOLONE SODIUM SUCCINATE 125 MG/2ML
125 INJECTION, POWDER, LYOPHILIZED, FOR SOLUTION INTRAMUSCULAR; INTRAVENOUS ONCE
Status: COMPLETED | OUTPATIENT
Start: 2024-08-09 | End: 2024-08-09

## 2024-08-09 RX ORDER — SODIUM CHLORIDE 9 MG/ML
40 INJECTION, SOLUTION INTRAVENOUS AS NEEDED
Status: DISCONTINUED | OUTPATIENT
Start: 2024-08-09 | End: 2024-08-12 | Stop reason: HOSPADM

## 2024-08-09 RX ORDER — IPRATROPIUM BROMIDE AND ALBUTEROL SULFATE 2.5; .5 MG/3ML; MG/3ML
3 SOLUTION RESPIRATORY (INHALATION) ONCE
Status: COMPLETED | OUTPATIENT
Start: 2024-08-09 | End: 2024-08-09

## 2024-08-09 RX ORDER — AMOXICILLIN 250 MG
2 CAPSULE ORAL 2 TIMES DAILY PRN
Status: DISCONTINUED | OUTPATIENT
Start: 2024-08-09 | End: 2024-08-12 | Stop reason: HOSPADM

## 2024-08-09 RX ORDER — BUDESONIDE AND FORMOTEROL FUMARATE DIHYDRATE 160; 4.5 UG/1; UG/1
2 AEROSOL RESPIRATORY (INHALATION)
Status: DISCONTINUED | OUTPATIENT
Start: 2024-08-09 | End: 2024-08-12 | Stop reason: HOSPADM

## 2024-08-09 RX ORDER — BISACODYL 5 MG/1
5 TABLET, DELAYED RELEASE ORAL DAILY PRN
Status: DISCONTINUED | OUTPATIENT
Start: 2024-08-09 | End: 2024-08-12 | Stop reason: HOSPADM

## 2024-08-09 RX ORDER — IPRATROPIUM BROMIDE AND ALBUTEROL SULFATE 2.5; .5 MG/3ML; MG/3ML
3 SOLUTION RESPIRATORY (INHALATION)
Status: DISCONTINUED | OUTPATIENT
Start: 2024-08-09 | End: 2024-08-12 | Stop reason: HOSPADM

## 2024-08-09 RX ORDER — HEPARIN SODIUM 5000 [USP'U]/ML
5000 INJECTION, SOLUTION INTRAVENOUS; SUBCUTANEOUS EVERY 8 HOURS SCHEDULED
Status: DISCONTINUED | OUTPATIENT
Start: 2024-08-09 | End: 2024-08-12 | Stop reason: HOSPADM

## 2024-08-09 RX ORDER — BISACODYL 10 MG
10 SUPPOSITORY, RECTAL RECTAL DAILY PRN
Status: DISCONTINUED | OUTPATIENT
Start: 2024-08-09 | End: 2024-08-12 | Stop reason: HOSPADM

## 2024-08-09 RX ORDER — SODIUM CHLORIDE 0.9 % (FLUSH) 0.9 %
10 SYRINGE (ML) INJECTION EVERY 12 HOURS SCHEDULED
Status: DISCONTINUED | OUTPATIENT
Start: 2024-08-09 | End: 2024-08-12 | Stop reason: HOSPADM

## 2024-08-09 RX ORDER — IOPAMIDOL 755 MG/ML
100 INJECTION, SOLUTION INTRAVASCULAR
Status: COMPLETED | OUTPATIENT
Start: 2024-08-09 | End: 2024-08-09

## 2024-08-09 RX ADMIN — METHYLPREDNISOLONE SODIUM SUCCINATE 125 MG: 125 INJECTION, POWDER, FOR SOLUTION INTRAMUSCULAR; INTRAVENOUS at 09:01

## 2024-08-09 RX ADMIN — Medication 10 ML: at 21:12

## 2024-08-09 RX ADMIN — SODIUM CHLORIDE 2000 MG: 9 INJECTION, SOLUTION INTRAVENOUS at 09:29

## 2024-08-09 RX ADMIN — HEPARIN SODIUM 5000 UNITS: 5000 INJECTION INTRAVENOUS; SUBCUTANEOUS at 21:12

## 2024-08-09 RX ADMIN — BUDESONIDE AND FORMOTEROL FUMARATE DIHYDRATE 2 PUFF: 160; 4.5 AEROSOL RESPIRATORY (INHALATION) at 15:13

## 2024-08-09 RX ADMIN — METHYLPREDNISOLONE SODIUM SUCCINATE 40 MG: 40 INJECTION, POWDER, FOR SOLUTION INTRAMUSCULAR; INTRAVENOUS at 21:12

## 2024-08-09 RX ADMIN — Medication 10 ML: at 13:56

## 2024-08-09 RX ADMIN — IPRATROPIUM BROMIDE AND ALBUTEROL SULFATE 3 ML: .5; 2.5 SOLUTION RESPIRATORY (INHALATION) at 13:28

## 2024-08-09 RX ADMIN — IPRATROPIUM BROMIDE AND ALBUTEROL SULFATE 3 ML: .5; 2.5 SOLUTION RESPIRATORY (INHALATION) at 09:13

## 2024-08-09 RX ADMIN — IOPAMIDOL 40 ML: 755 INJECTION, SOLUTION INTRAVENOUS at 15:44

## 2024-08-09 RX ADMIN — IPRATROPIUM BROMIDE AND ALBUTEROL SULFATE 3 ML: .5; 2.5 SOLUTION RESPIRATORY (INHALATION) at 18:36

## 2024-08-09 RX ADMIN — AZITHROMYCIN DIHYDRATE 500 MG: 500 INJECTION, POWDER, LYOPHILIZED, FOR SOLUTION INTRAVENOUS at 10:45

## 2024-08-09 RX ADMIN — SODIUM CHLORIDE 500 ML: 9 INJECTION, SOLUTION INTRAVENOUS at 09:29

## 2024-08-09 NOTE — H&P
[facility] Medicine Services  History & Physical    Patient Identification:  Name:  Florinda Barr  Age:  69 y.o.  Sex:  female  :  1955  MRN:  [id number]   Visit Number:  [id number]  Admit Date: 2024   Primary Care Physician:  Tata Varghese APRN    Subjective     Chief complaint: Shortness of breath    History of presenting illness:      Florinda Barr is a 69 y.o. female who presented for further evaluation of shortness of breath.  She was seen and examined on 3 S. with no family present at bedside.  She is thin and chronically ill-appearing.  She does wear 2 L nasal cannula continuously at baseline.  She reports due to the recent heat and humidity she has had recurrent respiratory issues.  She did present to the ED 3 to 4 days ago complaining of shortness of breath and productive cough-she was discharged home with doxycycline and steroids though she did not pick these up from the pharmacy.  As symptoms were not improving she did return to care.  She reports subjective fevers over the past few days though has not checked temp but she did report it must have been 200 degrees.  She does report chest pain with coughing or deep breathing.  She is having increased cough from baseline productive of green sputum.  She reports this is occasionally blood-tinged.  She denied abdominal pain or difficulty with urination.  She reports some recent leg swelling though none currently.  She does continue to smoke though has not been able to smoke since last week due to acute illness.  She reports chronic right-sided weakness secondary to previous stroke.    Past medical history is significant for COPD, peripheral vascular disease, hyperlipidemia, stroke    Upon arrival to the ED, vital signs were temp 97.9, heart rate 121, respirations 34, /87, SpO2 91% on 2 L per nasal cannula.  Initial ABG in the ED on 2.5 L per nasal cannula showed pO2 57.3 and pCO2 57.5.  CRP is elevated at  19.18.  D-dimer is elevated at 1.95, there is no leukocytosis.  Urine was dark yellow and cloudy with moderate blood, 11-20 RBCs, no WBCs, 13-20 squamous epis.  Respiratory PCR was negative.  CXR showed no effusion or pneumothorax, there are patchy interstitial opacities in both lungs most consistent with atypical pneumonia and fibrosis.    Known Emergency Department medications received prior to my evaluation included azithromycin, Rocephin, DuoNeb, 125 mg Solu-Medrol, 500 mL normal saline.   Emergency Department Room location at the time of my evaluation was 304b.     ---------------------------------------------------------------------------------------------------------------------   Review of Systems   Constitutional:  Positive for chills and fever (Subjective).   HENT:  Negative for congestion and rhinorrhea.    Respiratory:  Positive for cough and shortness of breath.    Cardiovascular:  Positive for chest pain (Pleuritic).   Gastrointestinal:  Negative for abdominal pain, diarrhea and nausea.   Genitourinary:  Negative for difficulty urinating and dysuria.   Musculoskeletal:  Negative for arthralgias and myalgias.   Skin:  Negative for rash and wound.   Neurological:  Negative for dizziness and light-headedness.        ---------------------------------------------------------------------------------------------------------------------   Past Medical History:   Diagnosis Date    COPD (chronic obstructive pulmonary disease)     Hyperlipidemia     Peripheral vascular disease     Stroke      Past Surgical History:   Procedure Laterality Date    CARDIAC CATHETERIZATION Left 4/6/2021    Procedure: Peripheral angiography;  Surgeon: Dae Callahan MD;  Location: [facility] CATH INVASIVE LOCATION;  Service: Cardiovascular;  Laterality: Left;     Family History   Problem Relation Age of Onset    Heart disease Mother     Hypertension Mother     Diabetes Mother     Breast cancer Neg Hx      Social History      Socioeconomic History    Marital status:     Number of children: 3   Tobacco Use    Smoking status: Every Day     Current packs/day: 0.25     Average packs/day: 0.3 packs/day for 50.0 years (12.5 ttl pk-yrs)     Types: Cigarettes    Smokeless tobacco: Never   Substance and Sexual Activity    Alcohol use: No    Drug use: No    Sexual activity: Defer     ---------------------------------------------------------------------------------------------------------------------   Allergies:  Codeine, Latex, and Pletal [cilostazol]  ---------------------------------------------------------------------------------------------------------------------   Home medications:    Medications below are reported home medications pulling from within the system; at this time, these medications have not been reconciled unless otherwise specified and are in the verification process for further verifcation as current home medications.  Medications Prior to Admission   Medication Sig Dispense Refill Last Dose    albuterol (PROVENTIL) (2.5 MG/3ML) 0.083% nebulizer solution Take 2.5 mg by nebulization Every 6 (Six) Hours As Needed for Wheezing or Shortness of Air.       albuterol sulfate  (90 Base) MCG/ACT inhaler Inhale 2 puffs Every 6 (Six) Hours As Needed for Wheezing.       budesonide-formoterol (SYMBICORT) 160-4.5 MCG/ACT inhaler Inhale 2 puffs by mouth 2 (Two) Times a Day for 30 days. 10.2 g 0     coenzyme Q10 100 MG capsule Take 100 mg by mouth Every Other Day.       dipyridamole-aspirin (AGGRENOX)  MG per 12 hr capsule Take 1 capsule by mouth 2 (two) times a day.       doxycycline (MONODOX) 100 MG capsule Take 1 capsule by mouth 2 (Two) Times a Day. 13 capsule 0     doxycycline (MONODOX) 100 MG capsule Take 1 capsule by mouth 2 (Two) Times a Day for 10 days. 20 capsule 0     folic acid (FOLVITE) 400 MCG tablet Take 400 mcg by mouth Every Other Day.       ipratropium-albuterol (DUO-NEB) 0.5-2.5 mg/3 ml nebulizer  Take 3 mL by neb every 30 minutes as needed for shortness of air for up to 6 doses. Part of COPD Rescue Kit. (Only Start if in YELLOW ZONE.) 18 mL 0     methylPREDNISolone (MEDROL) 4 MG dose pack Take as directed on package instructions. 1 each 0     multivitamin (THERAGRAN) tablet tablet Take 1 tablet by mouth Daily. 30 tablet 0     ondansetron ODT (ZOFRAN-ODT) 4 MG disintegrating tablet Place 1 tablet on the tongue Every 6 (Six) Hours As Needed for Nausea or Vomiting. 12 tablet 0     predniSONE (DELTASONE) 20 MG tablet Take 1 tablet by mouth 3 (Three) Times a Day With Meals. 15 tablet 0     predniSONE (DELTASONE) 50 MG tablet Take 1 tablet by mouth Daily. 5 tablet 0     rosuvastatin (CRESTOR) 40 MG tablet Take 40 mg by mouth Every Other Day.          Hospital Scheduled Meds:  [START ON 8/10/2024] azithromycin, 500 mg, Intravenous, Q24H  budesonide-formoterol, 2 puff, Inhalation, BID - RT  [START ON 8/10/2024] cefTRIAXone, 1,000 mg, Intravenous, Q24H  heparin (porcine), 5,000 Units, Subcutaneous, Q8H  ipratropium-albuterol, 3 mL, Nebulization, 4x Daily - RT  methylPREDNISolone sodium succinate, 40 mg, Intravenous, Q12H  sodium chloride, 10 mL, Intravenous, Q12H           Current listed hospital scheduled medications may not yet reflect those currently placed in orders that are signed and held awaiting patient's arrival to floor.   ---------------------------------------------------------------------------------------------------------------------     Objective     Vital Signs:  Temp:  [97.9 °F (36.6 °C)-98.5 °F (36.9 °C)] 98.5 °F (36.9 °C)  Heart Rate:  [] 93  Resp:  [20-34] 20  BP: (109-146)/(60-87) 141/60      08/09/24  0832 08/09/24  1239   Weight: 30.9 kg (68 lb 3.2 oz) 30.9 kg (68 lb 3.2 oz)     Body mass index is 12.89 kg/m².  ---------------------------------------------------------------------------------------------------------------------       Physical Exam  Vitals and nursing note reviewed.    Constitutional:       General: She is not in acute distress.  HENT:      Head: Normocephalic and atraumatic.   Eyes:      Extraocular Movements: Extraocular movements intact.   Cardiovascular:      Rate and Rhythm: Normal rate and regular rhythm.   Pulmonary:      Effort: Pulmonary effort is normal.   Abdominal:      Palpations: Abdomen is soft.   Musculoskeletal:      Right lower leg: No edema.      Left lower leg: No edema.   Skin:     General: Skin is warm and dry.   Neurological:      Mental Status: She is alert. Mental status is at baseline.   Psychiatric:         Mood and Affect: Mood normal.         Behavior: Behavior normal.             ---------------------------------------------------------------------------------------------------------------------  EKG:        I have personally looked at the EKG.  ---------------------------------------------------------------------------------------------------------------------   Results from last 7 days   Lab Units 08/09/24  0847 08/06/24 1958 08/06/24  1851   CRP mg/dL 19.18*  --  14.81*   LACTATE mmol/L 1.7  --   --    WBC 10*3/mm3 7.22 6.61  --    HEMOGLOBIN g/dL 14.4 12.6  --    HEMATOCRIT % 44.8 40.1  --    MCV fL 109.3* 110.5*  --    MCHC g/dL 32.1 31.4*  --    PLATELETS 10*3/mm3 156 133*  --    INR  0.96  --   --      Results from last 7 days   Lab Units 08/09/24  1031 08/09/24  0846 08/06/24  1932   PH, ARTERIAL pH units 7.385 7.394 7.407   PO2 ART mm Hg 74.1* 57.3* 72.6*   PCO2, ARTERIAL mm Hg 54.4* 57.5* 48.7*   HCO3 ART mmol/L 32.5* 35.1* 30.6*     Results from last 7 days   Lab Units 08/09/24  0847 08/06/24  1851   SODIUM mmol/L 141 139   POTASSIUM mmol/L 4.5 3.8   MAGNESIUM mg/dL 2.4  --    CHLORIDE mmol/L 99 102   CO2 mmol/L 30.4* 26.6   BUN mg/dL 21 14   CREATININE mg/dL 0.54* 0.48*   CALCIUM mg/dL 9.4 9.0   GLUCOSE mg/dL 127* 148*   ALBUMIN g/dL 3.7 3.5   BILIRUBIN mg/dL 0.6 0.5   ALK PHOS U/L 99 79   AST (SGOT) U/L 18 12   ALT (SGPT) U/L 14 6  "  Estimated Creatinine Clearance: 47.3 mL/min (A) (by C-G formula based on SCr of 0.54 mg/dL (L)).  No results found for: \"AMMONIA\"  Results from last 7 days   Lab Units 08/09/24  1045 08/09/24  0847 08/06/24  2126   HSTROP T ng/L <6 <6 6     Results from last 7 days   Lab Units 08/09/24  0847 08/06/24  1851   PROBNP pg/mL 361.5 182.5     Lab Results   Component Value Date    HGBA1C 5.60 12/11/2019     Lab Results   Component Value Date    TSH 4.400 (H) 11/21/2021    FREET4 1.41 12/11/2019     No results found for: \"PREGTESTUR\", \"PREGSERUM\", \"HCG\", \"HCGQUANT\"  Pain Management Panel           No data to display              No results found for: \"BLOODCX\"  No results found for: \"URINECX\"  No results found for: \"WOUNDCX\"  No results found for: \"STOOLCX\"      ---------------------------------------------------------------------------------------------------------------------  Imaging Results (Last 7 Days)       Procedure Component Value Units Date/Time    XR Chest 1 View [[id number]] Collected: 08/09/24 1022     Updated: 08/09/24 1025    Narrative:      EXAM:    XR Chest, 1 View     EXAM DATE:    8/9/2024 8:55 AM     CLINICAL HISTORY:    cough, sob     TECHNIQUE:    Frontal view of the chest.     COMPARISON:    8/6/2024     FINDINGS:    Lungs and pleural spaces:  Patchy and interstitial opacities both  lungs most consistent with superimposed atypical pneumonia and fibrosis.   COPD with hyperinflation.  No effusion or pneumothorax.    Heart:  Unremarkable as visualized.  No cardiomegaly.    Mediastinum:  Unremarkable as visualized.  Normal mediastinal contour.    Bones/joints:  Unremarkable as visualized.  No acute fracture.       Impression:      1.  No effusion or pneumothorax.  2.  Patchy and interstitial opacities both lungs most consistent with  superimposed atypical pneumonia and fibrosis.  3.  COPD with hyperinflation.        This report was finalized on 8/9/2024 10:23 AM by Dr. Loki Kent MD.         " "      Cultures:  No results found for: \"BLOODCX\", \"URINECX\", \"WOUNDCX\", \"MRSACX\", \"RESPCX\", \"STOOLCX\"    Last echocardiogram:  Results for orders placed during the hospital encounter of 02/24/21    Adult Transthoracic Echo Complete W/ Cont if Necessary Per Protocol    Interpretation Summary  · Left ventricular ejection fraction appears to be 56 - 60%. Left ventricular systolic function is normal.  · There is no evidence of pericardial effusion.  · No significant functional valvular abnormalities noted  · No previous study          I have personally reviewed the above radiology images and read the final radiology report on 08/09/24  ---------------------------------------------------------------------------------------------------------------------  Assessment / Plan     Active Hospital Problems    Diagnosis  POA    **Acute respiratory failure with hypoxia [J96.01]  Yes       ASSESSMENT/PLAN:    Acute COPD exacerbation  Bilateral atypical pneumonia  Acute on chronic respiratory failure with hypoxia  Elevated D-dimer, rule out PE  Patient with recent ED visit secondary to COPD exacerbation presents after symptoms had not improved over the next 3 days.  On arrival to the ED she was tachycardic up to 121 with tachypnea up to 34.  SpO2 was 91% on baseline 2 L.  ABG did show pO2 57.3 and pCO2 57.5 on 2.5 L.  BiPAP was trialed though patient could not tolerate and was subsequently placed on heated high flow nasal cannula.  On laboratory evaluation CRP was 19.18, no leukocytosis.  Showed patchy bilateral infiltrates consistent with atypical pneumonia.  D-dimer was elevated at 1.95  She received azithromycin, Rocephin, DuoNeb, 125 mg Solu-Medrol and half liter normal saline bolus in the ED.  Admit to the telemetry unit for further workup and management  Continue azithromycin and Rocephin  Continue Symbicort, DuoNebs, Solu-Medrol at 40 mg twice daily  Continue to titrate supplemental O2 as needed-wean to baseline as " able  Given elevated D-dimer will evaluate further for PE with CT PE protocol  Encourage incentive spirometry, cough/deep breathing  Trend labs  Continue supportive care measures    Chronic:  Peripheral vascular disease  Hyperlipidemia  Hx stroke  Plan to continue home medication regimen as indicated once med rec is complete  ----------  -DVT prophylaxis: Subcu heparin  -Activity: As tolerated  -Expected length of stay: INPATIENT status due to the need for care which can only be reasonably provided in an hospital setting such as aggressive/expedited ancillary services and/or consultation services, the necessity for IV medications, close physician monitoring and/or the possible need for procedures.  In such, I feel patient’s risk for adverse outcomes and need for care warrant INPATIENT evaluation and predict the patient’s care encounter to likely last beyond 2 midnights.   -Disposition pending course and further work up     High risk secondary to COPD ED, bilateral atypical pneumonia, acute on chronic respiratory failure with hypoxia, elevated D-dimer concern for PE    There are no questions and answers to display.       Unruly Nair PA-C   08/09/24  14:04 EDT

## 2024-08-09 NOTE — ED NOTES
Patient placed on bedpan at this time. Call light within reach. Educated on calling out after urinating.

## 2024-08-09 NOTE — ED PROVIDER NOTES
Subjective   History of Present Illness  69-year-old female who presents to the ED today for shortness of breath.  She states she has a history of COPD and wears 2 L of oxygen at all times.  She was seen here in the ED on August 6 and diagnosed with a COPD exacerbation.  She was discharged home on prednisone and doxycycline.  She reports that she has been taking it as prescribed but states that she seems to be feeling worse.  She states her shortness of breath is worsening and she does have some wheezing.  She states she also has a cough that is productive of sputum.  She denies any known fever.  She states she had an episode of disorientation this morning.  She states she was walking through her house and felt like she could not remember what she was supposed to be doing or where she was at.  She is also concerned that she may have a UTI.  She states since last night she has had increased urinary urgency and frequency with dysuria.  She has had some episodes of incontinence.  Patient reports that she smokes about half a pack of cigarettes per day but has not smoked in the last week.  She also states that she has noticed some slight swelling of her feet.    History provided by:  Patient  Shortness of Breath  Severity:  Moderate  Onset quality:  Gradual  Duration:  3 days  Timing:  Constant  Progression:  Worsening  Chronicity:  New  Relieved by:  Nothing  Worsened by:  Nothing  Associated symptoms: cough, sputum production and wheezing    Associated symptoms: no abdominal pain, no chest pain, no diaphoresis, no ear pain, no fever, no headaches, no hemoptysis, no neck pain, no rash, no sore throat, no syncope, no swollen glands and no vomiting    Risk factors: tobacco use        Review of Systems   Constitutional:  Positive for fatigue. Negative for diaphoresis and fever.   HENT: Negative.  Negative for ear pain and sore throat.    Eyes: Negative.    Respiratory:  Positive for cough, sputum production, shortness of  breath and wheezing. Negative for hemoptysis.    Cardiovascular:  Positive for leg swelling. Negative for chest pain, palpitations and syncope.   Gastrointestinal: Negative.  Negative for abdominal pain and vomiting.   Genitourinary: Negative.    Musculoskeletal: Negative.  Negative for neck pain.   Skin: Negative.  Negative for rash.   Neurological:  Negative for headaches.   Psychiatric/Behavioral:  Positive for confusion.    All other systems reviewed and are negative.      Past Medical History:   Diagnosis Date    COPD (chronic obstructive pulmonary disease)     Hyperlipidemia     Peripheral vascular disease     Stroke        Allergies   Allergen Reactions    Codeine Swelling    Latex Hives    Pletal [Cilostazol] Rash     Welps       Past Surgical History:   Procedure Laterality Date    CARDIAC CATHETERIZATION Left 4/6/2021    Procedure: Peripheral angiography;  Surgeon: Dae Callahan MD;  Location: [facility] CATH INVASIVE LOCATION;  Service: Cardiovascular;  Laterality: Left;       Family History   Problem Relation Age of Onset    Heart disease Mother     Hypertension Mother     Diabetes Mother     Breast cancer Neg Hx        Social History     Socioeconomic History    Marital status:     Number of children: 3   Tobacco Use    Smoking status: Every Day     Current packs/day: 0.25     Average packs/day: 0.3 packs/day for 50.0 years (12.5 ttl pk-yrs)     Types: Cigarettes    Smokeless tobacco: Never   Substance and Sexual Activity    Alcohol use: No    Drug use: No    Sexual activity: Defer           Objective   Physical Exam  Vitals and nursing note reviewed.   Constitutional:       General: She is in acute distress.      Appearance: She is underweight. She is ill-appearing.   HENT:      Head: Normocephalic and atraumatic.      Mouth/Throat:      Mouth: Mucous membranes are moist.      Pharynx: Oropharynx is clear.   Eyes:      Extraocular Movements: Extraocular movements intact.      Pupils:  Pupils are equal, round, and reactive to light.   Neck:      Vascular: No JVD.      Trachea: No tracheal deviation.   Cardiovascular:      Rate and Rhythm: Regular rhythm. Tachycardia present.      Pulses: Normal pulses.      Heart sounds: Normal heart sounds.   Pulmonary:      Effort: Tachypnea and accessory muscle usage present.      Breath sounds: No stridor. Examination of the right-upper field reveals wheezing. Examination of the left-upper field reveals wheezing. Wheezing present.      Comments: Patient can only speak in 1-2 word sentences  Chest:      Chest wall: No tenderness.   Abdominal:      General: Bowel sounds are normal.      Palpations: Abdomen is soft.      Tenderness: There is no abdominal tenderness.   Musculoskeletal:         General: Normal range of motion.      Cervical back: Normal range of motion and neck supple.      Comments: Very mild swelling to bilateral ankles noted   Lymphadenopathy:      Cervical: No cervical adenopathy.   Skin:     General: Skin is warm and dry.      Capillary Refill: Capillary refill takes less than 2 seconds.   Neurological:      General: No focal deficit present.      Mental Status: She is alert and oriented to person, place, and time.   Psychiatric:         Mood and Affect: Mood normal.         Procedures       Results for orders placed or performed during the hospital encounter of 08/09/24   Respiratory Panel PCR w/COVID-19(SARS-CoV-2) JENNIFER/KAMALJIT/CESAR/PAD/COR/MESERET In-House, NP Swab in UTM/VTM, 2 HR TAT - Swab, Nasopharynx    Specimen: Nasopharynx; Swab   Result Value Ref Range    ADENOVIRUS, PCR Not Detected Not Detected    Coronavirus 229E Not Detected Not Detected    Coronavirus HKU1 Not Detected Not Detected    Coronavirus NL63 Not Detected Not Detected    Coronavirus OC43 Not Detected Not Detected    COVID19 Not Detected Not Detected - Ref. Range    Human Metapneumovirus Not Detected Not Detected    Human Rhinovirus/Enterovirus Not Detected Not Detected     Influenza A PCR Not Detected Not Detected    Influenza B PCR Not Detected Not Detected    Parainfluenza Virus 1 Not Detected Not Detected    Parainfluenza Virus 2 Not Detected Not Detected    Parainfluenza Virus 3 Not Detected Not Detected    Parainfluenza Virus 4 Not Detected Not Detected    RSV, PCR Not Detected Not Detected    Bordetella pertussis pcr Not Detected Not Detected    Bordetella parapertussis PCR Not Detected Not Detected    Chlamydophila pneumoniae PCR Not Detected Not Detected    Mycoplasma pneumo by PCR Not Detected Not Detected   Respiratory Culture - Sputum, Bronchus    Specimen: Bronchus; Sputum   Result Value Ref Range    Gram Stain Many (4+) WBCs seen     Gram Stain Rare (1+) Epithelial cells seen     Gram Stain Many (4+) Gram negative bacilli     Gram Stain Rare (1+) Mixed gram positive deann    Urinalysis With Culture If Indicated - Urine, Clean Catch    Specimen: Urine, Clean Catch   Result Value Ref Range    Color, UA Dark Yellow (A) Yellow, Straw    Appearance, UA Cloudy (A) Clear    pH, UA 5.5 5.0 - 8.0    Specific Gravity, UA >=1.030 1.005 - 1.030    Glucose, UA Negative Negative    Ketones, UA Trace (A) Negative    Bilirubin, UA Small (1+) (A) Negative    Blood, UA Moderate (2+) (A) Negative    Protein, UA >=300 mg/dL (3+) (A) Negative    Leuk Esterase, UA Negative Negative    Nitrite, UA Negative Negative    Urobilinogen, UA 1.0 E.U./dL 0.2 - 1.0 E.U./dL   Comprehensive Metabolic Panel    Specimen: Blood   Result Value Ref Range    Glucose 127 (H) 65 - 99 mg/dL    BUN 21 8 - 23 mg/dL    Creatinine 0.54 (L) 0.57 - 1.00 mg/dL    Sodium 141 136 - 145 mmol/L    Potassium 4.5 3.5 - 5.2 mmol/L    Chloride 99 98 - 107 mmol/L    CO2 30.4 (H) 22.0 - 29.0 mmol/L    Calcium 9.4 8.6 - 10.5 mg/dL    Total Protein 7.6 6.0 - 8.5 g/dL    Albumin 3.7 3.5 - 5.2 g/dL    ALT (SGPT) 14 1 - 33 U/L    AST (SGOT) 18 1 - 32 U/L    Alkaline Phosphatase 99 39 - 117 U/L    Total Bilirubin 0.6 0.0 - 1.2 mg/dL     Globulin 3.9 gm/dL    A/G Ratio 0.9 g/dL    BUN/Creatinine Ratio 38.9 (H) 7.0 - 25.0    Anion Gap 11.6 5.0 - 15.0 mmol/L    eGFR 99.2 >60.0 mL/min/1.73   Lactic Acid, Plasma    Specimen: Blood   Result Value Ref Range    Lactate 1.7 0.5 - 2.0 mmol/L   CBC Auto Differential    Specimen: Blood   Result Value Ref Range    WBC 7.22 3.40 - 10.80 10*3/mm3    RBC 4.10 3.77 - 5.28 10*6/mm3    Hemoglobin 14.4 12.0 - 15.9 g/dL    Hematocrit 44.8 34.0 - 46.6 %    .3 (H) 79.0 - 97.0 fL    MCH 35.1 (H) 26.6 - 33.0 pg    MCHC 32.1 31.5 - 35.7 g/dL    RDW 13.2 12.3 - 15.4 %    RDW-SD 53.2 37.0 - 54.0 fl    MPV 10.2 6.0 - 12.0 fL    Platelets 156 140 - 450 10*3/mm3   Blood Gas, Arterial With Co-Ox    Specimen: Arterial Blood   Result Value Ref Range    Site Left Radial     Norman's Test Positive     pH, Arterial 7.394 7.350 - 7.450 pH units    pCO2, Arterial 57.5 (H) 35.0 - 45.0 mm Hg    pO2, Arterial 57.3 (L) 83.0 - 108.0 mm Hg    HCO3, Arterial 35.1 (H) 20.0 - 26.0 mmol/L    Base Excess, Arterial 8.3 (H) 0.0 - 2.0 mmol/L    O2 Saturation, Arterial 89.6 (L) 94.0 - 99.0 %    Hemoglobin, Blood Gas 12.7 (L) 13.5 - 17.5 g/dL    Hematocrit, Blood Gas 39.0 38.0 - 51.0 %    Oxyhemoglobin 87.8 (L) 94 - 99 %    Methemoglobin 0.30 0.00 - 3.00 %    Carboxyhemoglobin 1.7 0 - 5 %    A-a DO2 81.7 0.0 - 300.0 mmHg    CO2 Content 36.9 (H) 22 - 33 mmol/L    Barometric Pressure for Blood Gas 724 mmHg    Modality Nasal Cannula     FIO2 30 %    Flow Rate 2.5 lpm    Ventilator Mode NA     Collected by [id number]     pH, Temp Corrected      pCO2, Temperature Corrected      pO2, Temperature Corrected     Protime-INR    Specimen: Blood   Result Value Ref Range    Protime 12.9 12.1 - 14.7 Seconds    INR 0.96 0.90 - 1.10   aPTT    Specimen: Blood   Result Value Ref Range    PTT 30.7 26.5 - 34.5 seconds   BNP    Specimen: Blood   Result Value Ref Range    proBNP 361.5 0.0 - 900.0 pg/mL   High Sensitivity Troponin T    Specimen: Blood   Result Value Ref  Range    HS Troponin T <6 <14 ng/L   C-reactive Protein    Specimen: Blood   Result Value Ref Range    C-Reactive Protein 19.18 (H) 0.00 - 0.50 mg/dL   Magnesium    Specimen: Blood   Result Value Ref Range    Magnesium 2.4 1.6 - 2.4 mg/dL   Manual Differential    Specimen: Blood   Result Value Ref Range    Neutrophil % 84.0 (H) 42.7 - 76.0 %    Lymphocyte % 8.0 (L) 19.6 - 45.3 %    Monocyte % 5.0 5.0 - 12.0 %    Bands %  3.0 0.0 - 5.0 %    Neutrophils Absolute 6.28 1.70 - 7.00 10*3/mm3    Lymphocytes Absolute 0.58 (L) 0.70 - 3.10 10*3/mm3    Monocytes Absolute 0.36 0.10 - 0.90 10*3/mm3    Macrocytes Slight/1+ None Seen    Platelet Estimate Adequate Normal   Urinalysis, Microscopic Only - Urine, Clean Catch    Specimen: Urine, Clean Catch   Result Value Ref Range    RBC, UA 11-20 (A) None Seen, 0-2 /HPF    WBC, UA 0-2 None Seen, 0-2 /HPF    Bacteria, UA Trace (A) None Seen /HPF    Squamous Epithelial Cells, UA 13-20 (A) None Seen, 0-2 /HPF    Hyaline Casts, UA None Seen None Seen /LPF    Methodology Manual Light Microscopy    High Sensitivity Troponin T 2Hr    Specimen: Blood   Result Value Ref Range    HS Troponin T <6 <14 ng/L    Troponin T Delta     Blood Gas, Arterial With Co-Ox    Specimen: Arterial Blood   Result Value Ref Range    Site Left Brachial     Norman's Test N/A     pH, Arterial 7.385 7.350 - 7.450 pH units    pCO2, Arterial 54.4 (H) 35.0 - 45.0 mm Hg    pO2, Arterial 74.1 (L) 83.0 - 108.0 mm Hg    HCO3, Arterial 32.5 (H) 20.0 - 26.0 mmol/L    Base Excess, Arterial 6.2 (H) 0.0 - 2.0 mmol/L    O2 Saturation, Arterial 95.0 94.0 - 99.0 %    Hemoglobin, Blood Gas 11.2 (L) 13.5 - 17.5 g/dL    Hematocrit, Blood Gas 34.4 (L) 38.0 - 51.0 %    Oxyhemoglobin 93.2 (L) 94 - 99 %    Methemoglobin 0.30 0.00 - 3.00 %    Carboxyhemoglobin 1.6 0 - 5 %    A-a DO2 68.5 0.0 - 300.0 mmHg    CO2 Content 34.2 (H) 22 - 33 mmol/L    Barometric Pressure for Blood Gas 725 mmHg    Modality Heated HFNC     FIO2 30 %    Flow Rate  30.0 lpm    Ventilator Mode NA     Collected by [id number]     pH, Temp Corrected      pCO2, Temperature Corrected      pO2, Temperature Corrected     ECG 12 Lead Tachycardia   Result Value Ref Range    QT Interval 332 ms    QTC Interval 426 ms   Green Top (Gel)   Result Value Ref Range    Extra Tube Hold for add-ons.    Lavender Top   Result Value Ref Range    Extra Tube hold for add-on    Gold Top - SST   Result Value Ref Range    Extra Tube Hold for add-ons.    Light Blue Top   Result Value Ref Range    Extra Tube Hold for add-ons.           ED Course  ED Course as of 08/09/24 1203   Fri Aug 09, 2024   0854 Plan is start on BIPAP for work of breathing [AH]   0856   EKG ED Interpretation by: Em Barfield MD on 08/09/24 at 08:53 AM  EKG: HR 99, unchanged from previous tracings, sinus rhythm, short OK, grossly unchanged from priors, no acute ST segment changes.     Electronically signed by Em Barfield MD, 08/09/24, 8:57 AM EDT.   []   0909 Patient could not tolerate the BIPAP - will try high flow NC [AH]   0932 Patient currently on heated HFNC at 30 LPM and is tolerating well. Her respiratory rate has improved. She is on 28% oxygen and pulse ox is showing 98% at this time - will get repeat ABG in an hour. []   1027 XR Chest 1 View  FINDINGS:    Lungs and pleural spaces:  Patchy and interstitial opacities both  lungs most consistent with superimposed atypical pneumonia and fibrosis.   COPD with hyperinflation.  No effusion or pneumothorax.    Heart:  Unremarkable as visualized.  No cardiomegaly.    Mediastinum:  Unremarkable as visualized.  Normal mediastinal contour.    Bones/joints:  Unremarkable as visualized.  No acute fracture.     IMPRESSION:  1.  No effusion or pneumothorax.  2.  Patchy and interstitial opacities both lungs most consistent with  superimposed atypical pneumonia and fibrosis.  3.  COPD with hyperinflation.   []   1048 Dr. Linder to admit []      ED Course User Index  [] Carol Suarez,  PA  [KH] Em Barfield MD                                             Medical Decision Making  69-year-old female who presents to the ED today for worsening shortness of breath.  She does have a history of COPD.  She is also concerned that she may have a UTI.  The patient presented to the ED tachypneic and using accessory muscles.  She could only speak in 1-2 word sentences.  She was given a DuoNeb breathing treatment and IV Solu-Medrol.  Plan was to start her on BiPAP due to her work of breathing.  Unfortunately the patient could not tolerate the BiPAP therefore she was started on heated high flow nasal cannula.  She has tolerated this very well and her work of breathing has greatly improved.  She does report that she is feeling better.  Chest x-ray is consistent with an atypical pneumonia.  Respiratory panel is negative.  She was started on IV Rocephin and azithromycin.  No evidence of a UTI at this time.  I discussed the patient with Dr. Linder who will admit to [facility].    Problems Addressed:  COPD with acute exacerbation: complicated acute illness or injury  Pneumonia of both lungs due to infectious organism, unspecified part of lung: complicated acute illness or injury    Amount and/or Complexity of Data Reviewed  Labs: ordered.  Radiology: ordered. Decision-making details documented in ED Course.  ECG/medicine tests: ordered.    Risk  Prescription drug management.  Decision regarding hospitalization.        Final diagnoses:   COPD with acute exacerbation   Pneumonia of both lungs due to infectious organism, unspecified part of lung       ED Disposition  ED Disposition       ED Disposition   Decision to Admit    Condition   --    Comment   Level of Care: Med/Surg [1]   Diagnosis: Acute respiratory failure with hypoxia [648212]   Certification: I Certify That Inpatient Hospital Services Are Medically Necessary For Greater Than 2 Midnights                 No follow-up provider specified.       Medication List       No changes were made to your prescriptions during this visit.            Carol Suarez PA  08/09/24 [id number]

## 2024-08-09 NOTE — ED NOTES
Decision made to attempt patient on BiPaP machine for work of breathing. Patient did not tolerate. Provider aware. Alternative modality to be attempted by respiratory therapist.

## 2024-08-09 NOTE — CASE MANAGEMENT/SOCIAL WORK
Discharge Planning Assessment   [place]     Patient Name: Florinda Barr  MRN: [id number]  Today's Date: 8/9/2024    Admit Date: 8/9/2024    Plan: Pt lives at home alone but son Jose stays with her sometimes and plans to return home at discharge. Pcp is Tata Varghese, she uses [facility] and has [employer] and [facility] [place]. Pt does not use home health. Pt uses o2 2 liters, port o2 and nebs from [facility].   Discharge Needs Assessment       Row Name 08/09/24 1212       Living Environment    People in Home alone    Current Living Arrangements home    Primary Care Provided by self    Family Caregiver if Needed child(ivania), adult    Quality of Family Relationships helpful;involved;supportive    Able to Return to Prior Arrangements yes       Resource/Environmental Concerns    Resource/Environmental Concerns none       Transition Planning    Patient/Family Anticipates Transition to home with family    Patient/Family Anticipated Services at Transition none    Transportation Anticipated family or friend will provide       Discharge Needs Assessment    Equipment Currently Used at Home oxygen;nebulizer    Concerns to be Addressed discharge planning    Anticipated Changes Related to Illness none    Equipment Needed After Discharge none                   Discharge Plan       Row Name 08/09/24 1213       Plan    Plan Pt lives at home alone but son Jose stays with her sometimes and plans to return home at discharge. Pcp is Tata Varghese, she uses [facility] and has [employer] and [facility] [place]. Pt does not use home health. Pt uses o2 2 liters, port o2 and nebs from [facility].                  Continued Care and Services - Admitted Since 8/9/2024    No active coordination exists for this encounter.       Expected Discharge Date and Time       Expected Discharge Date Expected Discharge Time    Aug 12, 2024            Demographic Summary       Row Name 08/09/24 1212       General  Information    Admission Type inpatient    Arrived From home    Referral Source emergency department    Reason for Consult discharge planning                  Claudia Chowdhury RN

## 2024-08-09 NOTE — PLAN OF CARE
Goal Outcome Evaluation:  Patient is currently resting in bed. No S&S of distress noted at this time. VSS. No complaints at this time. Plan of care ongoing.

## 2024-08-09 NOTE — ED PROVIDER NOTES
Subjective     History provided by:  Patient   used: No    Shortness of Breath  Severity:  Mild  Onset quality:  Gradual  Duration: Several.  Timing:  Constant  Progression:  Worsening  Chronicity:  Chronic  Context: activity    Context: not animal exposure, not emotional upset, not fumes, not known allergens, not occupational exposure, not pollens, not smoke exposure, not strong odors, not URI and not weather changes    Relieved by:  Nothing  Worsened by:  Nothing  Ineffective treatments:  None tried  Associated symptoms: cough and wheezing    Associated symptoms: no abdominal pain, no chest pain, no claudication, no diaphoresis, no ear pain, no fever, no headaches, no hemoptysis, no neck pain, no PND, no rash, no sore throat, no sputum production, no syncope, no swollen glands and no vomiting    Risk factors: tobacco use    Risk factors: no recent alcohol use, no family hx of DVT, no hx of cancer, no hx of PE/DVT, no obesity, no oral contraceptive use, no prolonged immobilization and no recent surgery        Review of Systems   Constitutional:  Negative for activity change, appetite change, chills, diaphoresis, fatigue and fever.   HENT:  Negative for congestion, ear pain and sore throat.    Eyes:  Negative for redness.   Respiratory:  Positive for cough, shortness of breath and wheezing. Negative for hemoptysis, sputum production and chest tightness.    Cardiovascular:  Negative for chest pain, palpitations, claudication, leg swelling, syncope and PND.   Gastrointestinal:  Negative for abdominal pain, diarrhea, nausea and vomiting.   Genitourinary:  Negative for dysuria and urgency.   Musculoskeletal:  Negative for arthralgias, back pain, myalgias and neck pain.   Skin:  Negative for pallor, rash and wound.   Neurological:  Negative for dizziness, speech difficulty, weakness and headaches.   Psychiatric/Behavioral:  Negative for agitation, behavioral problems, confusion and decreased  concentration.    All other systems reviewed and are negative.      Past Medical History:   Diagnosis Date    COPD (chronic obstructive pulmonary disease)     Hyperlipidemia     Peripheral vascular disease     Stroke        Allergies   Allergen Reactions    Codeine Swelling    Latex Hives    Pletal [Cilostazol] Rash     Welps       Past Surgical History:   Procedure Laterality Date    CARDIAC CATHETERIZATION Left 4/6/2021    Procedure: Peripheral angiography;  Surgeon: Dae Callahan MD;  Location: [facility] CATH INVASIVE LOCATION;  Service: Cardiovascular;  Laterality: Left;       Family History   Problem Relation Age of Onset    Heart disease Mother     Hypertension Mother     Diabetes Mother     Breast cancer Neg Hx        Social History     Socioeconomic History    Marital status:     Number of children: 3   Tobacco Use    Smoking status: Every Day     Current packs/day: 0.25     Average packs/day: 0.3 packs/day for 50.0 years (12.5 ttl pk-yrs)     Types: Cigarettes    Smokeless tobacco: Never   Substance and Sexual Activity    Alcohol use: No    Drug use: No    Sexual activity: Defer           Objective   Physical Exam  Vitals and nursing note reviewed.   Constitutional:       General: She is not in acute distress.     Appearance: Normal appearance. She is well-developed. She is not toxic-appearing or diaphoretic.   HENT:      Head: Normocephalic and atraumatic.      Right Ear: External ear normal.      Left Ear: External ear normal.      Nose: Nose normal.      Mouth/Throat:      Pharynx: No oropharyngeal exudate.      Tonsils: No tonsillar exudate.   Eyes:      General: Lids are normal.      Conjunctiva/sclera: Conjunctivae normal.      Pupils: Pupils are equal, round, and reactive to light.   Neck:      Thyroid: No thyromegaly.   Cardiovascular:      Rate and Rhythm: Normal rate and regular rhythm.      Pulses: Normal pulses.      Heart sounds: Normal heart sounds, S1 normal and S2 normal.    Pulmonary:      Effort: Pulmonary effort is normal. Tachypnea present. No respiratory distress.      Breath sounds: Examination of the right-upper field reveals decreased breath sounds and wheezing. Examination of the left-upper field reveals decreased breath sounds and wheezing. Examination of the right-middle field reveals decreased breath sounds and wheezing. Examination of the left-middle field reveals decreased breath sounds and wheezing. Examination of the right-lower field reveals decreased breath sounds and wheezing. Examination of the left-lower field reveals decreased breath sounds and wheezing. Decreased breath sounds and wheezing present. No rales.   Chest:      Chest wall: No tenderness.   Abdominal:      General: Bowel sounds are normal. There is no distension.      Palpations: Abdomen is soft.      Tenderness: There is no abdominal tenderness. There is no guarding or rebound.   Musculoskeletal:         General: No tenderness or deformity. Normal range of motion.      Cervical back: Full passive range of motion without pain, normal range of motion and neck supple.   Lymphadenopathy:      Cervical: No cervical adenopathy.   Skin:     General: Skin is warm and dry.      Coloration: Skin is not pale.      Findings: No erythema or rash.   Neurological:      Mental Status: She is alert and oriented to person, place, and time.      GCS: GCS eye subscore is 4. GCS verbal subscore is 5. GCS motor subscore is 6.      Cranial Nerves: No cranial nerve deficit.      Sensory: No sensory deficit.   Psychiatric:         Speech: Speech normal.         Behavior: Behavior normal.         Thought Content: Thought content normal.         Judgment: Judgment normal.         Procedures           ED Course  ED Course as of 08/09/24 1102   Tue Aug 06, 2024   2038 XR Chest 1 View  IMPRESSION:  No acute cardiopulmonary process. Moderate emphysema.   [ES]   2101 EKG interpretation: Sinus tachycardia 116 bpm QTc 439 nonspecific  ST changes.    Electronically signed by Maulik Davidson DO, 08/06/24, 9:01 PM EDT.   [GF]   Wed Aug 07, 2024   0133 CT Angiogram Chest Pulmonary Embolism  Impression:  1.  No CTA evidence of pulmonary embolism.  2.  Areas of septal thickening with clusters of micronodules in a  tree-in-bud distribution at the right lower lobe suggestive of  pneumonitis/infectious or inflammatory process  3.  No areas of consolidation or pleural effusions  4.  Bilateral 1 cm upper lobe nodules.  Suggest further evaluation with  PET CT scan or follow-up CT chest in 3 months   [ES]   Fri Aug 09, 2024   1102 ECG 12 Lead Tachycardia  Vent. Rate : 116 BPM     Atrial Rate : 116 BPM     P-R Int : 154 ms          QRS Dur :  72 ms      QT Int : 316 ms       P-R-T Axes :   *  86  83 degrees     QTc Int : 439 ms     Sinus tachycardia  Nonspecific ST abnormality  Abnormal ECG   [ES]      ED Course User Index  [ES] Krzysztof Almonte MD  [GF] Maulik Davidson DO                                             Medical Decision Making  Problems Addressed:  COPD exacerbation: complicated acute illness or injury    Amount and/or Complexity of Data Reviewed  Labs: ordered.  Radiology: ordered. Decision-making details documented in ED Course.    Risk  Prescription drug management.        Final diagnoses:   COPD exacerbation       ED Disposition  ED Disposition       ED Disposition   Discharge    Condition   Stable    Comment   --               Tata Varghese, APRN  [address] [address]  [phone]    Schedule an appointment as soon as possible for a visit in 1 day  EVALUATE         Medication List        Changed      * doxycycline 100 MG capsule  Commonly known as: MONODOX  Take 1 capsule by mouth 2 (Two) Times a Day.  What changed: Another medication with the same name was added. Make sure you understand how and when to take each.     * doxycycline 100 MG capsule  Commonly known as: MONODOX  Take 1 capsule by  mouth 2 (Two) Times a Day for 10 days.  What changed: You were already taking a medication with the same name, and this prescription was added. Make sure you understand how and when to take each.     * predniSONE 50 MG tablet  Commonly known as: DELTASONE  Take 1 tablet by mouth Daily.  What changed: Another medication with the same name was added. Make sure you understand how and when to take each.     * predniSONE 20 MG tablet  Commonly known as: DELTASONE  Take 1 tablet by mouth 3 (Three) Times a Day With Meals.  What changed: You were already taking a medication with the same name, and this prescription was added. Make sure you understand how and when to take each.           * This list has 4 medication(s) that are the same as other medications prescribed for you. Read the directions carefully, and ask your doctor or other care provider to review them with you.                   Where to Get Your Medications        These medications were sent to [facility] - [place] - [address] [facility] - [phone]  - [phone] FX  [address] [address] [place] [address]      Phone: [phone]   doxycycline 100 MG capsule  predniSONE 20 MG tablet            Krzysztof Almonte MD  08/09/24 3754

## 2024-08-10 ENCOUNTER — APPOINTMENT (OUTPATIENT)
Dept: GENERAL RADIOLOGY | Facility: HOSPITAL | Age: 69
DRG: 193 | End: 2024-08-10
Payer: MEDICARE

## 2024-08-10 ENCOUNTER — APPOINTMENT (OUTPATIENT)
Dept: ULTRASOUND IMAGING | Facility: HOSPITAL | Age: 69
DRG: 193 | End: 2024-08-10
Payer: MEDICARE

## 2024-08-10 LAB
ANION GAP SERPL CALCULATED.3IONS-SCNC: 7.9 MMOL/L (ref 5–15)
BASOPHILS # BLD AUTO: 0 10*3/MM3 (ref 0–0.2)
BASOPHILS NFR BLD AUTO: 0 % (ref 0–1.5)
BUN SERPL-MCNC: 26 MG/DL (ref 8–23)
BUN/CREAT SERPL: 42.6 (ref 7–25)
CALCIUM SPEC-SCNC: 8.7 MG/DL (ref 8.6–10.5)
CHLORIDE SERPL-SCNC: 105 MMOL/L (ref 98–107)
CO2 SERPL-SCNC: 33.1 MMOL/L (ref 22–29)
CREAT SERPL-MCNC: 0.61 MG/DL (ref 0.57–1)
DEPRECATED RDW RBC AUTO: 52.4 FL (ref 37–54)
EGFRCR SERPLBLD CKD-EPI 2021: 96.3 ML/MIN/1.73
EOSINOPHIL # BLD AUTO: 0 10*3/MM3 (ref 0–0.4)
EOSINOPHIL NFR BLD AUTO: 0 % (ref 0.3–6.2)
ERYTHROCYTE [DISTWIDTH] IN BLOOD BY AUTOMATED COUNT: 12.9 % (ref 12.3–15.4)
GLUCOSE SERPL-MCNC: 143 MG/DL (ref 65–99)
HCT VFR BLD AUTO: 32.5 % (ref 34–46.6)
HGB BLD-MCNC: 10.2 G/DL (ref 12–15.9)
IMM GRANULOCYTES # BLD AUTO: 0.03 10*3/MM3 (ref 0–0.05)
IMM GRANULOCYTES NFR BLD AUTO: 0.6 % (ref 0–0.5)
IRON 24H UR-MRATE: 30 MCG/DL (ref 37–145)
IRON SATN MFR SERPL: 15 % (ref 20–50)
LYMPHOCYTES # BLD AUTO: 0.24 10*3/MM3 (ref 0.7–3.1)
LYMPHOCYTES NFR BLD AUTO: 4.7 % (ref 19.6–45.3)
MCH RBC QN AUTO: 34.7 PG (ref 26.6–33)
MCHC RBC AUTO-ENTMCNC: 31.4 G/DL (ref 31.5–35.7)
MCV RBC AUTO: 110.5 FL (ref 79–97)
MONOCYTES # BLD AUTO: 0.23 10*3/MM3 (ref 0.1–0.9)
MONOCYTES NFR BLD AUTO: 4.5 % (ref 5–12)
NEUTROPHILS NFR BLD AUTO: 4.59 10*3/MM3 (ref 1.7–7)
NEUTROPHILS NFR BLD AUTO: 90.2 % (ref 42.7–76)
NRBC BLD AUTO-RTO: 0 /100 WBC (ref 0–0.2)
PLATELET # BLD AUTO: 122 10*3/MM3 (ref 140–450)
PMV BLD AUTO: 11.2 FL (ref 6–12)
POTASSIUM SERPL-SCNC: 4 MMOL/L (ref 3.5–5.2)
RBC # BLD AUTO: 2.94 10*6/MM3 (ref 3.77–5.28)
SODIUM SERPL-SCNC: 146 MMOL/L (ref 136–145)
TIBC SERPL-MCNC: 204 MCG/DL (ref 298–536)
TRANSFERRIN SERPL-MCNC: 137 MG/DL (ref 200–360)
TSH SERPL DL<=0.05 MIU/L-ACNC: 0.74 UIU/ML (ref 0.27–4.2)
WBC NRBC COR # BLD AUTO: 5.09 10*3/MM3 (ref 3.4–10.8)

## 2024-08-10 PROCEDURE — 25010000002 HEPARIN (PORCINE) PER 1000 UNITS: Performed by: INTERNAL MEDICINE

## 2024-08-10 PROCEDURE — 94799 UNLISTED PULMONARY SVC/PX: CPT

## 2024-08-10 PROCEDURE — 25010000002 CEFTRIAXONE PER 250 MG: Performed by: INTERNAL MEDICINE

## 2024-08-10 PROCEDURE — 94761 N-INVAS EAR/PLS OXIMETRY MLT: CPT

## 2024-08-10 PROCEDURE — 25010000002 METHYLPREDNISOLONE PER 40 MG: Performed by: INTERNAL MEDICINE

## 2024-08-10 PROCEDURE — 25010000002 AZITHROMYCIN PER 500 MG: Performed by: INTERNAL MEDICINE

## 2024-08-10 PROCEDURE — 85025 COMPLETE CBC W/AUTO DIFF WBC: CPT | Performed by: INTERNAL MEDICINE

## 2024-08-10 PROCEDURE — 80048 BASIC METABOLIC PNL TOTAL CA: CPT | Performed by: INTERNAL MEDICINE

## 2024-08-10 PROCEDURE — 25810000003 SODIUM CHLORIDE 0.9 % SOLUTION 250 ML FLEX CONT: Performed by: INTERNAL MEDICINE

## 2024-08-10 PROCEDURE — 99232 SBSQ HOSP IP/OBS MODERATE 35: CPT | Performed by: INTERNAL MEDICINE

## 2024-08-10 PROCEDURE — 73610 X-RAY EXAM OF ANKLE: CPT

## 2024-08-10 PROCEDURE — 94664 DEMO&/EVAL PT USE INHALER: CPT

## 2024-08-10 PROCEDURE — 93971 EXTREMITY STUDY: CPT

## 2024-08-10 RX ADMIN — HEPARIN SODIUM 5000 UNITS: 5000 INJECTION INTRAVENOUS; SUBCUTANEOUS at 21:05

## 2024-08-10 RX ADMIN — IPRATROPIUM BROMIDE AND ALBUTEROL SULFATE 3 ML: .5; 2.5 SOLUTION RESPIRATORY (INHALATION) at 13:28

## 2024-08-10 RX ADMIN — Medication 10 ML: at 21:06

## 2024-08-10 RX ADMIN — IPRATROPIUM BROMIDE AND ALBUTEROL SULFATE 3 ML: .5; 2.5 SOLUTION RESPIRATORY (INHALATION) at 06:47

## 2024-08-10 RX ADMIN — Medication 10 ML: at 09:55

## 2024-08-10 RX ADMIN — BUDESONIDE AND FORMOTEROL FUMARATE DIHYDRATE 2 PUFF: 160; 4.5 AEROSOL RESPIRATORY (INHALATION) at 06:47

## 2024-08-10 RX ADMIN — AZITHROMYCIN DIHYDRATE 500 MG: 500 INJECTION, POWDER, LYOPHILIZED, FOR SOLUTION INTRAVENOUS at 11:17

## 2024-08-10 RX ADMIN — CEFTRIAXONE 1000 MG: 1 INJECTION, POWDER, FOR SOLUTION INTRAMUSCULAR; INTRAVENOUS at 09:55

## 2024-08-10 RX ADMIN — IPRATROPIUM BROMIDE AND ALBUTEROL SULFATE 3 ML: .5; 2.5 SOLUTION RESPIRATORY (INHALATION) at 00:43

## 2024-08-10 RX ADMIN — BUDESONIDE AND FORMOTEROL FUMARATE DIHYDRATE 2 PUFF: 160; 4.5 AEROSOL RESPIRATORY (INHALATION) at 18:39

## 2024-08-10 RX ADMIN — IPRATROPIUM BROMIDE AND ALBUTEROL SULFATE 3 ML: .5; 2.5 SOLUTION RESPIRATORY (INHALATION) at 18:39

## 2024-08-10 RX ADMIN — METHYLPREDNISOLONE SODIUM SUCCINATE 40 MG: 40 INJECTION, POWDER, FOR SOLUTION INTRAMUSCULAR; INTRAVENOUS at 09:54

## 2024-08-10 RX ADMIN — METHYLPREDNISOLONE SODIUM SUCCINATE 40 MG: 40 INJECTION, POWDER, FOR SOLUTION INTRAMUSCULAR; INTRAVENOUS at 21:06

## 2024-08-10 NOTE — PLAN OF CARE
Problem: Adult Inpatient Plan of Care  Goal: Absence of Hospital-Acquired Illness or Injury  Intervention: Prevent Skin Injury  Recent Flowsheet Documentation  Taken 8/9/2024 1918 by Alberto Elaine RN  Body Position: position changed independently     Problem: Adult Inpatient Plan of Care  Goal: Absence of Hospital-Acquired Illness or Injury  Intervention: Prevent and Manage VTE (Venous Thromboembolism) Risk  Recent Flowsheet Documentation  Taken 8/9/2024 1918 by Alberto Elaine, RN  Activity Management: up ad elizabeth  VTE Prevention/Management: (See MAR) other (see comments)   Goal Outcome Evaluation:

## 2024-08-10 NOTE — PROGRESS NOTES
[facility] HOSPITALIST PROGRESS NOTE     Patient Identification:  Name:  Florinda Barr  Age:  69 y.o.  Sex:  female  :  1955  MRN:  [id number]  Visit Number:  [id number]  ROOM: [address] [place] [address]     Primary Care Provider:  Tata Varghese APRN    Length of stay in inpatient status:  1    Subjective     Chief Compliant:    Chief Complaint   Patient presents with    Urinary Tract Infection    Shortness of Breath       History of Presenting Illness:    Breathing continues to be better. Reports right ankle and foot pain that has been occurring at home but worse and making it difficult for her walk. NO known recent trauma. Still getting SOB on exertion having to use bedside commode.     ROS:  Otherwise 10 point ROS negative other than documented above in HPI.     Objective     Current Hospital Meds:azithromycin, 500 mg, Intravenous, Q24H  budesonide-formoterol, 2 puff, Inhalation, BID - RT  cefTRIAXone, 1,000 mg, Intravenous, Q24H  heparin (porcine), 5,000 Units, Subcutaneous, Q8H  ipratropium-albuterol, 3 mL, Nebulization, 4x Daily - RT  methylPREDNISolone sodium succinate, 40 mg, Intravenous, Q12H  sodium chloride, 10 mL, Intravenous, Q12H         Current Antimicrobial Therapy:  Anti-Infectives (From admission, onward)      Ordered     Dose/Rate Route Frequency Start Stop    24 1254  azithromycin (ZITHROMAX) 500 mg in sodium chloride 0.9 % 250 mL IVPB-VTB        Ordering Provider: Jay Jay Linder MD    500 mg  over 60 Minutes Intravenous Every 24 Hours 08/10/24 1000 24 0959    24 1254  cefTRIAXone (ROCEPHIN) 1,000 mg in sodium chloride 0.9 % 100 mL IVPB-VTB        Ordering Provider: Jay Jay Linder MD    1,000 mg  200 mL/hr over 30 Minutes Intravenous Every 24 Hours 08/10/24 0900 24 0859    24 1028  azithromycin (ZITHROMAX) 500 mg in sodium chloride 0.9 % 250 mL IVPB-VTB        Ordering Provider: Carol Suarez PA    500 mg  over 60 Minutes Intravenous Once 24 1044  08/09/24 1145    08/09/24 0920  cefTRIAXone (ROCEPHIN) 2,000 mg in sodium chloride 0.9 % 100 mL IVPB-VTB        Ordering Provider: Carol Suarez PA    2,000 mg  200 mL/hr over 30 Minutes Intravenous Once 08/09/24 0936 08/09/24 0959          Current Diuretic Therapy:  Diuretics (From admission, onward)      None          ----------------------------------------------------------------------------------------------------------------------  Vital Signs:  Temp:  [97.8 °F (36.6 °C)-98.5 °F (36.9 °C)] 98.2 °F (36.8 °C)  Heart Rate:  [] 98  Resp:  [18-20] 18  BP: (110-129)/(48-60) 129/52  SpO2:  [95 %-99 %] 99 %  on  Flow (L/min):  [2] 2;   Device (Oxygen Therapy): nasal cannula  Body mass index is 13.38 kg/m².    Wt Readings from Last 3 Encounters:   08/10/24 32.1 kg (70 lb 12.8 oz)   08/06/24 30.8 kg (68 lb)   07/24/24 32.7 kg (72 lb)     Intake & Output (last 3 days)         08/07 0701 08/08 0700 08/08 0701 08/09 0700 08/09 0701  08/10 0700 08/10 0701 08/11 0700    P.O.   360 480    IV Piggyback   850     Total Intake(mL/kg)   1210 (37.7) 480 (15)    Urine (mL/kg/hr)    400 (1)    Total Output    400    Net   +1210 +80            Urine Unmeasured Occurrence   3 x 1 x          Diet: Regular/House; Fluid Consistency: Thin (IDDSI 0)  ----------------------------------------------------------------------------------------------------------------------  Physical exam:  Constitutional:  Thin.   HENT:  Head:  Normocephalic and atraumatic.  Mouth:  Moist mucous membranes.    Eyes:  Conjunctivae and EOM are normal. No scleral icterus.    Neck:  Neck supple.  No JVD present.    Cardiovascular:  Normal rate, regular rhythm and normal heart sounds with no murmur.  Pulmonary/Chest:  No respiratory distress, no wheezes, no crackles, with normal breath sounds and good air movement.  Abdominal:  Soft.  Bowel sounds are normal.  No distension and no tenderness.   Musculoskeletal:  No edema, no tenderness, and no deformity.  No  "red or swollen joints anywhere.    Neurological:  Alert and oriented to person, place, and time.  No cranial nerve deficit.  No tongue deviation.  No facial droop.  No slurred speech.   Skin:  Skin is warm and dry. No rash noted. No pallor.   Peripheral vascular:  Pulses in all 4 extremities with no clubbing, no cyanosis, no edema.  ----------------------------------------------------------------------------------------------------------------------  Tele:    ----------------------------------------------------------------------------------------------------------------------  Results from last 7 days   Lab Units 08/10/24  0027 08/09/24  0847 08/06/24  1958 08/06/24  1851   CRP mg/dL  --  19.18*  --  14.81*   LACTATE mmol/L  --  1.7  --   --    WBC 10*3/mm3 5.09 7.22 6.61  --    HEMOGLOBIN g/dL 10.2* 14.4 12.6  --    HEMATOCRIT % 32.5* 44.8 40.1  --    MCV fL 110.5* 109.3* 110.5*  --    MCHC g/dL 31.4* 32.1 31.4*  --    PLATELETS 10*3/mm3 122* 156 133*  --    INR   --  0.96  --   --      Results from last 7 days   Lab Units 08/09/24  1031   PH, ARTERIAL pH units 7.385   PO2 ART mm Hg 74.1*   PCO2, ARTERIAL mm Hg 54.4*   HCO3 ART mmol/L 32.5*     Results from last 7 days   Lab Units 08/10/24  0026 08/09/24  0847 08/06/24  1851   SODIUM mmol/L 146* 141 139   POTASSIUM mmol/L 4.0 4.5 3.8   MAGNESIUM mg/dL  --  2.4  --    CHLORIDE mmol/L 105 99 102   CO2 mmol/L 33.1* 30.4* 26.6   BUN mg/dL 26* 21 14   CREATININE mg/dL 0.61 0.54* 0.48*   CALCIUM mg/dL 8.7 9.4 9.0   GLUCOSE mg/dL 143* 127* 148*   ALBUMIN g/dL  --  3.7 3.5   BILIRUBIN mg/dL  --  0.6 0.5   ALK PHOS U/L  --  99 79   AST (SGOT) U/L  --  18 12   ALT (SGPT) U/L  --  14 6   Estimated Creatinine Clearance: 43.5 mL/min (by C-G formula based on SCr of 0.61 mg/dL).  No results found for: \"AMMONIA\"  Results from last 7 days   Lab Units 08/09/24  1045 08/09/24  0847 08/06/24  2126   HSTROP T ng/L <6 <6 6     Results from last 7 days   Lab Units 08/09/24  0847 " "08/06/24  1851   PROBNP pg/mL 361.5 182.5         No results found for: \"HGBA1C\", \"POCGLU\"  Lab Results   Component Value Date    TSH 4.400 (H) 11/21/2021    FREET4 1.41 12/11/2019     No results found for: \"PREGTESTUR\", \"PREGSERUM\", \"HCG\", \"HCGQUANT\"  Pain Management Panel           No data to display              Brief Urine Lab Results  (Last result in the past 365 days)        Color   Clarity   Blood   Leuk Est   Nitrite   Protein   CREAT   Urine HCG        08/09/24 0854 Dark Yellow   Cloudy   Moderate (2+)   Negative   Negative   >=300 mg/dL (3+)                 Blood Culture   Date Value Ref Range Status   08/09/2024 No growth at 24 hours  Preliminary   08/09/2024 No growth at 24 hours  Preliminary     No results found for: \"URINECX\"  No results found for: \"WOUNDCX\"  No results found for: \"STOOLCX\"  No results found for: \"RESPCX\"  No results found for: \"AFBCX\"  Results from last 7 days   Lab Units 08/09/24  0847 08/06/24 1958 08/06/24 1851   LACTATE mmol/L 1.7  --   --    SED RATE mm/hr  --  70*  --    CRP mg/dL 19.18*  --  14.81*       I have personally looked at the labs and they are summarized above.  ----------------------------------------------------------------------------------------------------------------------  Detailed radiology reports for the last 24 hours:    Imaging Results (Last 24 Hours)       Procedure Component Value Units Date/Time    XR Ankle 3+ View Right [[id number]] Collected: 08/10/24 2050     Updated: 08/10/24 9173    Narrative:      3 views of the right ankle was obtained.     History is pain.     Ankle mortise is preserved.  No fracture or dislocation.  Normal appearing subtalar joint.  No soft tissue edema or air in soft tissues.       Impression:      No acute process.     This report was finalized on 8/10/2024 3:51 PM by Dr. Lavell Gifford MD.       US Venous Doppler Lower Extremity Right (duplex) [[id number]] Collected: 08/10/24 1240     Updated: 08/10/24 1243    Narrative:      " PROCEDURE: Venous duplex ultrasound evaluation of the right lower  extremity performed on August 10, 2024. Color flow imaging performed.     HISTORY: Right leg pain.     COMPARISON: None.     FINDINGS:     Normal color flow imaging. Normal venous compression and normal waveform  augmentation.       Impression:         1.  No DVT in the right lower extremity.     This report was finalized on 8/10/2024 12:41 PM by Shen Whitten MD.             Assessment & Plan    #Acute on chronic hypoxic respiratory failure in setting of chronic hypercapnic respiratory failure   #COPD exacerbation  #Bilateral atypical pneumonia   - Failed outpatient therapies   - Will start ceftiraxone, doxy, solumedrol, nebs   - Weaned to home O2. Goal SpO2 is 88-92%  - Follow blood and respiratory cultures      #Elevated D-dimer  - CTPE ruled out PE, again consistent with atypical pneumonia.    #Acute macrocytic anemia  - Hgb dropped from 14 to 10. Possibly partly dilutional. No signs of bleeding. Will get iron profile, TSH, vitamin B12, folate.      #Severe malnutrition  - BMI 12.89. Nutrition consulted. Suspect from chronic disease.     #RLE pain  - Duplex and plain film unrevealing.   - Suspect OA     Chronic medical problems:  PVD  HLD  Hx of CVA        Code status: DNR/DNI     Dispo: Possibly home in AM if continues to improve.     Jay Jay Linder MD  [facility] Hospitalist  08/10/24  18:55 EDT

## 2024-08-10 NOTE — PAYOR COMM NOTE
"[facility]  BRADLEY TERRY  PHONE  [phone]  FAX  [phone]  NPI:  [id number]    REQUEST FOR INPATIENT AUTH    Tevin Cota (69 y.o. Female)       Date of Birth   1955    Social Security Number       Address   [address] [facility] [address]    Home Phone   [phone]    MRN   [id number]       Spiritism   Congregational    Marital Status                               Admission Date   8/9/24    Admission Type   Emergency    Admitting Provider   Jay Jay Linder MD    Attending Provider   Jay Jay Linder MD    Department, Room/Bed   [facility], 3304/2S       Discharge Date       Discharge Disposition       Discharge Destination                                 Attending Provider: Jay Jay Linder MD    Allergies: Codeine, Latex, Pletal [Cilostazol]    Isolation: None   Infection: None   Code Status: No CPR    Ht: 154.9 cm (61\")   Wt: 32.1 kg (70 lb 12.8 oz)    Admission Cmt: None   Principal Problem: Acute respiratory failure with hypoxia [J96.01]                   Active Insurance as of 8/9/2024       Primary Coverage       Payor Plan Insurance Group Employer/Plan Group    ANTHEM MEDICARE REPLACEMENT ANTHEM MEDICARE ADVANTAGE [id number]       Payor Plan Address Payor Plan Phone Number Payor Plan Fax Number Effective Dates    [address] [phone]  1/1/2024 - None Entered    [facility] [id number]         Subscriber Name Subscriber Birth Date Member ID       TEVIN COTA 1955 [id number]               Secondary Coverage       Payor Plan Insurance Group Employer/Plan Group    [employer] [place] WE[place]ARE MEDICAID        Payor Plan Address Payor Plan Phone Number Payor Plan Fax Number Effective Dates    [address] [address] [phone]  2/1/2016 - None Entered    [facility] [id number]         Subscriber Name Subscriber Birth Date Member ID       TEVIN COTA 1955 [id number]                     Emergency Contacts        (Rel.) Home Phone Work Phone Mobile Phone "    Tyra Barr (Daughter) [phone] -- --                 History & Physical        Unruly Nair PA-C at 24 1403       Attestation signed by Jay Jay Linder MD at 24 1617    I have reviewed this documentation and agree.    Ms. Barr is our 70 yo F with hx of COPD, peripheral vascular disease, hyperlipidemia, stroke who presents with shortness of breath the last 2 weeks. She was seen in ED on  and prescribed steroids and abx patient took home. She reports feeling worse after getting home to the point of being able to eat or sleep with the dyspnea. She notes productive cough. She stopped smoking a couple weeks ago. In the ED she was placed on BiPAP due to dyspnea but did not tolerate. She was initially on heated high flow and given steroids, nebs, abx. She has quickly been weaned back down tot 2L NC on my evaluation and tolerating well.     Updated plan of care:    #Acute on chronic hypoxic respiratory failure in setting of chronic hypercapnic respiratory failure   #COPD exacerbation  #Bilateral atypical pneumonia   - Failed outpatient therapies   - Will start ceftiraxone, doxy, solumedrol, nebs   - Weaned to home O2. Goal SpO2 is 88-92%  - Follow blood and respiratory cultures     #Elevated D-dimer  - Will get CTPE to rule out PE    #Severe malnutrition  - BMI 12.89. Nutrition consulted. Suspect from chronic disease.     Chronic medical problems:  PVD  HLD  Hx of CVA      Code status: DNR/DNI    Dispo: Admit to [facility] Medicine Services  History & Physical    Patient Identification:  Name:  Florinda Barr  Age:  69 y.o.  Sex:  female  :  1955  MRN:  [id number]   Visit Number:  [id number]  Admit Date: 2024   Primary Care Physician:  Tata Varghese APRN    Subjective     Chief complaint: Shortness of breath    History of presenting illness:      Florinda Barr is a 69 y.o. female who presented for further evaluation  of shortness of breath.  She was seen and examined on 3 S. with no family present at bedside.  She is thin and chronically ill-appearing.  She does wear 2 L nasal cannula continuously at baseline.  She reports due to the recent heat and humidity she has had recurrent respiratory issues.  She did present to the ED 3 to 4 days ago complaining of shortness of breath and productive cough-she was discharged home with doxycycline and steroids though she did not pick these up from the pharmacy.  As symptoms were not improving she did return to care.  She reports subjective fevers over the past few days though has not checked temp but she did report it must have been 200 degrees.  She does report chest pain with coughing or deep breathing.  She is having increased cough from baseline productive of green sputum.  She reports this is occasionally blood-tinged.  She denied abdominal pain or difficulty with urination.  She reports some recent leg swelling though none currently.  She does continue to smoke though has not been able to smoke since last week due to acute illness.  She reports chronic right-sided weakness secondary to previous stroke.    Past medical history is significant for COPD, peripheral vascular disease, hyperlipidemia, stroke    Upon arrival to the ED, vital signs were temp 97.9, heart rate 121, respirations 34, /87, SpO2 91% on 2 L per nasal cannula.  Initial ABG in the ED on 2.5 L per nasal cannula showed pO2 57.3 and pCO2 57.5.  CRP is elevated at 19.18.  D-dimer is elevated at 1.95, there is no leukocytosis.  Urine was dark yellow and cloudy with moderate blood, 11-20 RBCs, no WBCs, 13-20 squamous epis.  Respiratory PCR was negative.  CXR showed no effusion or pneumothorax, there are patchy interstitial opacities in both lungs most consistent with atypical pneumonia and fibrosis.    Known Emergency Department medications received prior to my evaluation included azithromycin, Rocephin, DuoNeb, 125  mg Solu-Medrol, 500 mL normal saline.   Emergency Department Room location at the time of my evaluation was 304b.     ---------------------------------------------------------------------------------------------------------------------   Review of Systems   Constitutional:  Positive for chills and fever (Subjective).   HENT:  Negative for congestion and rhinorrhea.    Respiratory:  Positive for cough and shortness of breath.    Cardiovascular:  Positive for chest pain (Pleuritic).   Gastrointestinal:  Negative for abdominal pain, diarrhea and nausea.   Genitourinary:  Negative for difficulty urinating and dysuria.   Musculoskeletal:  Negative for arthralgias and myalgias.   Skin:  Negative for rash and wound.   Neurological:  Negative for dizziness and light-headedness.        ---------------------------------------------------------------------------------------------------------------------   Past Medical History:   Diagnosis Date    COPD (chronic obstructive pulmonary disease)     Hyperlipidemia     Peripheral vascular disease     Stroke      Past Surgical History:   Procedure Laterality Date    CARDIAC CATHETERIZATION Left 4/6/2021    Procedure: Peripheral angiography;  Surgeon: Dae Callahan MD;  Location: [facility] INVASIVE LOCATION;  Service: Cardiovascular;  Laterality: Left;     Family History   Problem Relation Age of Onset    Heart disease Mother     Hypertension Mother     Diabetes Mother     Breast cancer Neg Hx      Social History     Socioeconomic History    Marital status:     Number of children: 3   Tobacco Use    Smoking status: Every Day     Current packs/day: 0.25     Average packs/day: 0.3 packs/day for 50.0 years (12.5 ttl pk-yrs)     Types: Cigarettes    Smokeless tobacco: Never   Substance and Sexual Activity    Alcohol use: No    Drug use: No    Sexual activity: Defer      ---------------------------------------------------------------------------------------------------------------------   Allergies:  Codeine, Latex, and Pletal [cilostazol]  ---------------------------------------------------------------------------------------------------------------------   Home medications:    Medications below are reported home medications pulling from within the system; at this time, these medications have not been reconciled unless otherwise specified and are in the verification process for further verifcation as current home medications.  Medications Prior to Admission   Medication Sig Dispense Refill Last Dose    albuterol (PROVENTIL) (2.5 MG/3ML) 0.083% nebulizer solution Take 2.5 mg by nebulization Every 6 (Six) Hours As Needed for Wheezing or Shortness of Air.       albuterol sulfate  (90 Base) MCG/ACT inhaler Inhale 2 puffs Every 6 (Six) Hours As Needed for Wheezing.       budesonide-formoterol (SYMBICORT) 160-4.5 MCG/ACT inhaler Inhale 2 puffs by mouth 2 (Two) Times a Day for 30 days. 10.2 g 0     coenzyme Q10 100 MG capsule Take 100 mg by mouth Every Other Day.       dipyridamole-aspirin (AGGRENOX)  MG per 12 hr capsule Take 1 capsule by mouth 2 (two) times a day.       doxycycline (MONODOX) 100 MG capsule Take 1 capsule by mouth 2 (Two) Times a Day. 13 capsule 0     doxycycline (MONODOX) 100 MG capsule Take 1 capsule by mouth 2 (Two) Times a Day for 10 days. 20 capsule 0     folic acid (FOLVITE) 400 MCG tablet Take 400 mcg by mouth Every Other Day.       ipratropium-albuterol (DUO-NEB) 0.5-2.5 mg/3 ml nebulizer Take 3 mL by neb every 30 minutes as needed for shortness of air for up to 6 doses. Part of COPD Rescue Kit. (Only Start if in YELLOW ZONE.) 18 mL 0     methylPREDNISolone (MEDROL) 4 MG dose pack Take as directed on package instructions. 1 each 0     multivitamin (THERAGRAN) tablet tablet Take 1 tablet by mouth Daily. 30 tablet 0     ondansetron ODT (ZOFRAN-ODT)  4 MG disintegrating tablet Place 1 tablet on the tongue Every 6 (Six) Hours As Needed for Nausea or Vomiting. 12 tablet 0     predniSONE (DELTASONE) 20 MG tablet Take 1 tablet by mouth 3 (Three) Times a Day With Meals. 15 tablet 0     predniSONE (DELTASONE) 50 MG tablet Take 1 tablet by mouth Daily. 5 tablet 0     rosuvastatin (CRESTOR) 40 MG tablet Take 40 mg by mouth Every Other Day.          Hospital Scheduled Meds:  [START ON 8/10/2024] azithromycin, 500 mg, Intravenous, Q24H  budesonide-formoterol, 2 puff, Inhalation, BID - RT  [START ON 8/10/2024] cefTRIAXone, 1,000 mg, Intravenous, Q24H  heparin (porcine), 5,000 Units, Subcutaneous, Q8H  ipratropium-albuterol, 3 mL, Nebulization, 4x Daily - RT  methylPREDNISolone sodium succinate, 40 mg, Intravenous, Q12H  sodium chloride, 10 mL, Intravenous, Q12H           Current listed hospital scheduled medications may not yet reflect those currently placed in orders that are signed and held awaiting patient's arrival to floor.   ---------------------------------------------------------------------------------------------------------------------     Objective     Vital Signs:  Temp:  [97.9 °F (36.6 °C)-98.5 °F (36.9 °C)] 98.5 °F (36.9 °C)  Heart Rate:  [] 93  Resp:  [20-34] 20  BP: (109-146)/(60-87) 141/60      08/09/24  0832 08/09/24  1239   Weight: 30.9 kg (68 lb 3.2 oz) 30.9 kg (68 lb 3.2 oz)     Body mass index is 12.89 kg/m².  ---------------------------------------------------------------------------------------------------------------------       Physical Exam  Vitals and nursing note reviewed.   Constitutional:       General: She is not in acute distress.  HENT:      Head: Normocephalic and atraumatic.   Eyes:      Extraocular Movements: Extraocular movements intact.   Cardiovascular:      Rate and Rhythm: Normal rate and regular rhythm.   Pulmonary:      Effort: Pulmonary effort is normal.   Abdominal:      Palpations: Abdomen is soft.   Musculoskeletal:     "  Right lower leg: No edema.      Left lower leg: No edema.   Skin:     General: Skin is warm and dry.   Neurological:      Mental Status: She is alert. Mental status is at baseline.   Psychiatric:         Mood and Affect: Mood normal.         Behavior: Behavior normal.             ---------------------------------------------------------------------------------------------------------------------  EKG:        I have personally looked at the EKG.  ---------------------------------------------------------------------------------------------------------------------   Results from last 7 days   Lab Units 08/09/24  0847 08/06/24  1958 08/06/24  1851   CRP mg/dL 19.18*  --  14.81*   LACTATE mmol/L 1.7  --   --    WBC 10*3/mm3 7.22 6.61  --    HEMOGLOBIN g/dL 14.4 12.6  --    HEMATOCRIT % 44.8 40.1  --    MCV fL 109.3* 110.5*  --    MCHC g/dL 32.1 31.4*  --    PLATELETS 10*3/mm3 156 133*  --    INR  0.96  --   --      Results from last 7 days   Lab Units 08/09/24  1031 08/09/24  0846 08/06/24  1932   PH, ARTERIAL pH units 7.385 7.394 7.407   PO2 ART mm Hg 74.1* 57.3* 72.6*   PCO2, ARTERIAL mm Hg 54.4* 57.5* 48.7*   HCO3 ART mmol/L 32.5* 35.1* 30.6*     Results from last 7 days   Lab Units 08/09/24  0847 08/06/24  1851   SODIUM mmol/L 141 139   POTASSIUM mmol/L 4.5 3.8   MAGNESIUM mg/dL 2.4  --    CHLORIDE mmol/L 99 102   CO2 mmol/L 30.4* 26.6   BUN mg/dL 21 14   CREATININE mg/dL 0.54* 0.48*   CALCIUM mg/dL 9.4 9.0   GLUCOSE mg/dL 127* 148*   ALBUMIN g/dL 3.7 3.5   BILIRUBIN mg/dL 0.6 0.5   ALK PHOS U/L 99 79   AST (SGOT) U/L 18 12   ALT (SGPT) U/L 14 6   Estimated Creatinine Clearance: 47.3 mL/min (A) (by C-G formula based on SCr of 0.54 mg/dL (L)).  No results found for: \"AMMONIA\"  Results from last 7 days   Lab Units 08/09/24  1045 08/09/24  0847 08/06/24  2126   HSTROP T ng/L <6 <6 6     Results from last 7 days   Lab Units 08/09/24  0847 08/06/24  1851   PROBNP pg/mL 361.5 182.5     Lab Results   Component Value Date " "   HGBA1C 5.60 12/11/2019     Lab Results   Component Value Date    TSH 4.400 (H) 11/21/2021    FREET4 1.41 12/11/2019     No results found for: \"PREGTESTUR\", \"PREGSERUM\", \"HCG\", \"HCGQUANT\"  Pain Management Panel           No data to display              No results found for: \"BLOODCX\"  No results found for: \"URINECX\"  No results found for: \"WOUNDCX\"  No results found for: \"STOOLCX\"      ---------------------------------------------------------------------------------------------------------------------  Imaging Results (Last 7 Days)       Procedure Component Value Units Date/Time    XR Chest 1 View [[id number]] Collected: 08/09/24 1022     Updated: 08/09/24 1025    Narrative:      EXAM:    XR Chest, 1 View     EXAM DATE:    8/9/2024 8:55 AM     CLINICAL HISTORY:    cough, sob     TECHNIQUE:    Frontal view of the chest.     COMPARISON:    8/6/2024     FINDINGS:    Lungs and pleural spaces:  Patchy and interstitial opacities both  lungs most consistent with superimposed atypical pneumonia and fibrosis.   COPD with hyperinflation.  No effusion or pneumothorax.    Heart:  Unremarkable as visualized.  No cardiomegaly.    Mediastinum:  Unremarkable as visualized.  Normal mediastinal contour.    Bones/joints:  Unremarkable as visualized.  No acute fracture.       Impression:      1.  No effusion or pneumothorax.  2.  Patchy and interstitial opacities both lungs most consistent with  superimposed atypical pneumonia and fibrosis.  3.  COPD with hyperinflation.        This report was finalized on 8/9/2024 10:23 AM by Dr. Loki Kent MD.               Cultures:  No results found for: \"BLOODCX\", \"URINECX\", \"WOUNDCX\", \"MRSACX\", \"RESPCX\", \"STOOLCX\"    Last echocardiogram:  Results for orders placed during the hospital encounter of 02/24/21    Adult Transthoracic Echo Complete W/ Cont if Necessary Per Protocol    Interpretation Summary  · Left ventricular ejection fraction appears to be 56 - 60%. Left ventricular systolic " function is normal.  · There is no evidence of pericardial effusion.  · No significant functional valvular abnormalities noted  · No previous study          I have personally reviewed the above radiology images and read the final radiology report on 08/09/24  ---------------------------------------------------------------------------------------------------------------------  Assessment / Plan     Active Hospital Problems    Diagnosis  POA    **Acute respiratory failure with hypoxia [J96.01]  Yes       ASSESSMENT/PLAN:    Acute COPD exacerbation  Bilateral atypical pneumonia  Acute on chronic respiratory failure with hypoxia  Elevated D-dimer, rule out PE  Patient with recent ED visit secondary to COPD exacerbation presents after symptoms had not improved over the next 3 days.  On arrival to the ED she was tachycardic up to 121 with tachypnea up to 34.  SpO2 was 91% on baseline 2 L.  ABG did show pO2 57.3 and pCO2 57.5 on 2.5 L.  BiPAP was trialed though patient could not tolerate and was subsequently placed on heated high flow nasal cannula.  On laboratory evaluation CRP was 19.18, no leukocytosis.  Showed patchy bilateral infiltrates consistent with atypical pneumonia.  D-dimer was elevated at 1.95  She received azithromycin, Rocephin, DuoNeb, 125 mg Solu-Medrol and half liter normal saline bolus in the ED.  Admit to the telemetry unit for further workup and management  Continue azithromycin and Rocephin  Continue Symbicort, DuoNebs, Solu-Medrol at 40 mg twice daily  Continue to titrate supplemental O2 as needed-wean to baseline as able  Given elevated D-dimer will evaluate further for PE with CT PE protocol  Encourage incentive spirometry, cough/deep breathing  Trend labs  Continue supportive care measures    Chronic:  Peripheral vascular disease  Hyperlipidemia  Hx stroke  Plan to continue home medication regimen as indicated once med rec is complete  ----------  -DVT prophylaxis: Subcu heparin  -Activity: As  tolerated  -Expected length of stay: INPATIENT status due to the need for care which can only be reasonably provided in an hospital setting such as aggressive/expedited ancillary services and/or consultation services, the necessity for IV medications, close physician monitoring and/or the possible need for procedures.  In such, I feel patient’s risk for adverse outcomes and need for care warrant INPATIENT evaluation and predict the patient’s care encounter to likely last beyond 2 midnights.   -Disposition pending course and further work up     High risk secondary to COPD ED, bilateral atypical pneumonia, acute on chronic respiratory failure with hypoxia, elevated D-dimer concern for PE    There are no questions and answers to display.       Unruly Nair PA-C   08/09/24  14:04 EDT     Electronically signed by Jay Jay Linder MD at 08/09/24 1617          Emergency Department Notes        Saul Noble, VENITA at 08/09/24 1232          Patient taken off heated high flow for transport to floor. Placed on 2L    Electronically signed by Saul Noble RN at 08/09/24 1232       Saul Noble RN at 08/09/24 0930            Placed on heated;high-flow nasal cannula. Work of breathing improved at this time.       Electronically signed by Saul Noble RN at 08/09/24 1021       Saul Noble RN at 08/09/24 0900          Decision made to attempt patient on BiPaP machine for work of breathing. Patient did not tolerate. Provider aware. Alternative modality to be attempted by respiratory therapist.     Electronically signed by Saul Noble RN at 08/09/24 0919       Saul Noble RN at 08/09/24 0854          Patient called out and advised she had urinated. Removed from bedpan. Clean and dry.     Electronically signed by Saul Noble RN at 08/09/24 0917       Saul Noble RN at 08/09/24 0840          Patient placed on bedpan at this time. Call light within reach. Educated on calling out after  urinating.    Electronically signed by Saul Noble RN at 08/09/24 0843       Carol Suarez PA at 08/09/24 0887       Attestation signed by Em Barfield MD at 08/09/24 1602        SUPERVISE: For this patient encounter, I reviewed the APC's documentation, treatment plan, and medical decision making..  Em Barfield MD 8/9/2024 16:07 EDT                         Subjective   History of Present Illness  69-year-old female who presents to the ED today for shortness of breath.  She states she has a history of COPD and wears 2 L of oxygen at all times.  She was seen here in the ED on August 6 and diagnosed with a COPD exacerbation.  She was discharged home on prednisone and doxycycline.  She reports that she has been taking it as prescribed but states that she seems to be feeling worse.  She states her shortness of breath is worsening and she does have some wheezing.  She states she also has a cough that is productive of sputum.  She denies any known fever.  She states she had an episode of disorientation this morning.  She states she was walking through her house and felt like she could not remember what she was supposed to be doing or where she was at.  She is also concerned that she may have a UTI.  She states since last night she has had increased urinary urgency and frequency with dysuria.  She has had some episodes of incontinence.  Patient reports that she smokes about half a pack of cigarettes per day but has not smoked in the last week.  She also states that she has noticed some slight swelling of her feet.    History provided by:  Patient  Shortness of Breath  Severity:  Moderate  Onset quality:  Gradual  Duration:  3 days  Timing:  Constant  Progression:  Worsening  Chronicity:  New  Relieved by:  Nothing  Worsened by:  Nothing  Associated symptoms: cough, sputum production and wheezing    Associated symptoms: no abdominal pain, no chest pain, no diaphoresis, no ear pain, no fever, no headaches, no  hemoptysis, no neck pain, no rash, no sore throat, no syncope, no swollen glands and no vomiting    Risk factors: tobacco use        Review of Systems   Constitutional:  Positive for fatigue. Negative for diaphoresis and fever.   HENT: Negative.  Negative for ear pain and sore throat.    Eyes: Negative.    Respiratory:  Positive for cough, sputum production, shortness of breath and wheezing. Negative for hemoptysis.    Cardiovascular:  Positive for leg swelling. Negative for chest pain, palpitations and syncope.   Gastrointestinal: Negative.  Negative for abdominal pain and vomiting.   Genitourinary: Negative.    Musculoskeletal: Negative.  Negative for neck pain.   Skin: Negative.  Negative for rash.   Neurological:  Negative for headaches.   Psychiatric/Behavioral:  Positive for confusion.    All other systems reviewed and are negative.      Past Medical History:   Diagnosis Date    COPD (chronic obstructive pulmonary disease)     Hyperlipidemia     Peripheral vascular disease     Stroke        Allergies   Allergen Reactions    Codeine Swelling    Latex Hives    Pletal [Cilostazol] Rash     Welps       Past Surgical History:   Procedure Laterality Date    CARDIAC CATHETERIZATION Left 4/6/2021    Procedure: Peripheral angiography;  Surgeon: Dae Callahan MD;  Location: [facility] INVASIVE LOCATION;  Service: Cardiovascular;  Laterality: Left;       Family History   Problem Relation Age of Onset    Heart disease Mother     Hypertension Mother     Diabetes Mother     Breast cancer Neg Hx        Social History     Socioeconomic History    Marital status:     Number of children: 3   Tobacco Use    Smoking status: Every Day     Current packs/day: 0.25     Average packs/day: 0.3 packs/day for 50.0 years (12.5 ttl pk-yrs)     Types: Cigarettes    Smokeless tobacco: Never   Substance and Sexual Activity    Alcohol use: No    Drug use: No    Sexual activity: Defer           Objective   Physical  Exam  Vitals and nursing note reviewed.   Constitutional:       General: She is in acute distress.      Appearance: She is underweight. She is ill-appearing.   HENT:      Head: Normocephalic and atraumatic.      Mouth/Throat:      Mouth: Mucous membranes are moist.      Pharynx: Oropharynx is clear.   Eyes:      Extraocular Movements: Extraocular movements intact.      Pupils: Pupils are equal, round, and reactive to light.   Neck:      Vascular: No JVD.      Trachea: No tracheal deviation.   Cardiovascular:      Rate and Rhythm: Regular rhythm. Tachycardia present.      Pulses: Normal pulses.      Heart sounds: Normal heart sounds.   Pulmonary:      Effort: Tachypnea and accessory muscle usage present.      Breath sounds: No stridor. Examination of the right-upper field reveals wheezing. Examination of the left-upper field reveals wheezing. Wheezing present.      Comments: Patient can only speak in 1-2 word sentences  Chest:      Chest wall: No tenderness.   Abdominal:      General: Bowel sounds are normal.      Palpations: Abdomen is soft.      Tenderness: There is no abdominal tenderness.   Musculoskeletal:         General: Normal range of motion.      Cervical back: Normal range of motion and neck supple.      Comments: Very mild swelling to bilateral ankles noted   Lymphadenopathy:      Cervical: No cervical adenopathy.   Skin:     General: Skin is warm and dry.      Capillary Refill: Capillary refill takes less than 2 seconds.   Neurological:      General: No focal deficit present.      Mental Status: She is alert and oriented to person, place, and time.   Psychiatric:         Mood and Affect: Mood normal.         Procedures      Results for orders placed or performed during the hospital encounter of 08/09/24   Respiratory Panel PCR w/COVID-19(SARS-CoV-2) JENNIFER/KAMALJIT/CESAR/PAD/COR/MESERET In-House, NP Swab in UTM/VTM, 2 HR TAT - Swab, Nasopharynx    Specimen: Nasopharynx; Swab   Result Value Ref Range    ADENOVIRUS, PCR  Not Detected Not Detected    Coronavirus 229E Not Detected Not Detected    Coronavirus HKU1 Not Detected Not Detected    Coronavirus NL63 Not Detected Not Detected    Coronavirus OC43 Not Detected Not Detected    COVID19 Not Detected Not Detected - Ref. Range    Human Metapneumovirus Not Detected Not Detected    Human Rhinovirus/Enterovirus Not Detected Not Detected    Influenza A PCR Not Detected Not Detected    Influenza B PCR Not Detected Not Detected    Parainfluenza Virus 1 Not Detected Not Detected    Parainfluenza Virus 2 Not Detected Not Detected    Parainfluenza Virus 3 Not Detected Not Detected    Parainfluenza Virus 4 Not Detected Not Detected    RSV, PCR Not Detected Not Detected    Bordetella pertussis pcr Not Detected Not Detected    Bordetella parapertussis PCR Not Detected Not Detected    Chlamydophila pneumoniae PCR Not Detected Not Detected    Mycoplasma pneumo by PCR Not Detected Not Detected   Respiratory Culture - Sputum, Bronchus    Specimen: Bronchus; Sputum   Result Value Ref Range    Gram Stain Many (4+) WBCs seen     Gram Stain Rare (1+) Epithelial cells seen     Gram Stain Many (4+) Gram negative bacilli     Gram Stain Rare (1+) Mixed gram positive deann    Urinalysis With Culture If Indicated - Urine, Clean Catch    Specimen: Urine, Clean Catch   Result Value Ref Range    Color, UA Dark Yellow (A) Yellow, Straw    Appearance, UA Cloudy (A) Clear    pH, UA 5.5 5.0 - 8.0    Specific Gravity, UA >=1.030 1.005 - 1.030    Glucose, UA Negative Negative    Ketones, UA Trace (A) Negative    Bilirubin, UA Small (1+) (A) Negative    Blood, UA Moderate (2+) (A) Negative    Protein, UA >=300 mg/dL (3+) (A) Negative    Leuk Esterase, UA Negative Negative    Nitrite, UA Negative Negative    Urobilinogen, UA 1.0 E.U./dL 0.2 - 1.0 E.U./dL   Comprehensive Metabolic Panel    Specimen: Blood   Result Value Ref Range    Glucose 127 (H) 65 - 99 mg/dL    BUN 21 8 - 23 mg/dL    Creatinine 0.54 (L) 0.57 - 1.00  mg/dL    Sodium 141 136 - 145 mmol/L    Potassium 4.5 3.5 - 5.2 mmol/L    Chloride 99 98 - 107 mmol/L    CO2 30.4 (H) 22.0 - 29.0 mmol/L    Calcium 9.4 8.6 - 10.5 mg/dL    Total Protein 7.6 6.0 - 8.5 g/dL    Albumin 3.7 3.5 - 5.2 g/dL    ALT (SGPT) 14 1 - 33 U/L    AST (SGOT) 18 1 - 32 U/L    Alkaline Phosphatase 99 39 - 117 U/L    Total Bilirubin 0.6 0.0 - 1.2 mg/dL    Globulin 3.9 gm/dL    A/G Ratio 0.9 g/dL    BUN/Creatinine Ratio 38.9 (H) 7.0 - 25.0    Anion Gap 11.6 5.0 - 15.0 mmol/L    eGFR 99.2 >60.0 mL/min/1.73   Lactic Acid, Plasma    Specimen: Blood   Result Value Ref Range    Lactate 1.7 0.5 - 2.0 mmol/L   CBC Auto Differential    Specimen: Blood   Result Value Ref Range    WBC 7.22 3.40 - 10.80 10*3/mm3    RBC 4.10 3.77 - 5.28 10*6/mm3    Hemoglobin 14.4 12.0 - 15.9 g/dL    Hematocrit 44.8 34.0 - 46.6 %    .3 (H) 79.0 - 97.0 fL    MCH 35.1 (H) 26.6 - 33.0 pg    MCHC 32.1 31.5 - 35.7 g/dL    RDW 13.2 12.3 - 15.4 %    RDW-SD 53.2 37.0 - 54.0 fl    MPV 10.2 6.0 - 12.0 fL    Platelets 156 140 - 450 10*3/mm3   Blood Gas, Arterial With Co-Ox    Specimen: Arterial Blood   Result Value Ref Range    Site Left Radial     Norman's Test Positive     pH, Arterial 7.394 7.350 - 7.450 pH units    pCO2, Arterial 57.5 (H) 35.0 - 45.0 mm Hg    pO2, Arterial 57.3 (L) 83.0 - 108.0 mm Hg    HCO3, Arterial 35.1 (H) 20.0 - 26.0 mmol/L    Base Excess, Arterial 8.3 (H) 0.0 - 2.0 mmol/L    O2 Saturation, Arterial 89.6 (L) 94.0 - 99.0 %    Hemoglobin, Blood Gas 12.7 (L) 13.5 - 17.5 g/dL    Hematocrit, Blood Gas 39.0 38.0 - 51.0 %    Oxyhemoglobin 87.8 (L) 94 - 99 %    Methemoglobin 0.30 0.00 - 3.00 %    Carboxyhemoglobin 1.7 0 - 5 %    A-a DO2 81.7 0.0 - 300.0 mmHg    CO2 Content 36.9 (H) 22 - 33 mmol/L    Barometric Pressure for Blood Gas 724 mmHg    Modality Nasal Cannula     FIO2 30 %    Flow Rate 2.5 lpm    Ventilator Mode NA     Collected by [id number]     pH, Temp Corrected      pCO2, Temperature Corrected      pO2,  Temperature Corrected     Protime-INR    Specimen: Blood   Result Value Ref Range    Protime 12.9 12.1 - 14.7 Seconds    INR 0.96 0.90 - 1.10   aPTT    Specimen: Blood   Result Value Ref Range    PTT 30.7 26.5 - 34.5 seconds   BNP    Specimen: Blood   Result Value Ref Range    proBNP 361.5 0.0 - 900.0 pg/mL   High Sensitivity Troponin T    Specimen: Blood   Result Value Ref Range    HS Troponin T <6 <14 ng/L   C-reactive Protein    Specimen: Blood   Result Value Ref Range    C-Reactive Protein 19.18 (H) 0.00 - 0.50 mg/dL   Magnesium    Specimen: Blood   Result Value Ref Range    Magnesium 2.4 1.6 - 2.4 mg/dL   Manual Differential    Specimen: Blood   Result Value Ref Range    Neutrophil % 84.0 (H) 42.7 - 76.0 %    Lymphocyte % 8.0 (L) 19.6 - 45.3 %    Monocyte % 5.0 5.0 - 12.0 %    Bands %  3.0 0.0 - 5.0 %    Neutrophils Absolute 6.28 1.70 - 7.00 10*3/mm3    Lymphocytes Absolute 0.58 (L) 0.70 - 3.10 10*3/mm3    Monocytes Absolute 0.36 0.10 - 0.90 10*3/mm3    Macrocytes Slight/1+ None Seen    Platelet Estimate Adequate Normal   Urinalysis, Microscopic Only - Urine, Clean Catch    Specimen: Urine, Clean Catch   Result Value Ref Range    RBC, UA 11-20 (A) None Seen, 0-2 /HPF    WBC, UA 0-2 None Seen, 0-2 /HPF    Bacteria, UA Trace (A) None Seen /HPF    Squamous Epithelial Cells, UA 13-20 (A) None Seen, 0-2 /HPF    Hyaline Casts, UA None Seen None Seen /LPF    Methodology Manual Light Microscopy    High Sensitivity Troponin T 2Hr    Specimen: Blood   Result Value Ref Range    HS Troponin T <6 <14 ng/L    Troponin T Delta     Blood Gas, Arterial With Co-Ox    Specimen: Arterial Blood   Result Value Ref Range    Site Left Brachial     Norman's Test N/A     pH, Arterial 7.385 7.350 - 7.450 pH units    pCO2, Arterial 54.4 (H) 35.0 - 45.0 mm Hg    pO2, Arterial 74.1 (L) 83.0 - 108.0 mm Hg    HCO3, Arterial 32.5 (H) 20.0 - 26.0 mmol/L    Base Excess, Arterial 6.2 (H) 0.0 - 2.0 mmol/L    O2 Saturation, Arterial 95.0 94.0 - 99.0  %    Hemoglobin, Blood Gas 11.2 (L) 13.5 - 17.5 g/dL    Hematocrit, Blood Gas 34.4 (L) 38.0 - 51.0 %    Oxyhemoglobin 93.2 (L) 94 - 99 %    Methemoglobin 0.30 0.00 - 3.00 %    Carboxyhemoglobin 1.6 0 - 5 %    A-a DO2 68.5 0.0 - 300.0 mmHg    CO2 Content 34.2 (H) 22 - 33 mmol/L    Barometric Pressure for Blood Gas 725 mmHg    Modality Heated HFNC     FIO2 30 %    Flow Rate 30.0 lpm    Ventilator Mode NA     Collected by [id number]     pH, Temp Corrected      pCO2, Temperature Corrected      pO2, Temperature Corrected     ECG 12 Lead Tachycardia   Result Value Ref Range    QT Interval 332 ms    QTC Interval 426 ms   Green Top (Gel)   Result Value Ref Range    Extra Tube Hold for add-ons.    Lavender Top   Result Value Ref Range    Extra Tube hold for add-on    Gold Top - SST   Result Value Ref Range    Extra Tube Hold for add-ons.    Light Blue Top   Result Value Ref Range    Extra Tube Hold for add-ons.           ED Course  ED Course as of 08/09/24 1203   Fri Aug 09, 2024   0854 Plan is start on BIPAP for work of breathing [AH]   0856   EKG ED Interpretation by: Em Barfield MD on 08/09/24 at 08:53 AM  EKG: HR 99, unchanged from previous tracings, sinus rhythm, short OR, grossly unchanged from priors, no acute ST segment changes.     Electronically signed by Em Barfield MD, 08/09/24, 8:57 AM EDT.   [KH]   0909 Patient could not tolerate the BIPAP - will try high flow NC [AH]   0932 Patient currently on heated HFNC at 30 LPM and is tolerating well. Her respiratory rate has improved. She is on 28% oxygen and pulse ox is showing 98% at this time - will get repeat ABG in an hour. [AH]   1027 XR Chest 1 View  FINDINGS:    Lungs and pleural spaces:  Patchy and interstitial opacities both  lungs most consistent with superimposed atypical pneumonia and fibrosis.   COPD with hyperinflation.  No effusion or pneumothorax.    Heart:  Unremarkable as visualized.  No cardiomegaly.    Mediastinum:  Unremarkable as visualized.   Normal mediastinal contour.    Bones/joints:  Unremarkable as visualized.  No acute fracture.     IMPRESSION:  1.  No effusion or pneumothorax.  2.  Patchy and interstitial opacities both lungs most consistent with  superimposed atypical pneumonia and fibrosis.  3.  COPD with hyperinflation.   []   1048 Dr. Linder to admit []      ED Course User Index  [] Carol Suarez PA  [] Em Barfield MD                                             Medical Decision Making  69-year-old female who presents to the ED today for worsening shortness of breath.  She does have a history of COPD.  She is also concerned that she may have a UTI.  The patient presented to the ED tachypneic and using accessory muscles.  She could only speak in 1-2 word sentences.  She was given a DuoNeb breathing treatment and IV Solu-Medrol.  Plan was to start her on BiPAP due to her work of breathing.  Unfortunately the patient could not tolerate the BiPAP therefore she was started on heated high flow nasal cannula.  She has tolerated this very well and her work of breathing has greatly improved.  She does report that she is feeling better.  Chest x-ray is consistent with an atypical pneumonia.  Respiratory panel is negative.  She was started on IV Rocephin and azithromycin.  No evidence of a UTI at this time.  I discussed the patient with Dr. Linder who [facility].    Problems Addressed:  COPD with acute exacerbation: complicated acute illness or injury  Pneumonia of both lungs due to infectious organism, unspecified part of lung: complicated acute illness or injury    Amount and/or Complexity of Data Reviewed  Labs: ordered.  Radiology: ordered. Decision-making details documented in ED Course.  ECG/medicine tests: ordered.    Risk  Prescription drug management.  Decision regarding hospitalization.        Final diagnoses:   COPD with acute exacerbation   Pneumonia of both lungs due to infectious organism, unspecified part of lung        ED Disposition  ED Disposition       ED Disposition   Decision to Admit    Condition   --    Comment   Level of Care: Med/Surg [1]   Diagnosis: Acute respiratory failure with hypoxia [896795]   Certification: I Certify That Inpatient Hospital Services Are Medically Necessary For Greater Than 2 Midnights                 No follow-up provider specified.       Medication List      No changes were made to your prescriptions during this visit.            Carol Suarez PA  08/09/24 1203      Electronically signed by Em Barfield MD at 08/09/24 1607       Current Facility-Administered Medications   Medication Dose Route Frequency Provider Last Rate Last Admin    azithromycin (ZITHROMAX) 500 mg in sodium chloride 0.9 % 250 mL IVPB-VTB  500 mg Intravenous Q24H Jay Jay Linder MD        sennosides-docusate (PERICOLACE) 8.6-50 MG per tablet 2 tablet  2 tablet Oral BID PRN Jay Jay Linder MD        And    polyethylene glycol (MIRALAX) packet 17 g  17 g Oral Daily PRN Jay Jay Linder MD        And    bisacodyl (DULCOLAX) EC tablet 5 mg  5 mg Oral Daily PRN Jay Jay Linder MD        And    bisacodyl (DULCOLAX) suppository 10 mg  10 mg Rectal Daily PRN Jay Jay Linder MD        budesonide-formoterol (SYMBICORT) 160-4.5 MCG/ACT inhaler 2 puff  2 puff Inhalation BID - RT Jay Jay Linder MD   2 puff at 08/10/24 0647    Calcium Replacement - Follow Nurse / BPA Driven Protocol   Does not apply PRN Jay Jay Linder MD        cefTRIAXone (ROCEPHIN) 1,000 mg in sodium chloride 0.9 % 100 mL IVPB-VTB  1,000 mg Intravenous Q24H Jay Jay Linder  mL/hr at 08/10/24 0955 1,000 mg at 08/10/24 0955    heparin (porcine) 5000 UNIT/ML injection 5,000 Units  5,000 Units Subcutaneous Q8H Jay Jay Linder MD   5,000 Units at 08/09/24 2112    ipratropium-albuterol (DUO-NEB) nebulizer solution 3 mL  3 mL Nebulization 4x Daily - RT Jay Jay Linder MD   3 mL at 08/10/24 0647    Magnesium Standard Dose Replacement - Follow Nurse / BPA Driven  Protocol   Does not apply PRN Jay Jay Linder MD        methylPREDNISolone sodium succinate (SOLU-Medrol) injection 40 mg  40 mg Intravenous Q12H Jay Jay Linder MD   40 mg at 08/10/24 0954    Phosphorus Replacement - Follow Nurse / BPA Driven Protocol   Does not apply PRJay Jay Stroud MD        Potassium Replacement - Follow Nurse / BPA Driven Protocol   Does not apply PRN Jay Jay Linder MD        sodium chloride 0.9 % flush 10 mL  10 mL Intravenous PRN Em Barfield MD        sodium chloride 0.9 % flush 10 mL  10 mL Intravenous PRN Carol Suarez PA        sodium chloride 0.9 % flush 10 mL  10 mL Intravenous Q12H Jay Jay Linder MD   10 mL at 08/10/24 0955    sodium chloride 0.9 % flush 10 mL  10 mL Intravenous PRN Jay Jay Linder MD        sodium chloride 0.9 % infusion 40 mL  40 mL Intravenous PRN Jay Jay Linder MD         Orders (last 24 hrs)        Start     Ordered    08/10/24 1000  azithromycin (ZITHROMAX) 500 mg in sodium chloride 0.9 % 250 mL IVPB-VTB  Every 24 Hours         08/09/24 1254    08/10/24 0900  cefTRIAXone (ROCEPHIN) 1,000 mg in sodium chloride 0.9 % 100 mL IVPB-VTB  Every 24 Hours         08/09/24 1254    08/10/24 0600  Basic Metabolic Panel  Morning Draw         08/09/24 1254    08/10/24 0600  CBC Auto Differential  Morning Draw         08/09/24 1254    08/10/24 0111  Scan Slide  Once,   Status:  Canceled         08/10/24 0110    08/09/24 2100  methylPREDNISolone sodium succinate (SOLU-Medrol) injection 40 mg  Every 12 Hours         08/09/24 1254    08/09/24 1800  Oral Care  2 Times Daily       08/09/24 1254    08/09/24 1800  Dietary Nutrition Supplements Boost Plus (Ensure Enlive, Ensure Plus); chocolate  Daily With Breakfast, Lunch & Dinner       08/09/24 1412    08/09/24 1630  ipratropium-albuterol (DUO-NEB) nebulizer solution 3 mL  4 Times Daily - RT         08/09/24 1254    08/09/24 1630  iopamidol (ISOVUE-370) 76 % injection 100 mL  Once in Imaging         08/09/24 1544    08/09/24  1616  Nutrition Consult  Once        Provider:  (Not yet assigned)    08/09/24 1615    08/09/24 1600  Vital Signs  Every 4 Hours       08/09/24 1254    08/09/24 1558  Code Status and Medical Interventions: No CPR (Do Not Attempt to Resuscitate); Limited Support; No intubation (DNI)  Continuous         08/09/24 1557    08/09/24 1415  CT Angiogram Chest Pulmonary Embolism  1 Time Imaging         08/09/24 1415    08/09/24 1400  heparin (porcine) 5000 UNIT/ML injection 5,000 Units  Every 8 Hours Scheduled         08/09/24 1254    08/09/24 1400  Incentive Spirometry  Every 4 Hours While Awake       08/09/24 1254    08/09/24 1345  sodium chloride 0.9 % flush 10 mL  Every 12 Hours Scheduled         08/09/24 1254    08/09/24 1345  budesonide-formoterol (SYMBICORT) 160-4.5 MCG/ACT inhaler 2 puff  2 Times Daily - RT         08/09/24 1254    08/09/24 1255  Intake & Output  Every Shift       08/09/24 1254    08/09/24 1255  Weigh Patient  Once         08/09/24 1254    08/09/24 1255  Insert Peripheral IV  Once         08/09/24 1254    08/09/24 1255  Saline Lock & Maintain IV Access  Continuous         08/09/24 1254    08/09/24 1255  Continuous Pulse Oximetry  Continuous         08/09/24 1254    08/09/24 1255  Diet: Regular/House; Fluid Consistency: Thin (IDDSI 0)  Diet Effective Now         08/09/24 1254    08/09/24 1255  D-dimer, Quantitative  Once         08/09/24 1254    08/09/24 1254  Potassium Replacement - Follow Nurse / BPA Driven Protocol  As Needed         08/09/24 1254    08/09/24 1254  Magnesium Standard Dose Replacement - Follow Nurse / BPA Driven Protocol  As Needed         08/09/24 1254    08/09/24 1254  Phosphorus Replacement - Follow Nurse / BPA Driven Protocol  As Needed         08/09/24 1254    08/09/24 1254  Calcium Replacement - Follow Nurse / BPA Driven Protocol  As Needed         08/09/24 1254    08/09/24 1254  sennosides-docusate (PERICOLACE) 8.6-50 MG per tablet 2 tablet  2 Times Daily PRN        Placed in  "\"And\" Linked Group    08/09/24 1254    08/09/24 1254  polyethylene glycol (MIRALAX) packet 17 g  Daily PRN        Placed in \"And\" Linked Group    08/09/24 1254    08/09/24 1254  bisacodyl (DULCOLAX) EC tablet 5 mg  Daily PRN        Placed in \"And\" Linked Group    08/09/24 1254    08/09/24 1254  bisacodyl (DULCOLAX) suppository 10 mg  Daily PRN        Placed in \"And\" Linked Group    08/09/24 1254    08/09/24 1254  sodium chloride 0.9 % flush 10 mL  As Needed         08/09/24 1254    08/09/24 1254  sodium chloride 0.9 % infusion 40 mL  As Needed         08/09/24 1254    08/09/24 1051  Inpatient Admission  Once         08/09/24 1057    08/09/24 1047  High Sensitivity Troponin T 2Hr  PROCEDURE ONCE         08/09/24 0922    08/09/24 1044  azithromycin (ZITHROMAX) 500 mg in sodium chloride 0.9 % 250 mL IVPB-VTB  Once         08/09/24 1028    08/09/24 1033  Blood Gas, Arterial With Co-Ox  PROCEDURE ONCE         08/09/24 1031    08/09/24 1030  Blood Gas, Arterial -With Co-Ox Panel: Yes  Timed         08/09/24 0931    08/09/24 0854  sodium chloride 0.9 % flush 10 mL  As Needed        Placed in \"And\" Linked Group    08/09/24 0854    08/09/24 0838  sodium chloride 0.9 % flush 10 mL  As Needed         08/09/24 0838    Unscheduled  Oxygen Therapy- Heated High Flow Nasal Cannula; Titrate 30-60 LPM Per SpO2; 90 - 95%  Continuous PRN       08/09/24 0932    Unscheduled  Oxygen Therapy- Nasal Cannula; Titrate 1-6 LPM Per SpO2; 90 - 95%  Continuous PRN       08/09/24 1254    Pending  aspirin-dipyridamole (AGGRENOX)  MG per 12 hr capsule 1 capsule  2 Times Daily         Pending    Pending  rosuvastatin (CRESTOR) tablet 40 mg  Every Other Day         Pending                  Physician Progress Notes (last 24 hours)  Notes from 08/09/24 1023 through 08/10/24 1023   No notes of this type exist for this encounter.       Consult Notes (last 24 hours)  Notes from 08/09/24 1023 through 08/10/24 1023   No notes of this type exist for " this encounter.

## 2024-08-10 NOTE — PLAN OF CARE
Goal Outcome Evaluation:  Patient is currently resting in bed. No S&S of distress noted at this time. No complaints at this time. Plan of care ongoing.

## 2024-08-11 ENCOUNTER — APPOINTMENT (OUTPATIENT)
Dept: GENERAL RADIOLOGY | Facility: HOSPITAL | Age: 69
DRG: 193 | End: 2024-08-11
Payer: MEDICARE

## 2024-08-11 LAB
A-A DO2: 67.2 MMHG (ref 0–300)
ANION GAP SERPL CALCULATED.3IONS-SCNC: 6.9 MMOL/L (ref 5–15)
ARTERIAL PATENCY WRIST A: POSITIVE
ATMOSPHERIC PRESS: 729 MMHG
BACTERIA SPEC RESP CULT: ABNORMAL
BACTERIA SPEC RESP CULT: ABNORMAL
BASE EXCESS BLDA CALC-SCNC: 8.7 MMOL/L (ref 0–2)
BASOPHILS # BLD AUTO: 0.02 10*3/MM3 (ref 0–0.2)
BASOPHILS NFR BLD AUTO: 0.3 % (ref 0–1.5)
BDY SITE: ABNORMAL
BUN SERPL-MCNC: 21 MG/DL (ref 8–23)
BUN/CREAT SERPL: 46.7 (ref 7–25)
CALCIUM SPEC-SCNC: 8.5 MG/DL (ref 8.6–10.5)
CHLORIDE SERPL-SCNC: 107 MMOL/L (ref 98–107)
CO2 BLDA-SCNC: 36.1 MMOL/L (ref 22–33)
CO2 SERPL-SCNC: 30.1 MMOL/L (ref 22–29)
COHGB MFR BLD: 1.4 % (ref 0–5)
CREAT SERPL-MCNC: 0.45 MG/DL (ref 0.57–1)
DEPRECATED RDW RBC AUTO: 52.9 FL (ref 37–54)
EGFRCR SERPLBLD CKD-EPI 2021: 103.6 ML/MIN/1.73
EOSINOPHIL # BLD AUTO: 0 10*3/MM3 (ref 0–0.4)
EOSINOPHIL NFR BLD AUTO: 0 % (ref 0.3–6.2)
ERYTHROCYTE [DISTWIDTH] IN BLOOD BY AUTOMATED COUNT: 13 % (ref 12.3–15.4)
FOLATE SERPL-MCNC: 13.7 NG/ML (ref 4.78–24.2)
GAS FLOW AIRWAY: 2 LPM
GLUCOSE SERPL-MCNC: 158 MG/DL (ref 65–99)
GRAM STN SPEC: ABNORMAL
HCO3 BLDA-SCNC: 34.5 MMOL/L (ref 20–26)
HCT VFR BLD AUTO: 31.6 % (ref 34–46.6)
HCT VFR BLD CALC: 31 % (ref 38–51)
HGB BLD-MCNC: 10 G/DL (ref 12–15.9)
HGB BLDA-MCNC: 10.1 G/DL (ref 13.5–17.5)
IMM GRANULOCYTES # BLD AUTO: 0.05 10*3/MM3 (ref 0–0.05)
IMM GRANULOCYTES NFR BLD AUTO: 0.8 % (ref 0–0.5)
INHALED O2 CONCENTRATION: 28 %
LYMPHOCYTES # BLD AUTO: 0.25 10*3/MM3 (ref 0.7–3.1)
LYMPHOCYTES NFR BLD AUTO: 3.8 % (ref 19.6–45.3)
Lab: ABNORMAL
MCH RBC QN AUTO: 34.8 PG (ref 26.6–33)
MCHC RBC AUTO-ENTMCNC: 31.6 G/DL (ref 31.5–35.7)
MCV RBC AUTO: 110.1 FL (ref 79–97)
METHGB BLD QL: 0.1 % (ref 0–3)
MODALITY: ABNORMAL
MONOCYTES # BLD AUTO: 0.19 10*3/MM3 (ref 0.1–0.9)
MONOCYTES NFR BLD AUTO: 2.9 % (ref 5–12)
NEUTROPHILS NFR BLD AUTO: 6.08 10*3/MM3 (ref 1.7–7)
NEUTROPHILS NFR BLD AUTO: 92.2 % (ref 42.7–76)
NRBC BLD AUTO-RTO: 0 /100 WBC (ref 0–0.2)
OXYHGB MFR BLDV: 92 % (ref 94–99)
PCO2 BLDA: 53.3 MM HG (ref 35–45)
PCO2 TEMP ADJ BLD: ABNORMAL MM[HG]
PH BLDA: 7.42 PH UNITS (ref 7.35–7.45)
PH, TEMP CORRECTED: ABNORMAL
PLATELET # BLD AUTO: 137 10*3/MM3 (ref 140–450)
PMV BLD AUTO: 11.9 FL (ref 6–12)
PO2 BLDA: 64.3 MM HG (ref 83–108)
PO2 TEMP ADJ BLD: ABNORMAL MM[HG]
POTASSIUM SERPL-SCNC: 4.4 MMOL/L (ref 3.5–5.2)
RBC # BLD AUTO: 2.87 10*6/MM3 (ref 3.77–5.28)
SAO2 % BLDCOA: 93.4 % (ref 94–99)
SODIUM SERPL-SCNC: 144 MMOL/L (ref 136–145)
VENTILATOR MODE: ABNORMAL
VIT B12 BLD-MCNC: 653 PG/ML (ref 211–946)
WBC NRBC COR # BLD AUTO: 6.59 10*3/MM3 (ref 3.4–10.8)

## 2024-08-11 PROCEDURE — 25810000003 SODIUM CHLORIDE 0.9 % SOLUTION 250 ML FLEX CONT: Performed by: INTERNAL MEDICINE

## 2024-08-11 PROCEDURE — 83050 HGB METHEMOGLOBIN QUAN: CPT

## 2024-08-11 PROCEDURE — 36600 WITHDRAWAL OF ARTERIAL BLOOD: CPT

## 2024-08-11 PROCEDURE — 85025 COMPLETE CBC W/AUTO DIFF WBC: CPT | Performed by: INTERNAL MEDICINE

## 2024-08-11 PROCEDURE — 94761 N-INVAS EAR/PLS OXIMETRY MLT: CPT

## 2024-08-11 PROCEDURE — 99232 SBSQ HOSP IP/OBS MODERATE 35: CPT | Performed by: INTERNAL MEDICINE

## 2024-08-11 PROCEDURE — 94799 UNLISTED PULMONARY SVC/PX: CPT

## 2024-08-11 PROCEDURE — 80048 BASIC METABOLIC PNL TOTAL CA: CPT | Performed by: INTERNAL MEDICINE

## 2024-08-11 PROCEDURE — 25010000002 HEPARIN (PORCINE) PER 1000 UNITS: Performed by: INTERNAL MEDICINE

## 2024-08-11 PROCEDURE — 25010000002 METHYLPREDNISOLONE PER 40 MG: Performed by: INTERNAL MEDICINE

## 2024-08-11 PROCEDURE — 82375 ASSAY CARBOXYHB QUANT: CPT

## 2024-08-11 PROCEDURE — 71045 X-RAY EXAM CHEST 1 VIEW: CPT

## 2024-08-11 PROCEDURE — 25010000002 AZITHROMYCIN PER 500 MG: Performed by: INTERNAL MEDICINE

## 2024-08-11 PROCEDURE — 94664 DEMO&/EVAL PT USE INHALER: CPT

## 2024-08-11 PROCEDURE — 71045 X-RAY EXAM CHEST 1 VIEW: CPT | Performed by: RADIOLOGY

## 2024-08-11 PROCEDURE — 82805 BLOOD GASES W/O2 SATURATION: CPT

## 2024-08-11 PROCEDURE — 25010000002 CEFTRIAXONE PER 250 MG: Performed by: INTERNAL MEDICINE

## 2024-08-11 RX ORDER — ASPIRIN AND DIPYRIDAMOLE 25; 200 MG/1; MG/1
1 CAPSULE, EXTENDED RELEASE ORAL 2 TIMES DAILY
Status: DISCONTINUED | OUTPATIENT
Start: 2024-08-11 | End: 2024-08-12 | Stop reason: HOSPADM

## 2024-08-11 RX ORDER — ROSUVASTATIN CALCIUM 20 MG/1
40 TABLET, COATED ORAL EVERY OTHER DAY
Status: DISCONTINUED | OUTPATIENT
Start: 2024-08-11 | End: 2024-08-12 | Stop reason: HOSPADM

## 2024-08-11 RX ADMIN — IPRATROPIUM BROMIDE AND ALBUTEROL SULFATE 3 ML: .5; 2.5 SOLUTION RESPIRATORY (INHALATION) at 12:05

## 2024-08-11 RX ADMIN — BUDESONIDE AND FORMOTEROL FUMARATE DIHYDRATE 2 PUFF: 160; 4.5 AEROSOL RESPIRATORY (INHALATION) at 18:25

## 2024-08-11 RX ADMIN — ASPIRIN AND EXTENDED-RELEASE DIPYRIDAMOLE 1 CAPSULE: 25; 200 CAPSULE ORAL at 11:39

## 2024-08-11 RX ADMIN — ASPIRIN AND EXTENDED-RELEASE DIPYRIDAMOLE 1 CAPSULE: 25; 200 CAPSULE ORAL at 20:32

## 2024-08-11 RX ADMIN — BUDESONIDE AND FORMOTEROL FUMARATE DIHYDRATE 2 PUFF: 160; 4.5 AEROSOL RESPIRATORY (INHALATION) at 06:11

## 2024-08-11 RX ADMIN — HEPARIN SODIUM 5000 UNITS: 5000 INJECTION INTRAVENOUS; SUBCUTANEOUS at 22:16

## 2024-08-11 RX ADMIN — Medication 10 ML: at 20:34

## 2024-08-11 RX ADMIN — IPRATROPIUM BROMIDE AND ALBUTEROL SULFATE 3 ML: .5; 2.5 SOLUTION RESPIRATORY (INHALATION) at 06:11

## 2024-08-11 RX ADMIN — METHYLPREDNISOLONE SODIUM SUCCINATE 40 MG: 40 INJECTION, POWDER, FOR SOLUTION INTRAMUSCULAR; INTRAVENOUS at 08:57

## 2024-08-11 RX ADMIN — AZITHROMYCIN DIHYDRATE 500 MG: 500 INJECTION, POWDER, LYOPHILIZED, FOR SOLUTION INTRAVENOUS at 09:45

## 2024-08-11 RX ADMIN — METHYLPREDNISOLONE SODIUM SUCCINATE 40 MG: 40 INJECTION, POWDER, FOR SOLUTION INTRAMUSCULAR; INTRAVENOUS at 20:33

## 2024-08-11 RX ADMIN — HEPARIN SODIUM 5000 UNITS: 5000 INJECTION INTRAVENOUS; SUBCUTANEOUS at 16:11

## 2024-08-11 RX ADMIN — Medication 10 ML: at 08:57

## 2024-08-11 RX ADMIN — IPRATROPIUM BROMIDE AND ALBUTEROL SULFATE 3 ML: .5; 2.5 SOLUTION RESPIRATORY (INHALATION) at 18:25

## 2024-08-11 RX ADMIN — CEFTRIAXONE 1000 MG: 1 INJECTION, POWDER, FOR SOLUTION INTRAMUSCULAR; INTRAVENOUS at 08:57

## 2024-08-11 RX ADMIN — ROSUVASTATIN CALCIUM 40 MG: 20 TABLET, FILM COATED ORAL at 11:39

## 2024-08-11 RX ADMIN — IPRATROPIUM BROMIDE AND ALBUTEROL SULFATE 3 ML: .5; 2.5 SOLUTION RESPIRATORY (INHALATION) at 00:43

## 2024-08-11 NOTE — PLAN OF CARE
Goal Outcome Evaluation:   Pt resting comfortably in bed. VSS. No acute s/s of distress noted or verbalized. IV access maintained. Plan of care ongoing.

## 2024-08-11 NOTE — PLAN OF CARE
Problem: Adult Inpatient Plan of Care  Goal: Absence of Hospital-Acquired Illness or Injury  Intervention: Prevent Skin Injury  Recent Flowsheet Documentation  Taken 8/10/2024 1904 by Alberto Elaine RN  Body Position: position changed independently  Skin Protection:   adhesive use limited   incontinence pads utilized     Problem: Adult Inpatient Plan of Care  Goal: Absence of Hospital-Acquired Illness or Injury  Intervention: Prevent and Manage VTE (Venous Thromboembolism) Risk  Recent Flowsheet Documentation  Taken 8/10/2024 1904 by Alberto Elaine, RN  Activity Management: activity encouraged  VTE Prevention/Management: (See MAR) other (see comments)  Range of Motion: active ROM (range of motion) encouraged   Goal Outcome Evaluation:

## 2024-08-12 ENCOUNTER — READMISSION MANAGEMENT (OUTPATIENT)
Dept: CALL CENTER | Facility: HOSPITAL | Age: 69
End: 2024-08-12
Payer: MEDICARE

## 2024-08-12 VITALS
HEART RATE: 89 BPM | SYSTOLIC BLOOD PRESSURE: 149 MMHG | BODY MASS INDEX: 13.95 KG/M2 | DIASTOLIC BLOOD PRESSURE: 69 MMHG | OXYGEN SATURATION: 94 % | RESPIRATION RATE: 20 BRPM | HEIGHT: 61 IN | WEIGHT: 73.9 LBS | TEMPERATURE: 98.2 F

## 2024-08-12 PROCEDURE — 94799 UNLISTED PULMONARY SVC/PX: CPT

## 2024-08-12 PROCEDURE — 94761 N-INVAS EAR/PLS OXIMETRY MLT: CPT

## 2024-08-12 PROCEDURE — 94664 DEMO&/EVAL PT USE INHALER: CPT

## 2024-08-12 PROCEDURE — 25010000002 METHYLPREDNISOLONE PER 40 MG: Performed by: INTERNAL MEDICINE

## 2024-08-12 PROCEDURE — 25010000002 CEFTRIAXONE PER 250 MG: Performed by: INTERNAL MEDICINE

## 2024-08-12 PROCEDURE — 25010000002 HEPARIN (PORCINE) PER 1000 UNITS: Performed by: INTERNAL MEDICINE

## 2024-08-12 PROCEDURE — 99239 HOSP IP/OBS DSCHRG MGMT >30: CPT | Performed by: INTERNAL MEDICINE

## 2024-08-12 RX ORDER — ACETAMINOPHEN 325 MG/1
650 TABLET ORAL ONCE
Status: COMPLETED | OUTPATIENT
Start: 2024-08-12 | End: 2024-08-12

## 2024-08-12 RX ORDER — IPRATROPIUM BROMIDE AND ALBUTEROL SULFATE 2.5; .5 MG/3ML; MG/3ML
SOLUTION RESPIRATORY (INHALATION)
Qty: 18 ML | Refills: 0 | Status: SHIPPED | OUTPATIENT
Start: 2024-08-12

## 2024-08-12 RX ORDER — BUDESONIDE AND FORMOTEROL FUMARATE DIHYDRATE 160; 4.5 UG/1; UG/1
2 AEROSOL RESPIRATORY (INHALATION)
Qty: 10.2 G | Refills: 0 | Status: SHIPPED | OUTPATIENT
Start: 2024-08-12

## 2024-08-12 RX ORDER — DOXYCYCLINE 100 MG/1
CAPSULE ORAL
Qty: 10 CAPSULE | Refills: 0 | Status: SHIPPED | OUTPATIENT
Start: 2024-08-12

## 2024-08-12 RX ORDER — IPRATROPIUM BROMIDE AND ALBUTEROL SULFATE 2.5; .5 MG/3ML; MG/3ML
3 SOLUTION RESPIRATORY (INHALATION) EVERY 4 HOURS PRN
Qty: 360 ML | Refills: 0 | Status: SHIPPED | OUTPATIENT
Start: 2024-08-12

## 2024-08-12 RX ORDER — FLUTICASONE PROPIONATE 50 MCG
2 SPRAY, SUSPENSION (ML) NASAL DAILY
Qty: 16 ML | Refills: 0 | Status: SHIPPED | OUTPATIENT
Start: 2024-08-12

## 2024-08-12 RX ORDER — PREDNISONE 20 MG/1
40 TABLET ORAL DAILY
Qty: 10 TABLET | Refills: 0 | Status: SHIPPED | OUTPATIENT
Start: 2024-08-12

## 2024-08-12 RX ORDER — FLUTICASONE PROPIONATE 50 MCG
2 SPRAY, SUSPENSION (ML) NASAL DAILY
Status: DISCONTINUED | OUTPATIENT
Start: 2024-08-12 | End: 2024-08-12 | Stop reason: HOSPADM

## 2024-08-12 RX ORDER — PREDNISONE 20 MG/1
40 TABLET ORAL DAILY
Qty: 10 TABLET | Refills: 0 | Status: SHIPPED | OUTPATIENT
Start: 2024-08-12 | End: 2024-08-17

## 2024-08-12 RX ADMIN — BUDESONIDE AND FORMOTEROL FUMARATE DIHYDRATE 2 PUFF: 160; 4.5 AEROSOL RESPIRATORY (INHALATION) at 06:22

## 2024-08-12 RX ADMIN — CEFTRIAXONE 1000 MG: 1 INJECTION, POWDER, FOR SOLUTION INTRAMUSCULAR; INTRAVENOUS at 08:32

## 2024-08-12 RX ADMIN — IPRATROPIUM BROMIDE AND ALBUTEROL SULFATE 3 ML: .5; 2.5 SOLUTION RESPIRATORY (INHALATION) at 00:20

## 2024-08-12 RX ADMIN — METHYLPREDNISOLONE SODIUM SUCCINATE 40 MG: 40 INJECTION, POWDER, FOR SOLUTION INTRAMUSCULAR; INTRAVENOUS at 08:32

## 2024-08-12 RX ADMIN — Medication 10 ML: at 08:33

## 2024-08-12 RX ADMIN — ASPIRIN AND EXTENDED-RELEASE DIPYRIDAMOLE 1 CAPSULE: 25; 200 CAPSULE ORAL at 08:32

## 2024-08-12 RX ADMIN — ACETAMINOPHEN 650 MG: 325 TABLET ORAL at 06:12

## 2024-08-12 RX ADMIN — HEPARIN SODIUM 5000 UNITS: 5000 INJECTION INTRAVENOUS; SUBCUTANEOUS at 06:03

## 2024-08-12 RX ADMIN — IPRATROPIUM BROMIDE AND ALBUTEROL SULFATE 3 ML: .5; 2.5 SOLUTION RESPIRATORY (INHALATION) at 06:23

## 2024-08-12 NOTE — PAYOR COMM NOTE
"CONTACT:  ZURI ALEX RN  UTILIZATION MANAGEMENT DEPT.   [facility]   [address] [facility], [address]   PHONE:  [phone]   FAX: [phone]         [place] home 8/12/2024---auth pending    Ref # [id number]            Tevin Cota (69 y.o. Female)       Date of Birth   1955    Social Security Number       Address   [address] [facility] [address]    Home Phone   [phone]    MRN   [id number]       [facility]    Marital Status                               Admission Date   8/9/24    Admission Type   Emergency    Admitting Provider   Jay Jay Linder MD    Attending Provider       Department, Room/Bed   [facility] 3 [facility], 3304/2S       Discharge Date   8/12/2024    Discharge Disposition   Home or Self Care    Discharge Destination   Home                              Attending Provider: (none)   Allergies: Codeine, Latex, Pletal [Cilostazol]    Isolation: None   Infection: None   Code Status: No CPR    Ht: 154.9 cm (61\")   Wt: 33.5 kg (73 lb 14.4 oz)    Admission Cmt: None   Principal Problem: Acute respiratory failure with hypoxia [J96.01]                   Active Insurance as of 8/9/2024       Primary Coverage       Payor Plan Insurance Group Employer/Plan Group    ANTHEM MEDICARE REPLACEMENT ANTHEM MEDICARE ADVANTAGE [id number]       Payor Plan Address Payor Plan Phone Number Payor Plan Fax Number Effective Dates    [address] [phone]  1/1/2024 - None Entered    [facility] [address]         Subscriber Name Subscriber Birth Date Member ID       TEVIN COTA 1955 [id number]               Secondary Coverage       Payor Plan Insurance Group Employer/Plan Group    [facility] MEDICAID        Payor Plan Address Payor Plan Phone Number Payor Plan Fax Number Effective Dates    [address] [address] [phone]  2/1/2016 - None Entered    [facility] [id number]         Subscriber Name Subscriber Birth Date Member ID       TEVIN COTA 1955 " [id number]                     Emergency Contacts        (Rel.) Home Phone Work Phone Mobile Phone    Tyra Barr (Daughter) [phone] -- --              Discharge Summary    No notes of this type exist for this encounter.       Discharge Order (From admission, onward)       Start     Ordered    08/12/24 0849  Discharge patient  Once        Expected Discharge Date: 08/12/24   Discharge Disposition: Home or Self Care   Physician of Record for Attribution - Please select from Treatment Team: KULDEEP CASTILLO [[id number]]   Review needed by CMO to determine Physician of Record: No      Question Answer Comment   Physician of Record for Attribution - Please select from Treatment Team KULDEEP CASTILLO    Review needed by CMO to determine Physician of Record No        08/12/24 0854

## 2024-08-12 NOTE — DISCHARGE SUMMARY
[facility] DISCHARGE SUMMARY      Date of Admission: 8/9/2024    Date of Discharge:  8/12/2024    PCP: Tata Varghese APRN    Admission Diagnosis:   Please see admission H&P    Discharge Diagnosis:   Acute COPD exacerbation that failed outpatient treatment   Bilateral pneumonia with (+) Haemophilus influenzae   Acute on chronic hypoxic with chronic hypercapnic respiratory failure  (+) D-dimer  Acute macrocytic anemia with suspected dilutional component  RLE pain  Severe malnutrition  PVD  HLD  Hx of CVA    Procedures Performed:  None     Consults:   Consults       No orders found from 7/11/2024 to 8/10/2024.              History of Present Illness:  Florinda Barr is a 69 y.o. female who presented to [facility] [place] ED with CC of shortness of breath. Please see admission H&P for complete details.    In the ED, workup revealed an acute COPD exacerbation, bilateral atypical pneumonia and acute on chronic hypoxic respiratory failure. Cultures were obtained and IV antibiotics initiated. She was given steroids and a bolus of IVF's as well. She was initially placed on BiPAP but did not tolerate this well and was transitioned to heated HFNC.      Hospital Course  Florinda Barr was admitted to the telemetry floor for further evaluation and treatment. She was continued on IV antibiotics including Rocephin and Doxycycline in addition to steroids and nebs.     A CT chest PE protocol was obtained in the setting of (+) D-dimer revealing scattered bilateral tree-in-bud opacities likely infectious or inflammatory but was negative for PE. Her hypoxia quickly improved with the outlined treatment plan and she was weaned back to NC. Nutrition was consulted in the setting of her severe malnutrition. She reported RLE pain with negative venous duplex US and plain films. Follow up labs revealed a drop in her Hgb with suspected dilutional component with no overt signs of bleeding. Iron profile was consistent with AOCD and DU.  Respiratory culture revealed Haemophilus influenzae and she was transitioned to Augmentin at discharge. She was evaluated on 8/12 and deemed medically stable for discharge. She was prescribed a course of steroids and COPD rescue kit. Home health was ordered and a referral placed to the COPD clinic. Instructions were given for outpatient follow up with her PCP within 1 week.      Condition on Discharge:  Stable    Vital Signs  Vitals:    08/12/24 0633   BP:    Pulse: 76   Resp: 22   Temp:    SpO2:        Physical Exam:  General:    Awake, alert, in no acute distress, thin, chronically ill appearing   Heart:      Normal S1 and S2. Regular rate and rhythm. No significant murmur, rubs or gallops appreciated.   Lungs:     Respirations regular, even and unlabored. Diminished breath sounds throughout. No wheezes, rales or rhonchi.   Abdomen:   Soft and nontender. No guarding, rebound tenderness or  organomegaly noted. Bowel sounds present x 4.   Extremities:  No clubbing, cyanosis or edema noted. Moves UE and LE equally B/L.     Discharge Disposition:   home      Discharge Medications:     Discharge Medications        New Medications        Instructions Start Date   amoxicillin-clavulanate 875-125 MG per tablet  Commonly known as: AUGMENTIN   1 tablet, Oral, 2 Times Daily      budesonide-formoterol 160-4.5 MCG/ACT inhaler  Commonly known as: SYMBICORT   Inhale 2 puffs by mouth 2 (Two) Times a Day.      fluticasone 50 MCG/ACT nasal spray  Commonly known as: FLONASE   2 sprays, Each Nare, Daily      ipratropium-albuterol 0.5-2.5 mg/3 ml nebulizer  Commonly known as: DUO-NEB   3 mL, Nebulization, Every 4 Hours PRN      ipratropium-albuterol 0.5-2.5 mg/3 ml nebulizer  Commonly known as: DUO-NEB   Take 3 mL by neb every 30 minutes as needed for shortness of air for up to 6 doses. Part of COPD Rescue Kit. (Only Start if in YELLOW ZONE.)             Changes to Medications        Instructions Start Date   doxycycline 100 MG  capsule  Commonly known as: MONODOX  What changed:   how much to take  how to take this  when to take this  additional instructions   Take 1 capsule by mouth every 12 hours for 5 days as part of COPD Rescue Kit. (Only Start if in YELLOW ZONE.)      predniSONE 20 MG tablet  Commonly known as: DELTASONE  What changed:   how much to take  when to take this   40 mg, Oral, Daily      predniSONE 20 MG tablet  Commonly known as: DELTASONE  What changed: You were already taking a medication with the same name, and this prescription was added. Make sure you understand how and when to take each.   40 mg, Oral, Daily, Take 2 tablets daily for 5 days as part of COPD Rescue Kit. (Only Start if in YELLOW ZONE.)             Continue These Medications        Instructions Start Date   aspirin-dipyridamole  MG per 12 hr capsule  Commonly known as: AGGRENOX   1 capsule, Oral, 2 Times Daily      rosuvastatin 40 MG tablet  Commonly known as: CRESTOR   40 mg, Oral, Every Other Day                 Discharge Diet:   Dietary Orders (From admission, onward)       Start     Ordered    08/09/24 1800  Dietary Nutrition Supplements Boost Plus (Ensure Enlive, Ensure Plus); chocolate  Daily With Breakfast, Lunch & Dinner      Question Answer Comment   Select Supplement Boost Plus (Ensure Enlive, Ensure Plus)    Flavor: chocolate        08/09/24 1412    08/09/24 1255  Diet: Regular/House; Fluid Consistency: Thin (IDDSI 0)  Diet Effective Now        References:    Diet Order [place]   Question Answer Comment   Diets: Regular/House    Fluid Consistency: Thin (IDDSI 0)        08/09/24 1254                    Activity at Discharge:  activity as tolerated    Follow-up Appointments:  Additional Instructions for the Follow-ups that You Need to Schedule       Ambulatory Referral to Home Health   As directed      Face to Face Visit Date: 8/12/2024   Follow-up provider for Plan of Care?: I treated the patient in an acute care facility and will not  continue treatment after discharge.   Follow-up provider: TATA PALACIOS [[id number]]   Reason/Clinical Findings: COPD   Describe mobility limitations that make leaving home difficult: COPD   Nursing/Therapeutic Services Requested: Skilled Nursing Physical Therapy Occupational Therapy   Skilled nursing orders: Medication education Cardiopulmonary assessments COPD management   Frequency: 1 Week 1        Discharge Follow-up with PCP   As directed       Currently Documented PCP:    Tata Palacios APRN    PCP Phone Number:    [phone]     Follow Up Details: Follow up with PCP within 1 week.               Follow-up Information       [facility] .    Specialty: Pulmonology  Why: AUG 27@ 9:30AM  Contact information:  [address] [facility] [id number] [phone]             Tata Palacios APRN Follow up in 1 week(s).    Specialty: Family Medicine  Why: AUG 19@ 9AM  Contact information:  [address]  [address] [address]  [phone]                           Your Scheduled Appointments      Aug 26, 2024 9:30 AM  New Patient with Opal Mccarthy PA-C  [facility] ([place]) [address]  [facility] [address]  [phone]   Please bring valid picture ID, current insurance cards, a list of all current medications and arrive 15 minutes early in order to complete paperwork. If referred by a provider and imaging has been completed, bring disc with images to appointment.          Aug 27, 2024 9:30 AM  Initial Evaluation with [facility]  [facility] (--) [address] [facility] [id number] [phone]   Bring all previous medical records and films, along with current medications and insurance information.                  Test Results Pending at Discharge:  Pending Labs       Order Current Status    Blood Culture - Blood, Arm, Left Preliminary result    Blood Culture -  Blood, Arm, Right Preliminary result             The ASCVD Risk score (Moy JOSUE, et al., 2019) failed to calculate for the following reasons:    The patient has a prior MI or stroke diagnosis      Ashu Chávez DO  08/12/24  08:54 EDT      Time: Greater than 30 minutes spent on this discharge.

## 2024-08-12 NOTE — PLAN OF CARE
Goal Outcome Evaluation:                Patient has been resting this shift. No s/s of acute distress noted at this time. Patient has no complaints currently. Plan of care ongoing.

## 2024-08-12 NOTE — PROGRESS NOTES
Malnutrition Severity Assessment      Patient meets criteria for : Severe Malnutrition          Malnutrition Severity Assessment      Flowsheet Row Most Recent Value   Insufficient Energy Intake     Insufficient Energy Intake Findings Severe   Insufficient Energy Intake  <75% of est. energy requirement for >7d)   Unintentional Weight Loss     Unintentional Weight Loss Findings Severe   Unintentional Weight Loss  Weight loss greater than 20% in one year   Muscle Loss    [place] Region Severe - deep hollowing/scooping, lack of muscle to touch, facial bones well defined   Clavicle Bone Region Severe - protruding prominent bone   Acromion Bone Region Severe - squared shoulders, bones, and acromion process protrusion prominent   Scapular Bone Region Severe - prominent bones, depressions easily visible between ribs, scapula, spine, shoulders   Dorsal Hand Region Severe - prominent depression   Patellar Region Severe - prominent bone, square looking, very little muscle definition   Anterior Thigh Region Severe - line/depression along thigh, obviously thin   Posterior Calf Region Severe - thin with very little definition/firmness   Fat Loss    Subcutaneous Fat Loss Findings Severe   Orbital Region  Severe - pronounced hollowness/depression, dark circles, loose saggy skin   Upper Arm Region Severe - mostly skin, very little space between folds, fingers touch   Thoracic & Lumbar Region Severe - ribs visible with prominent depressions, iliac crest very prominent

## 2024-08-12 NOTE — CASE MANAGEMENT/SOCIAL WORK
Discharge Planning Assessment   [place]     Patient Name: Florinda Barr  MRN: [id number]  Today's Date: 8/12/2024    Admit Date: 8/9/2024    Plan: Pt to be discharged home with Physician ordered home health. Pt to be discharged home to address [address] [place] [address].  [facility].  SS made referral to [facility] at [phone].  SS will follow.       Discharge Plan       Row Name 08/12/24 1036       Plan    Plan Pt to be discharged home with Physician ordered home health. Pt to be discharged home to address [address] [place] [address].  Pt has preference for [facility].  SS made referral to [facility] at [phone].  SS will follow.      Row Name 08/12/24 1017       Plan    Final Note Pt to be discharged on this date.                  Continued Care and Services - Admitted Since 8/9/2024    No active coordination exists for this encounter.       Expected Discharge Date and Time       Expected Discharge Date Expected Discharge Time    Aug 12, 2024             Andressa Patton, [facility]

## 2024-08-12 NOTE — PROGRESS NOTES
[facility] HOSPITALIST PROGRESS NOTE     Patient Identification:  Name:  Florinda Barr  Age:  69 y.o.  Sex:  female  :  1955  MRN:  [id number]  Visit Number:  [id number]  ROOM: [address] [place] [address]     Primary Care Provider:  Tata Varghese APRN    Length of stay in inpatient status:  2    Subjective     Chief Compliant:    Chief Complaint   Patient presents with    Urinary Tract Infection    Shortness of Breath       History of Presenting Illness:    Patient reports shortness of breath is worse than yesterday, in particular on exertion. Short of breath eating lunch on my evaluation. She does note she is always SOB to some degree but just feels worse today and she is not comfortable going home.     ROS:  Otherwise 10 point ROS negative other than documented above in HPI.     Objective     Current Hospital Meds:aspirin-dipyridamole, 1 capsule, Oral, BID  azithromycin, 500 mg, Intravenous, Q24H  budesonide-formoterol, 2 puff, Inhalation, BID - RT  cefTRIAXone, 1,000 mg, Intravenous, Q24H  heparin (porcine), 5,000 Units, Subcutaneous, Q8H  ipratropium-albuterol, 3 mL, Nebulization, 4x Daily - RT  methylPREDNISolone sodium succinate, 40 mg, Intravenous, Q12H  rosuvastatin, 40 mg, Oral, Every Other Day  sodium chloride, 10 mL, Intravenous, Q12H         Current Antimicrobial Therapy:  Anti-Infectives (From admission, onward)      Ordered     Dose/Rate Route Frequency Start Stop    24 1254  azithromycin (ZITHROMAX) 500 mg in sodium chloride 0.9 % 250 mL IVPB-VTB        Ordering Provider: Jay Jay Linder MD    500 mg  over 60 Minutes Intravenous Every 24 Hours 08/10/24 1000 24 0959    24 1254  cefTRIAXone (ROCEPHIN) 1,000 mg in sodium chloride 0.9 % 100 mL IVPB-VTB        Ordering Provider: Jay Jay Linder MD    1,000 mg  200 mL/hr over 30 Minutes Intravenous Every 24 Hours 08/10/24 0900 24 0859    24 1028  azithromycin (ZITHROMAX) 500 mg in sodium chloride 0.9 % 250 mL  IVPB-VTB        Ordering Provider: Carol Suarez, PA    500 mg  over 60 Minutes Intravenous Once 08/09/24 1044 08/09/24 1145    08/09/24 0920  cefTRIAXone (ROCEPHIN) 2,000 mg in sodium chloride 0.9 % 100 mL IVPB-VTB        Ordering Provider: Carol Suarez, PA    2,000 mg  200 mL/hr over 30 Minutes Intravenous Once 08/09/24 0936 08/09/24 0959          Current Diuretic Therapy:  Diuretics (From admission, onward)      None          ----------------------------------------------------------------------------------------------------------------------  Vital Signs:  Temp:  [97.6 °F (36.4 °C)-98.4 °F (36.9 °C)] 98.4 °F (36.9 °C)  Heart Rate:  [] 104  Resp:  [18-20] 18  BP: (109-163)/(44-65) 144/58  SpO2:  [94 %-99 %] 95 %  on  Flow (L/min):  [2] 2;   Device (Oxygen Therapy): nasal cannula  Body mass index is 13.49 kg/m².    Wt Readings from Last 3 Encounters:   08/11/24 32.4 kg (71 lb 6.4 oz)   08/06/24 30.8 kg (68 lb)   07/24/24 32.7 kg (72 lb)     Intake & Output (last 3 days)         08/09 0701  08/10 0700 08/10 0701 08/11 0700 08/11 0701 08/12 0700    P.O. 360 840 360    IV Piggyback 850      Total Intake(mL/kg) 1210 (37.7) 840 (25.9) 360 (11.1)    Urine (mL/kg/hr)  400 (0.5)     Total Output  400     Net +1210 +440 +360           Urine Unmeasured Occurrence 3 x 3 x           Diet: Regular/House; Fluid Consistency: Thin (IDDSI 0)  ----------------------------------------------------------------------------------------------------------------------  Physical exam:  Constitutional:  Chronically ill appearing. Thin/  HENT:  Head:  Normocephalic and atraumatic.  Mouth:  Moist mucous membranes.    Eyes:  Conjunctivae and EOM are normal. No scleral icterus.    Neck:  Neck supple.  No JVD present.    Cardiovascular:  Normal rate, regular rhythm and normal heart sounds with no murmur.  Pulmonary/Chest:  Mildly dyspnic, bilateral expiratory wheezing   Abdominal:  Soft.  Bowel sounds are normal.  No distension and no  tenderness.   Musculoskeletal:  No edema, no tenderness, and no deformity.  No red or swollen joints anywhere.    Neurological:  Alert and oriented to person, place, and time.  No cranial nerve deficit.  No tongue deviation.  No facial droop.  No slurred speech.   Skin:  Skin is warm and dry. No rash noted. No pallor.   Peripheral vascular:  Pulses in all 4 extremities with no clubbing, no cyanosis, no edema.  ----------------------------------------------------------------------------------------------------------------------  Tele:    ----------------------------------------------------------------------------------------------------------------------  Results from last 7 days   Lab Units 08/11/24  0403 08/10/24  0027 08/09/24  0847 08/06/24  1958 08/06/24  1851   CRP mg/dL  --   --  19.18*  --  14.81*   LACTATE mmol/L  --   --  1.7  --   --    WBC 10*3/mm3 6.59 5.09 7.22   < >  --    HEMOGLOBIN g/dL 10.0* 10.2* 14.4   < >  --    HEMATOCRIT % 31.6* 32.5* 44.8   < >  --    MCV fL 110.1* 110.5* 109.3*   < >  --    MCHC g/dL 31.6 31.4* 32.1   < >  --    PLATELETS 10*3/mm3 137* 122* 156   < >  --    INR   --   --  0.96  --   --     < > = values in this interval not displayed.     Results from last 7 days   Lab Units 08/11/24  1414   PH, ARTERIAL pH units 7.420   PO2 ART mm Hg 64.3*   PCO2, ARTERIAL mm Hg 53.3*   HCO3 ART mmol/L 34.5*     Results from last 7 days   Lab Units 08/11/24  0403 08/10/24  0026 08/09/24  0847 08/06/24  1851   SODIUM mmol/L 144 146* 141 139   POTASSIUM mmol/L 4.4 4.0 4.5 3.8   MAGNESIUM mg/dL  --   --  2.4  --    CHLORIDE mmol/L 107 105 99 102   CO2 mmol/L 30.1* 33.1* 30.4* 26.6   BUN mg/dL 21 26* 21 14   CREATININE mg/dL 0.45* 0.61 0.54* 0.48*   CALCIUM mg/dL 8.5* 8.7 9.4 9.0   GLUCOSE mg/dL 158* 143* 127* 148*   ALBUMIN g/dL  --   --  3.7 3.5   BILIRUBIN mg/dL  --   --  0.6 0.5   ALK PHOS U/L  --   --  99 79   AST (SGOT) U/L  --   --  18 12   ALT (SGPT) U/L  --   --  14 6   Estimated  "Creatinine Clearance: 59.5 mL/min (A) (by C-G formula based on SCr of 0.45 mg/dL (L)).  No results found for: \"AMMONIA\"  Results from last 7 days   Lab Units 08/09/24  1045 08/09/24  0847 08/06/24  2126   HSTROP T ng/L <6 <6 6     Results from last 7 days   Lab Units 08/09/24  0847 08/06/24  1851   PROBNP pg/mL 361.5 182.5         No results found for: \"HGBA1C\", \"POCGLU\"  Lab Results   Component Value Date    TSH 0.744 08/09/2024    FREET4 1.41 12/11/2019     No results found for: \"PREGTESTUR\", \"PREGSERUM\", \"HCG\", \"HCGQUANT\"  Pain Management Panel           No data to display              Brief Urine Lab Results  (Last result in the past 365 days)        Color   Clarity   Blood   Leuk Est   Nitrite   Protein   CREAT   Urine HCG        08/09/24 0854 Dark Yellow   Cloudy   Moderate (2+)   Negative   Negative   >=300 mg/dL (3+)                 Blood Culture   Date Value Ref Range Status   08/09/2024 No growth at 2 days  Preliminary   08/09/2024 No growth at 2 days  Preliminary     No results found for: \"URINECX\"  No results found for: \"WOUNDCX\"  No results found for: \"STOOLCX\"  Respiratory Culture   Date Value Ref Range Status   08/09/2024 Moderate growth (3+) Haemophilus influenzae (A)  Corrected     Comment:     This organism is Beta-lactamase negative and is predictably susceptible to ampicillin or amoxicillin.    08/09/2024 Scant growth (1+) Normal Respiratory Mary  Final     No results found for: \"AFBCX\"  Results from last 7 days   Lab Units 08/09/24  0847 08/06/24  1958 08/06/24  1851   LACTATE mmol/L 1.7  --   --    SED RATE mm/hr  --  70*  --    CRP mg/dL 19.18*  --  14.81*       I have personally looked at the labs and they are summarized above.  ----------------------------------------------------------------------------------------------------------------------  Detailed radiology reports for the last 24 hours:    Imaging Results (Last 24 Hours)       Procedure Component Value Units Date/Time    XR Chest 1 " View [[id number]] Collected: 08/11/24 1756     Updated: 08/11/24 1800    Narrative:      PROCEDURE: Portable chest x-ray examination performed on August 11, 2024. Single view. Upright position.     HISTORY: Dyspnea.     COMPARISON: None.     FINDINGS:     Normal heart size.  Emphysema.  Very small right pleural effusion.  Very small left pleural effusion.  Pulmonary parenchymal scarring versus atelectasis at the right lung  base.  No bronchial inflammation.  No pneumothorax.       Impression:      Impression:     1.  Normal heart size.  2.  Mild dextroconvex curve at the mid thoracic spine.  3.  Very small right and left pleural effusion.  4.  No pneumothorax.  5.  Mild osteoarthritis at the glenohumeral joints  6.  No free air in the upper abdomen.        This report was finalized on 8/11/2024 5:58 PM by Shen Whitten MD.             Assessment & Plan    #Acute on chronic hypoxic respiratory failure in setting of chronic hypercapnic respiratory failure   #COPD exacerbation  #Bilateral atypical pneumonia   - Failed outpatient therapies   - Will continue ceftiraxone, doxy, solumedrol, nebs   - Weaned to home O2. Goal SpO2 is 88-92%  - Follow blood and respiratory cultures   - Repeated ABG today and it was acceptable. No new lesions on chest x-ray. Will give another day of IV steroids and nebs.      #Elevated D-dimer  - CTPE ruled out PE, again consistent with atypical pneumonia.     #Acute macrocytic anemia  - Hgb dropped from 14 to 10. Possibly partly dilutional. No signs of bleeding. Repeat stable today  - Iron profile most consistent with AOCD and DU. TSH wnl, vitamin B12, folate pending.      #Severe malnutrition  - BMI 12.89. Nutrition consulted. Suspect from chronic disease.      #RLE pain  - Duplex and plain film unrevealing.   - Suspect OA     Chronic medical problems:  PVD  HLD  Hx of CVA        Code status: DNR/DNI     Dispo: Possibly home in AM if continues to improve.     Jay Jay Linder MD  [facility] [place], operated [facility]  [facility]  08/11/24  21:30 EDT

## 2024-08-12 NOTE — NURSING NOTE
Pt being discharged home today on base line 2 lnc. VENITA Petersen made aware the discharge is complete.

## 2024-08-12 NOTE — DISCHARGE PLACEMENT REQUEST
"Florinda Cota (69 y.o. Female)       Date of Birth   1955    Social Security Number       Address   [address] [facility] [address]    Home Phone   [phone]    MRN   [id number]       Gnosticist   [facility]    Marital Status                               Admission Date   8/9/24    Admission Type   Emergency    Admitting Provider   Jay Jay Linder MD    Attending Provider   Ashu Chávez DO    Department, Room/Bed   [address], 3304/2S       Discharge Date       Discharge Disposition   Home or Self Care    Discharge Destination                                 Attending Provider: Ashu Chávez DO    Allergies: Codeine, Latex, Pletal [Cilostazol]    Isolation: None   Infection: None   Code Status: No CPR    Ht: 154.9 cm (61\")   Wt: 33.5 kg (73 lb 14.4 oz)    Admission Cmt: None   Principal Problem: Acute respiratory failure with hypoxia [J96.01]                   Active Insurance as of 8/9/2024       Primary Coverage       Payor Plan Insurance Group Employer/Plan Group    ANTHEM MEDICARE REPLACEMENT ANTHEM MEDICARE ADVANTAGE [id number]       Payor Plan Address Payor Plan Phone Number Payor Plan Fax Number Effective Dates    [address] [phone]  1/1/2024 - None Entered    [facility] [address]         Subscriber Name Subscriber Birth Date Member ID       FLORINDA COTA 1955 [id number]               Secondary Coverage       Payor Plan Insurance Group Employer/Plan Group    [facility] MEDICAID        Payor Plan Address Payor Plan Phone Number Payor Plan Fax Number Effective Dates    [address] [address] [phone]  2/1/2016 - None Entered    [facility] [id number]         Subscriber Name Subscriber Birth Date Member ID       FLORINDA COTA 1955 [id number]                     Emergency Contacts        (Rel.) Home Phone Work Phone Mobile Phone    Tyra Cota (Daughter) [phone] -- --              Emergency Contact Information       " Name Relation Home Work Mobile    Tyra Barr Daughter [phone]            Insurance Information                  ANTHEM MEDICARE REPLACEMENT/ANTHEM MEDICARE ADVANTAGE Phone: [phone]    Subscriber: Florinda Barr Subscriber#: [id number]    Group#: [id number] Precert#: --        [facility] MEDICAID Phone: [phone]    Subscriber: Florinda Barr Subscriber#: [id number]    Group#: -- Precert#: --          Problem List             Codes Noted - Resolved       Hospital    * (Principal) Acute respiratory failure with hypoxia ICD-10-CM: J96.01  ICD-9-CM: 518.81 8/9/2024 - Present       Non-Hospital    Influenza A ICD-10-CM: J10.1  ICD-9-CM: 487.1 12/21/2022 - Present    Severe malnutrition ICD-10-CM: E43  ICD-9-CM: 261 11/22/2021 - Present    CAP (community acquired pneumonia) ICD-10-CM: J18.9  ICD-9-CM: 486 11/21/2021 - Present    Abnormal ankle brachial index (SAMUEL) ICD-10-CM: R68.89  ICD-9-CM: 796.4 3/19/2021 - Present    Atherosclerosis of native artery of left lower extremity with intermittent claudication ICD-10-CM: I70.212  ICD-9-CM: 440.21 3/19/2021 - Present    PVD (peripheral vascular disease) ICD-10-CM: I73.9  ICD-9-CM: 443.9 3/19/2021 - Present    Pneumonia of both lungs due to infectious organism ICD-10-CM: J18.9  ICD-9-CM: 483.8 12/11/2019 - Present        History & Physical        Unruly Nair PA-C at 08/09/24 1403       Attestation signed by Jay Jay Linder MD at 08/09/24 1617    I have reviewed this documentation and agree.    Ms. Barr is our 68 yo F with hx of COPD, peripheral vascular disease, hyperlipidemia, stroke who presents with shortness of breath the last 2 weeks. She was seen in ED on 8/5 and prescribed steroids and abx patient took home. She reports feeling worse after getting home to the point of being able to eat or sleep with the dyspnea. She notes productive cough. She stopped smoking a couple weeks ago. In the ED she was placed on BiPAP due to  dyspnea but did not tolerate. She was initially on heated high flow and given steroids, nebs, abx. She has quickly been weaned back down tot 2L NC on my evaluation and tolerating well.     Updated plan of care:    #Acute on chronic hypoxic respiratory failure in setting of chronic hypercapnic respiratory failure   #COPD exacerbation  #Bilateral atypical pneumonia   - Failed outpatient therapies   - Will start ceftiraxone, doxy, solumedrol, nebs   - Weaned to home O2. Goal SpO2 is 88-92%  - Follow blood and respiratory cultures     #Elevated D-dimer  - Will get CTPE to rule out PE    #Severe malnutrition  - BMI 12.89. Nutrition consulted. Suspect from chronic disease.     Chronic medical problems:  PVD  HLD  Hx of CVA      Code status: DNR/DNI    Dispo: Admit to [facility] Medicine Services  History & Physical    Patient Identification:  Name:  Florinda Barr  Age:  69 y.o.  Sex:  female  :  1955  MRN:  [id number]   Visit Number:  [id number]  Admit Date: 2024   Primary Care Physician:  Tata Varghese APRN    Subjective     Chief complaint: Shortness of breath    History of presenting illness:      Florinda Barr is a 69 y.o. female who presented for further evaluation of shortness of breath.  She was seen and examined on 3 S. with no family present at bedside.  She is thin and chronically ill-appearing.  She does wear 2 L nasal cannula continuously at baseline.  She reports due to the recent heat and humidity she has had recurrent respiratory issues.  She did present to the ED 3 to 4 days ago complaining of shortness of breath and productive cough-she was discharged home with doxycycline and steroids though she did not pick these up from the pharmacy.  As symptoms were not improving she did return to care.  She reports subjective fevers over the past few days though has not checked temp but she did report it must have been 200 degrees.  She does  report chest pain with coughing or deep breathing.  She is having increased cough from baseline productive of green sputum.  She reports this is occasionally blood-tinged.  She denied abdominal pain or difficulty with urination.  She reports some recent leg swelling though none currently.  She does continue to smoke though has not been able to smoke since last week due to acute illness.  She reports chronic right-sided weakness secondary to previous stroke.    Past medical history is significant for COPD, peripheral vascular disease, hyperlipidemia, stroke    Upon arrival to the ED, vital signs were temp 97.9, heart rate 121, respirations 34, /87, SpO2 91% on 2 L per nasal cannula.  Initial ABG in the ED on 2.5 L per nasal cannula showed pO2 57.3 and pCO2 57.5.  CRP is elevated at 19.18.  D-dimer is elevated at 1.95, there is no leukocytosis.  Urine was dark yellow and cloudy with moderate blood, 11-20 RBCs, no WBCs, 13-20 squamous epis.  Respiratory PCR was negative.  CXR showed no effusion or pneumothorax, there are patchy interstitial opacities in both lungs most consistent with atypical pneumonia and fibrosis.    Known Emergency Department medications received prior to my evaluation included azithromycin, Rocephin, DuoNeb, 125 mg Solu-Medrol, 500 mL normal saline.   Emergency Department Room location at the time of my evaluation was 304b.     ---------------------------------------------------------------------------------------------------------------------   Review of Systems   Constitutional:  Positive for chills and fever (Subjective).   HENT:  Negative for congestion and rhinorrhea.    Respiratory:  Positive for cough and shortness of breath.    Cardiovascular:  Positive for chest pain (Pleuritic).   Gastrointestinal:  Negative for abdominal pain, diarrhea and nausea.   Genitourinary:  Negative for difficulty urinating and dysuria.   Musculoskeletal:  Negative for arthralgias and myalgias.   Skin:   Negative for rash and wound.   Neurological:  Negative for dizziness and light-headedness.        ---------------------------------------------------------------------------------------------------------------------   Past Medical History:   Diagnosis Date    COPD (chronic obstructive pulmonary disease)     Hyperlipidemia     Peripheral vascular disease     Stroke      Past Surgical History:   Procedure Laterality Date    CARDIAC CATHETERIZATION Left 4/6/2021    Procedure: Peripheral angiography;  Surgeon: Dae Callahan MD;  Location: [facility] INVASIVE LOCATION;  Service: Cardiovascular;  Laterality: Left;     Family History   Problem Relation Age of Onset    Heart disease Mother     Hypertension Mother     Diabetes Mother     Breast cancer Neg Hx      Social History     Socioeconomic History    Marital status:     Number of children: 3   Tobacco Use    Smoking status: Every Day     Current packs/day: 0.25     Average packs/day: 0.3 packs/day for 50.0 years (12.5 ttl pk-yrs)     Types: Cigarettes    Smokeless tobacco: Never   Substance and Sexual Activity    Alcohol use: No    Drug use: No    Sexual activity: Defer     ---------------------------------------------------------------------------------------------------------------------   Allergies:  Codeine, Latex, and Pletal [cilostazol]  ---------------------------------------------------------------------------------------------------------------------   Home medications:    Medications below are reported home medications pulling from within the system; at this time, these medications have not been reconciled unless otherwise specified and are in the verification process for further verifcation as current home medications.  Medications Prior to Admission   Medication Sig Dispense Refill Last Dose    albuterol (PROVENTIL) (2.5 MG/3ML) 0.083% nebulizer solution Take 2.5 mg by nebulization Every 6 (Six) Hours As Needed for Wheezing or  Shortness of Air.       albuterol sulfate  (90 Base) MCG/ACT inhaler Inhale 2 puffs Every 6 (Six) Hours As Needed for Wheezing.       budesonide-formoterol (SYMBICORT) 160-4.5 MCG/ACT inhaler Inhale 2 puffs by mouth 2 (Two) Times a Day for 30 days. 10.2 g 0     coenzyme Q10 100 MG capsule Take 100 mg by mouth Every Other Day.       dipyridamole-aspirin (AGGRENOX)  MG per 12 hr capsule Take 1 capsule by mouth 2 (two) times a day.       doxycycline (MONODOX) 100 MG capsule Take 1 capsule by mouth 2 (Two) Times a Day. 13 capsule 0     doxycycline (MONODOX) 100 MG capsule Take 1 capsule by mouth 2 (Two) Times a Day for 10 days. 20 capsule 0     folic acid (FOLVITE) 400 MCG tablet Take 400 mcg by mouth Every Other Day.       ipratropium-albuterol (DUO-NEB) 0.5-2.5 mg/3 ml nebulizer Take 3 mL by neb every 30 minutes as needed for shortness of air for up to 6 doses. Part of COPD Rescue Kit. (Only Start if in YELLOW ZONE.) 18 mL 0     methylPREDNISolone (MEDROL) 4 MG dose pack Take as directed on package instructions. 1 each 0     multivitamin (THERAGRAN) tablet tablet Take 1 tablet by mouth Daily. 30 tablet 0     ondansetron ODT (ZOFRAN-ODT) 4 MG disintegrating tablet Place 1 tablet on the tongue Every 6 (Six) Hours As Needed for Nausea or Vomiting. 12 tablet 0     predniSONE (DELTASONE) 20 MG tablet Take 1 tablet by mouth 3 (Three) Times a Day With Meals. 15 tablet 0     predniSONE (DELTASONE) 50 MG tablet Take 1 tablet by mouth Daily. 5 tablet 0     rosuvastatin (CRESTOR) 40 MG tablet Take 40 mg by mouth Every Other Day.          Hospital Scheduled Meds:  [START ON 8/10/2024] azithromycin, 500 mg, Intravenous, Q24H  budesonide-formoterol, 2 puff, Inhalation, BID - RT  [START ON 8/10/2024] cefTRIAXone, 1,000 mg, Intravenous, Q24H  heparin (porcine), 5,000 Units, Subcutaneous, Q8H  ipratropium-albuterol, 3 mL, Nebulization, 4x Daily - RT  methylPREDNISolone sodium succinate, 40 mg, Intravenous, Q12H  sodium  chloride, 10 mL, Intravenous, Q12H           Current listed hospital scheduled medications may not yet reflect those currently placed in orders that are signed and held awaiting patient's arrival to floor.   ---------------------------------------------------------------------------------------------------------------------     Objective     Vital Signs:  Temp:  [97.9 °F (36.6 °C)-98.5 °F (36.9 °C)] 98.5 °F (36.9 °C)  Heart Rate:  [] 93  Resp:  [20-34] 20  BP: (109-146)/(60-87) 141/60      08/09/24  0832 08/09/24  1239   Weight: 30.9 kg (68 lb 3.2 oz) 30.9 kg (68 lb 3.2 oz)     Body mass index is 12.89 kg/m².  ---------------------------------------------------------------------------------------------------------------------       Physical Exam  Vitals and nursing note reviewed.   Constitutional:       General: She is not in acute distress.  HENT:      Head: Normocephalic and atraumatic.   Eyes:      Extraocular Movements: Extraocular movements intact.   Cardiovascular:      Rate and Rhythm: Normal rate and regular rhythm.   Pulmonary:      Effort: Pulmonary effort is normal.   Abdominal:      Palpations: Abdomen is soft.   Musculoskeletal:      Right lower leg: No edema.      Left lower leg: No edema.   Skin:     General: Skin is warm and dry.   Neurological:      Mental Status: She is alert. Mental status is at baseline.   Psychiatric:         Mood and Affect: Mood normal.         Behavior: Behavior normal.             ---------------------------------------------------------------------------------------------------------------------  EKG:        I have personally looked at the EKG.  ---------------------------------------------------------------------------------------------------------------------   Results from last 7 days   Lab Units 08/09/24  0847 08/06/24 1958 08/06/24  1851   CRP mg/dL 19.18*  --  14.81*   LACTATE mmol/L 1.7  --   --    WBC 10*3/mm3 7.22 6.61  --    HEMOGLOBIN g/dL 14.4 12.6  --   "  HEMATOCRIT % 44.8 40.1  --    MCV fL 109.3* 110.5*  --    MCHC g/dL 32.1 31.4*  --    PLATELETS 10*3/mm3 156 133*  --    INR  0.96  --   --      Results from last 7 days   Lab Units 08/09/24  1031 08/09/24  0846 08/06/24  1932   PH, ARTERIAL pH units 7.385 7.394 7.407   PO2 ART mm Hg 74.1* 57.3* 72.6*   PCO2, ARTERIAL mm Hg 54.4* 57.5* 48.7*   HCO3 ART mmol/L 32.5* 35.1* 30.6*     Results from last 7 days   Lab Units 08/09/24  0847 08/06/24  1851   SODIUM mmol/L 141 139   POTASSIUM mmol/L 4.5 3.8   MAGNESIUM mg/dL 2.4  --    CHLORIDE mmol/L 99 102   CO2 mmol/L 30.4* 26.6   BUN mg/dL 21 14   CREATININE mg/dL 0.54* 0.48*   CALCIUM mg/dL 9.4 9.0   GLUCOSE mg/dL 127* 148*   ALBUMIN g/dL 3.7 3.5   BILIRUBIN mg/dL 0.6 0.5   ALK PHOS U/L 99 79   AST (SGOT) U/L 18 12   ALT (SGPT) U/L 14 6   Estimated Creatinine Clearance: 47.3 mL/min (A) (by C-G formula based on SCr of 0.54 mg/dL (L)).  No results found for: \"AMMONIA\"  Results from last 7 days   Lab Units 08/09/24  1045 08/09/24  0847 08/06/24  2126   HSTROP T ng/L <6 <6 6     Results from last 7 days   Lab Units 08/09/24  0847 08/06/24  1851   PROBNP pg/mL 361.5 182.5     Lab Results   Component Value Date    HGBA1C 5.60 12/11/2019     Lab Results   Component Value Date    TSH 4.400 (H) 11/21/2021    FREET4 1.41 12/11/2019     No results found for: \"PREGTESTUR\", \"PREGSERUM\", \"HCG\", \"HCGQUANT\"  Pain Management Panel           No data to display              No results found for: \"BLOODCX\"  No results found for: \"URINECX\"  No results found for: \"WOUNDCX\"  No results found for: \"STOOLCX\"      ---------------------------------------------------------------------------------------------------------------------  Imaging Results (Last 7 Days)       Procedure Component Value Units Date/Time    XR Chest 1 View [[id number]] Collected: 08/09/24 1022     Updated: 08/09/24 1025    Narrative:      EXAM:    XR Chest, 1 View     EXAM DATE:    8/9/2024 8:55 AM     CLINICAL HISTORY:    " "cough, sob     TECHNIQUE:    Frontal view of the chest.     COMPARISON:    8/6/2024     FINDINGS:    Lungs and pleural spaces:  Patchy and interstitial opacities both  lungs most consistent with superimposed atypical pneumonia and fibrosis.   COPD with hyperinflation.  No effusion or pneumothorax.    Heart:  Unremarkable as visualized.  No cardiomegaly.    Mediastinum:  Unremarkable as visualized.  Normal mediastinal contour.    Bones/joints:  Unremarkable as visualized.  No acute fracture.       Impression:      1.  No effusion or pneumothorax.  2.  Patchy and interstitial opacities both lungs most consistent with  superimposed atypical pneumonia and fibrosis.  3.  COPD with hyperinflation.        This report was finalized on 8/9/2024 10:23 AM by Dr. Loki Kent MD.               Cultures:  No results found for: \"BLOODCX\", \"URINECX\", \"WOUNDCX\", \"MRSACX\", \"RESPCX\", \"STOOLCX\"    Last echocardiogram:  Results for orders placed during the hospital encounter of 02/24/21    Adult Transthoracic Echo Complete W/ Cont if Necessary Per Protocol    Interpretation Summary  · Left ventricular ejection fraction appears to be 56 - 60%. Left ventricular systolic function is normal.  · There is no evidence of pericardial effusion.  · No significant functional valvular abnormalities noted  · No previous study          I have personally reviewed the above radiology images and read the final radiology report on 08/09/24  ---------------------------------------------------------------------------------------------------------------------  Assessment / Plan     Active Hospital Problems    Diagnosis  POA    **Acute respiratory failure with hypoxia [J96.01]  Yes       ASSESSMENT/PLAN:    Acute COPD exacerbation  Bilateral atypical pneumonia  Acute on chronic respiratory failure with hypoxia  Elevated D-dimer, rule out PE  Patient with recent ED visit secondary to COPD exacerbation presents after symptoms had not improved over the next " 3 days.  On arrival to the ED she was tachycardic up to 121 with tachypnea up to 34.  SpO2 was 91% on baseline 2 L.  ABG did show pO2 57.3 and pCO2 57.5 on 2.5 L.  BiPAP was trialed though patient could not tolerate and was subsequently placed on heated high flow nasal cannula.  On laboratory evaluation CRP was 19.18, no leukocytosis.  Showed patchy bilateral infiltrates consistent with atypical pneumonia.  D-dimer was elevated at 1.95  She received azithromycin, Rocephin, DuoNeb, 125 mg Solu-Medrol and half liter normal saline bolus in the ED.  Admit to the telemetry unit for further workup and management  Continue azithromycin and Rocephin  Continue Symbicort, DuoNebs, Solu-Medrol at 40 mg twice daily  Continue to titrate supplemental O2 as needed-wean to baseline as able  Given elevated D-dimer will evaluate further for PE with CT PE protocol  Encourage incentive spirometry, cough/deep breathing  Trend labs  Continue supportive care measures    Chronic:  Peripheral vascular disease  Hyperlipidemia  Hx stroke  Plan to continue home medication regimen as indicated once med rec is complete  ----------  -DVT prophylaxis: Subcu heparin  -Activity: As tolerated  -Expected length of stay: INPATIENT status due to the need for care which can only be reasonably provided in an hospital setting such as aggressive/expedited ancillary services and/or consultation services, the necessity for IV medications, close physician monitoring and/or the possible need for procedures.  In such, I feel patient’s risk for adverse outcomes and need for care warrant INPATIENT evaluation and predict the patient’s care encounter to likely last beyond 2 midnights.   -Disposition pending course and further work up     High risk secondary to COPD ED, bilateral atypical pneumonia, acute on chronic respiratory failure with hypoxia, elevated D-dimer concern for PE    There are no questions and answers to display.       Unruly Nair PA-C    08/09/24  14:04 EDT     Electronically signed by Jay Jay Linder MD at 08/09/24 1617       Lines, Drains & Airways       Active LDAs       Name Placement date Placement time Site Days    Peripheral IV 08/09/24 0849 Right Antecubital 08/09/24  0849  Antecubital  3                  Current Facility-Administered Medications   Medication Dose Route Frequency Provider Last Rate Last Admin    aspirin-dipyridamole (AGGRENOX)  MG per 12 hr capsule 1 capsule  1 capsule Oral BID Jay Jay Linder MD   1 capsule at 08/12/24 0832    azithromycin (ZITHROMAX) 500 mg in sodium chloride 0.9 % 250 mL IVPB-VTB  500 mg Intravenous Q24H Jay Jay Linder MD   500 mg at 08/11/24 0945    sennosides-docusate (PERICOLACE) 8.6-50 MG per tablet 2 tablet  2 tablet Oral BID PRN Jay Jay Linder MD        And    polyethylene glycol (MIRALAX) packet 17 g  17 g Oral Daily PRN Jay Jay Linder MD        And    bisacodyl (DULCOLAX) EC tablet 5 mg  5 mg Oral Daily PRN Jay Jay Linder MD        And    bisacodyl (DULCOLAX) suppository 10 mg  10 mg Rectal Daily PRN Jay Jay Linder MD        budesonide-formoterol (SYMBICORT) 160-4.5 MCG/ACT inhaler 2 puff  2 puff Inhalation BID - RT Jay Jay Linder MD   2 puff at 08/12/24 0622    Calcium Replacement - Follow Nurse / BPA Driven Protocol   Does not apply PRN Jay Jay Linder MD        cefTRIAXone (ROCEPHIN) 1,000 mg in sodium chloride 0.9 % 100 mL IVPB-VTB  1,000 mg Intravenous Q24H Jay Jay Linder  mL/hr at 08/12/24 0832 1,000 mg at 08/12/24 0832    heparin (porcine) 5000 UNIT/ML injection 5,000 Units  5,000 Units Subcutaneous Q8H Jay Jay Linder MD   5,000 Units at 08/12/24 0603    ipratropium-albuterol (DUO-NEB) nebulizer solution 3 mL  3 mL Nebulization 4x Daily - RT Jay Jay Linder MD   3 mL at 08/12/24 0623    Magnesium Standard Dose Replacement - Follow Nurse / BPA Driven Protocol   Does not apply PRN Jay Jay Linder MD        methylPREDNISolone sodium succinate (SOLU-Medrol) injection 40 mg   40 mg Intravenous Q12H Jay Jay Linder MD   40 mg at 08/12/24 0832    Phosphorus Replacement - Follow Nurse / BPA Driven Protocol   Does not apply PRN Jay Jay Linder MD        Potassium Replacement - Follow Nurse / BPA Driven Protocol   Does not apply PRN Jay Jay Linder MD        rosuvastatin (CRESTOR) tablet 40 mg  40 mg Oral Every Other Day Jay Jay Linder MD   40 mg at 08/11/24 1139    sodium chloride 0.9 % flush 10 mL  10 mL Intravenous PRN Em Barfield MD        sodium chloride 0.9 % flush 10 mL  10 mL Intravenous PRN Carol Suarez PA        sodium chloride 0.9 % flush 10 mL  10 mL Intravenous Q12H Jay Jay Linder MD   10 mL at 08/12/24 0833    sodium chloride 0.9 % flush 10 mL  10 mL Intravenous PRN Jay Jay Linder MD        sodium chloride 0.9 % infusion 40 mL  40 mL Intravenous PRN Jay Jay Linder MD         Lab Results (most recent)       Procedure Component Value Units Date/Time    Blood Culture - Blood, Arm, Right [[id number]]  (Normal) Collected: 08/09/24 0850    Specimen: Blood from Arm, Right Updated: 08/12/24 0930     Blood Culture No growth at 3 days    Blood Culture - Blood, Arm, Left [[id number]]  (Normal) Collected: 08/09/24 0906    Specimen: Blood from Arm, Left Updated: 08/12/24 0930     Blood Culture No growth at 3 days    Respiratory Culture - Sputum, Bronchus [[id number]]  (Abnormal) Collected: 08/09/24 0901    Specimen: Sputum from Bronchus Updated: 08/11/24 2103     Respiratory Culture Moderate growth (3+) Haemophilus influenzae     Comment: This organism is Beta-lactamase negative and is predictably susceptible to ampicillin or amoxicillin.          Scant growth (1+) Normal Respiratory Deann     Gram Stain Many (4+) WBCs seen      Rare (1+) Epithelial cells seen      Many (4+) Gram negative bacilli      Rare (1+) Mixed gram positive deann    Blood Gas, Arterial With Co-Ox [[id number]]  (Abnormal) Collected: 08/11/24 1414    Specimen: Arterial Blood Updated: 08/11/24 1418     Site Right  Radial     Norman's Test Positive     pH, Arterial 7.420 pH units      pCO2, Arterial 53.3 mm Hg      Comment: 83 Value above reference range        pO2, Arterial 64.3 mm Hg      Comment: 84 Value below reference range        HCO3, Arterial 34.5 mmol/L      Comment: 83 Value above reference range        Base Excess, Arterial 8.7 mmol/L      O2 Saturation, Arterial 93.4 %      Comment: 84 Value below reference range        Hemoglobin, Blood Gas 10.1 g/dL      Comment: 84 Value below reference range        Hematocrit, Blood Gas 31.0 %      Comment: 84 Value below reference range        Oxyhemoglobin 92.0 %      Comment: 84 Value below reference range        Methemoglobin 0.10 %      Carboxyhemoglobin 1.4 %      A-a DO2 67.2 mmHg      CO2 Content 36.1 mmol/L      Barometric Pressure for Blood Gas 729 mmHg      Modality Nasal Cannula     FIO2 28 %      Flow Rate 2.0 lpm      Ventilator Mode NA     Collected by [id number]     Comment: Meter: [id number]     :  [id number]        pH, Temp Corrected --     pCO2, Temperature Corrected --     pO2, Temperature Corrected --    Folate [[id number]]  (Normal) Collected: 08/09/24 0838    Specimen: Blood Updated: 08/11/24 1254     Folate 13.70 ng/mL      Comment: Specimen hemolyzed.  Results may be affected.       Narrative:      Results may be falsely increased if patient taking Biotin.      Vitamin B12 [[id number]]  (Normal) Collected: 08/09/24 0838    Specimen: Blood Updated: 08/11/24 1253     Vitamin B-12 653 pg/mL     Narrative:      Results may be falsely increased if patient taking Biotin.      Basic Metabolic Panel [[id number]]  (Abnormal) Collected: 08/11/24 0403    Specimen: Blood Updated: 08/11/24 0601     Glucose 158 mg/dL      BUN 21 mg/dL      Creatinine 0.45 mg/dL      Sodium 144 mmol/L      Potassium 4.4 mmol/L      Comment: Slight hemolysis detected by analyzer. Result may be falsely elevated.        Chloride 107 mmol/L      CO2 30.1 mmol/L      Calcium 8.5 mg/dL       BUN/Creatinine Ratio 46.7     Anion Gap 6.9 mmol/L      eGFR 103.6 mL/min/1.73     Narrative:      GFR Normal >60  Chronic Kidney Disease <60  Kidney Failure <15      CBC & Differential [[id number]]  (Abnormal) Collected: 08/11/24 0403    Specimen: Blood Updated: 08/11/24 0545    Narrative:      The following orders were created for panel order CBC & Differential.  Procedure                               Abnormality         Status                     ---------                               -----------         ------                     CBC Auto Differential[[id number]]        Abnormal            Final result               Scan Slide[[id number]]                                                                    Please view results for these tests on the individual orders.    CBC Auto Differential [[id number]]  (Abnormal) Collected: 08/11/24 0403    Specimen: Blood Updated: 08/11/24 0545     WBC 6.59 10*3/mm3      RBC 2.87 10*6/mm3      Hemoglobin 10.0 g/dL      Hematocrit 31.6 %      .1 fL      MCH 34.8 pg      MCHC 31.6 g/dL      RDW 13.0 %      RDW-SD 52.9 fl      MPV 11.9 fL      Platelets 137 10*3/mm3      Neutrophil % 92.2 %      Lymphocyte % 3.8 %      Monocyte % 2.9 %      Eosinophil % 0.0 %      Basophil % 0.3 %      Immature Grans % 0.8 %      Neutrophils, Absolute 6.08 10*3/mm3      Lymphocytes, Absolute 0.25 10*3/mm3      Monocytes, Absolute 0.19 10*3/mm3      Eosinophils, Absolute 0.00 10*3/mm3      Basophils, Absolute 0.02 10*3/mm3      Immature Grans, Absolute 0.05 10*3/mm3      nRBC 0.0 /100 WBC     TSH [[id number]]  (Normal) Collected: 08/09/24 0838    Specimen: Blood Updated: 08/10/24 1933     TSH 0.744 uIU/mL     Iron Profile [[id number]]  (Abnormal) Collected: 08/09/24 0838    Specimen: Blood Updated: 08/10/24 1927     Iron 30 mcg/dL      Iron Saturation (TSAT) 15 %      Transferrin 137 mg/dL      TIBC 204 mcg/dL     Basic Metabolic Panel [[id number]]  (Abnormal) Collected: 08/10/24 0026     Specimen: Blood Updated: 08/10/24 0130     Glucose 143 mg/dL      BUN 26 mg/dL      Creatinine 0.61 mg/dL      Sodium 146 mmol/L      Potassium 4.0 mmol/L      Comment: Slight hemolysis detected by analyzer. Result may be falsely elevated.        Chloride 105 mmol/L      CO2 33.1 mmol/L      Calcium 8.7 mg/dL      BUN/Creatinine Ratio 42.6     Anion Gap 7.9 mmol/L      eGFR 96.3 mL/min/1.73     Narrative:      GFR Normal >60  Chronic Kidney Disease <60  Kidney Failure <15      CBC Auto Differential [[id number]]  (Abnormal) Collected: 08/10/24 0027    Specimen: Blood Updated: 08/10/24 0123     WBC 5.09 10*3/mm3      RBC 2.94 10*6/mm3      Hemoglobin 10.2 g/dL      Hematocrit 32.5 %      .5 fL      MCH 34.7 pg      MCHC 31.4 g/dL      RDW 12.9 %      RDW-SD 52.4 fl      MPV 11.2 fL      Platelets 122 10*3/mm3      Neutrophil % 90.2 %      Lymphocyte % 4.7 %      Monocyte % 4.5 %      Eosinophil % 0.0 %      Basophil % 0.0 %      Immature Grans % 0.6 %      Neutrophils, Absolute 4.59 10*3/mm3      Lymphocytes, Absolute 0.24 10*3/mm3      Monocytes, Absolute 0.23 10*3/mm3      Eosinophils, Absolute 0.00 10*3/mm3      Basophils, Absolute 0.00 10*3/mm3      Immature Grans, Absolute 0.03 10*3/mm3      nRBC 0.0 /100 WBC     D-dimer, Quantitative [[id number]]  (Abnormal) Collected: 08/09/24 0847    Specimen: Blood Updated: 08/09/24 1344     D-Dimer, Quantitative 1.95 MCGFEU/mL     Narrative:      According to the assay 's published package insert, a normal (<0.50 MCGFEU/mL) D-dimer result in conjunction with a non-high clinical probability assessment, excludes deep vein thrombosis (DVT) and pulmonary embolism (PE) with high sensitivity.    D-dimer values increase with age and this can make VTE exclusion of an older population difficult. To address this, the [employer], based on best available evidence and recent guidelines, recommends that clinicians use age-adjusted D-dimer thresholds in  "patients greater than 50 years of age with: a) a low probability of PE who do not meet all Pulmonary Embolism Rule Out Criteria, or b) in those with intermediate probability of PE.   The formula for an age-adjusted D-dimer cut-off is \"age/100\".  For example, a 60 year old patient would have an age-adjusted cut-off of 0.60 MCGFEU/mL and an 80 year old 0.80 MCGFEU/mL.    High Sensitivity Troponin T 2Hr [[id number]] Collected: 08/09/24 1045    Specimen: Blood Updated: 08/09/24 1111     HS Troponin T <6 ng/L      Troponin T Delta --     Comment: Unable to calculate.       Narrative:      High Sensitive Troponin T Reference Range:  <14.0 ng/L- Negative Female for AMI  <22.0 ng/L- Negative Male for AMI  >=14 - Abnormal Female indicating possible myocardial injury.  >=22 - Abnormal Male indicating possible myocardial injury.   Clinicians would have to utilize clinical acumen, EKG, Troponin, and serial changes to determine if it is an Acute Myocardial Infarction or myocardial injury due to an underlying chronic condition.         Blood Gas, Arterial With Co-Ox [[id number]]  (Abnormal) Collected: 08/09/24 1031    Specimen: Arterial Blood Updated: 08/09/24 1032     Site Left Brachial     Norman's Test N/A     pH, Arterial 7.385 pH units      pCO2, Arterial 54.4 mm Hg      Comment: 83 Value above reference range        pO2, Arterial 74.1 mm Hg      Comment: 84 Value below reference range        HCO3, Arterial 32.5 mmol/L      Comment: 83 Value above reference range        Base Excess, Arterial 6.2 mmol/L      O2 Saturation, Arterial 95.0 %      Hemoglobin, Blood Gas 11.2 g/dL      Comment: 84 Value below reference range        Hematocrit, Blood Gas 34.4 %      Comment: 84 Value below reference range        Oxyhemoglobin 93.2 %      Comment: 84 Value below reference range        Methemoglobin 0.30 %      Carboxyhemoglobin 1.6 %      A-a DO2 68.5 mmHg      CO2 Content 34.2 mmol/L      Barometric Pressure for Blood Gas 725 mmHg      " Modality Heated HFNC     FIO2 30 %      Flow Rate 30.0 lpm      Ventilator Mode NA     Collected by [id number]     Comment: Meter: [id number]     :  [id number]        pH, Temp Corrected --     pCO2, Temperature Corrected --     pO2, Temperature Corrected --    Respiratory Panel PCR w/COVID-19(SARS-CoV-2) JENNIFER/KAMALJIT/CESAR/PAD/COR/MESERET In-House, NP Swab in UTM/VTM, 2 HR TAT - Swab, Nasopharynx [[id number]]  (Normal) Collected: 08/09/24 0901    Specimen: Swab from Nasopharynx Updated: 08/09/24 1027     ADENOVIRUS, PCR Not Detected     Coronavirus 229E Not Detected     Coronavirus HKU1 Not Detected     Coronavirus NL63 Not Detected     Coronavirus OC43 Not Detected     COVID19 Not Detected     Human Metapneumovirus Not Detected     Human Rhinovirus/Enterovirus Not Detected     Influenza A PCR Not Detected     Influenza B PCR Not Detected     Parainfluenza Virus 1 Not Detected     Parainfluenza Virus 2 Not Detected     Parainfluenza Virus 3 Not Detected     Parainfluenza Virus 4 Not Detected     RSV, PCR Not Detected     Bordetella pertussis pcr Not Detected     Bordetella parapertussis PCR Not Detected     Chlamydophila pneumoniae PCR Not Detected     Mycoplasma pneumo by PCR Not Detected    Narrative:      In the setting of a positive respiratory panel with a viral infection PLUS a negative procalcitonin without other underlying concern for bacterial infection, consider observing off antibiotics or discontinuation of antibiotics and continue supportive care. If the respiratory panel is positive for atypical bacterial infection (Bordetella pertussis, Chlamydophila pneumoniae, or Mycoplasma pneumoniae), consider antibiotic de-escalation to target atypical bacterial infection.    Comprehensive Metabolic Panel [[id number]]  (Abnormal) Collected: 08/09/24 0847    Specimen: Blood Updated: 08/09/24 0925     Glucose 127 mg/dL      BUN 21 mg/dL      Creatinine 0.54 mg/dL      Sodium 141 mmol/L      Potassium 4.5 mmol/L       Comment: Slight hemolysis detected by analyzer. Result may be falsely elevated.        Chloride 99 mmol/L      CO2 30.4 mmol/L      Calcium 9.4 mg/dL      Total Protein 7.6 g/dL      Albumin 3.7 g/dL      ALT (SGPT) 14 U/L      AST (SGOT) 18 U/L      Alkaline Phosphatase 99 U/L      Total Bilirubin 0.6 mg/dL      Globulin 3.9 gm/dL      A/G Ratio 0.9 g/dL      BUN/Creatinine Ratio 38.9     Anion Gap 11.6 mmol/L      eGFR 99.2 mL/min/1.73     Narrative:      GFR Normal >60  Chronic Kidney Disease <60  Kidney Failure <15      Magnesium [[id number]]  (Normal) Collected: 08/09/24 0847    Specimen: Blood Updated: 08/09/24 0925     Magnesium 2.4 mg/dL     BNP [[id number]]  (Normal) Collected: 08/09/24 0847    Specimen: Blood Updated: 08/09/24 0922     proBNP 361.5 pg/mL     Narrative:      This assay is used as an aid in the diagnosis of individuals suspected of having heart failure. It can be used as an aid in the diagnosis of acute decompensated heart failure (ADHF) in patients presenting with signs and symptoms of ADHF to the emergency department (ED). In addition, NT-proBNP of <300 pg/mL indicates ADHF is not likely.    Age Range Result Interpretation  NT-proBNP Concentration (pg/mL:      <50             Positive            >450                   Gray                 300-450                    Negative             <300    50-75           Positive            >900                  Gray                300-900                  Negative            <300      >75             Positive            >1800                  Gray                300-1800                  Negative            <300    High Sensitivity Troponin T [[id number]]  (Normal) Collected: 08/09/24 0847    Specimen: Blood Updated: 08/09/24 0922     HS Troponin T <6 ng/L     Narrative:      High Sensitive Troponin T Reference Range:  <14.0 ng/L- Negative Female for AMI  <22.0 ng/L- Negative Male for AMI  >=14 - Abnormal Female indicating possible myocardial  injury.  >=22 - Abnormal Male indicating possible myocardial injury.   Clinicians would have to utilize clinical acumen, EKG, Troponin, and serial changes to determine if it is an Acute Myocardial Infarction or myocardial injury due to an underlying chronic condition.         Urinalysis, Microscopic Only - Urine, Clean Catch [[id number]]  (Abnormal) Collected: 08/09/24 0854    Specimen: Urine, Clean Catch Updated: 08/09/24 0917     RBC, UA 11-20 /HPF      WBC, UA 0-2 /HPF      Comment: Urine culture not indicated.        Bacteria, UA Trace /HPF      Squamous Epithelial Cells, UA 13-20 /HPF      Hyaline Casts, UA None Seen /LPF      Methodology Manual Light Microscopy    C-reactive Protein [[id number]]  (Abnormal) Collected: 08/09/24 0847    Specimen: Blood Updated: 08/09/24 0911     C-Reactive Protein 19.18 mg/dL     Lactic Acid, Plasma [[id number]]  (Normal) Collected: 08/09/24 0847    Specimen: Blood Updated: 08/09/24 0907     Lactate 1.7 mmol/L     Protime-INR [[id number]]  (Normal) Collected: 08/09/24 0847    Specimen: Blood Updated: 08/09/24 0907     Protime 12.9 Seconds      INR 0.96    Narrative:      Suggested INR therapeutic range for stable oral anticoagulant therapy:    Low Intensity therapy:   1.5-2.0  Moderate Intensity therapy:   2.0-3.0  High Intensity therapy:   2.5-4.0    aPTT [[id number]]  (Normal) Collected: 08/09/24 0847    Specimen: Blood Updated: 08/09/24 0907     PTT 30.7 seconds     Narrative:      PTT Heparin Therapeutic Range:  45 - 116 seconds      Urinalysis With Culture If Indicated - Urine, Clean Catch [[id number]]  (Abnormal) Collected: 08/09/24 0854    Specimen: Urine, Clean Catch Updated: 08/09/24 0904     Color, UA Dark Yellow     Appearance, UA Cloudy     pH, UA 5.5     Specific Gravity, UA >=1.030     Glucose, UA Negative     Ketones, UA Trace     Bilirubin, UA Small (1+)     Blood, UA Moderate (2+)     Protein, UA >=300 mg/dL (3+)     Leuk Esterase, UA Negative     Nitrite, UA Negative      Urobilinogen, UA 1.0 E.U./dL    Narrative:      In absence of clinical symptoms, the presence of pyuria, bacteria, and/or nitrites on the urinalysis result does not correlate with infection.    CBC & Differential [[id number]]  (Abnormal) Collected: 08/09/24 0847    Specimen: Blood Updated: 08/09/24 0904    Narrative:      The following orders were created for panel order CBC & Differential.  Procedure                               Abnormality         Status                     ---------                               -----------         ------                     CBC Auto Differential[[id number]]        Abnormal            Final result                 Please view results for these tests on the individual orders.    Manual Differential [[id number]]  (Abnormal) Collected: 08/09/24 0847    Specimen: Blood Updated: 08/09/24 0904     Neutrophil % 84.0 %      Lymphocyte % 8.0 %      Monocyte % 5.0 %      Bands %  3.0 %      Neutrophils Absolute 6.28 10*3/mm3      Lymphocytes Absolute 0.58 10*3/mm3      Monocytes Absolute 0.36 10*3/mm3      Macrocytes Slight/1+     Platelet Estimate Adequate    [facility] Draw [[id number]] Collected: 08/09/24 0847    Specimen: Blood Updated: 08/09/24 0901    Narrative:      The following orders were created for panel order [facility] Draw.  Procedure                               Abnormality         Status                     ---------                               -----------         ------                     Green Top (Gel)[[id number]]                                  Final result               Lavender Top[[id number]]                                     Final result               Gold Top - SST[[id number]]                                   Final result               Light Blue Top[[id number]]                                   Final result                 Please view results for these tests on the individual orders.    Green Top (Gel) [[id number]] Collected: 08/09/24 0847    Specimen: Blood  Updated: 08/09/24 0901     Extra Tube Hold for add-ons.     Comment: Auto resulted.       Lavender Top [[id number]] Collected: 08/09/24 0847    Specimen: Blood Updated: 08/09/24 0901     Extra Tube hold for add-on     Comment: Auto resulted       Gold Top - SST [[id number]] Collected: 08/09/24 0847    Specimen: Blood Updated: 08/09/24 0901     Extra Tube Hold for add-ons.     Comment: Auto resulted.       Light Blue Top [[id number]] Collected: 08/09/24 0847    Specimen: Blood Updated: 08/09/24 0901     Extra Tube Hold for add-ons.     Comment: Auto resulted             Orders (last 24 hrs)        Start     Ordered    08/12/24 0854  Discontinue IV  Once         08/12/24 0854    08/12/24 0854  Discontinue Telemetry  Once         08/12/24 0854    08/12/24 0853  Nursing to Verify Pneumonia Immunization Up to Date  Once         08/12/24 0854    08/12/24 0849  Discharge patient  Once         08/12/24 0854    08/12/24 0700  acetaminophen (TYLENOL) tablet 650 mg  Once         08/12/24 0608    08/12/24 0000  budesonide-formoterol (SYMBICORT) 160-4.5 MCG/ACT inhaler  2 Times Daily - RT         08/12/24 0854    08/12/24 0000  predniSONE (DELTASONE) 20 MG tablet  Daily         08/12/24 0854    08/12/24 0000  ipratropium-albuterol (DUO-NEB) 0.5-2.5 mg/3 ml nebulizer  Every 4 Hours PRN         08/12/24 0854    08/12/24 0000  amoxicillin-clavulanate (AUGMENTIN) 875-125 MG per tablet  2 Times Daily         08/12/24 0854    08/12/24 0000  Ambulatory Referral to Pulmonary/COPD Clinic         08/12/24 0854    08/12/24 0000  doxycycline (MONODOX) 100 MG capsule         08/12/24 0854    08/12/24 0000  predniSONE (DELTASONE) 20 MG tablet  Daily         08/12/24 0854    08/12/24 0000  ipratropium-albuterol (DUO-NEB) 0.5-2.5 mg/3 ml nebulizer         08/12/24 0854    08/12/24 0000  Discharge Follow-up with PCP         08/12/24 0854    08/12/24 0000  Ambulatory Referral to Home Health         08/12/24 0854    08/11/24 1419  Blood Gas, Arterial  With Co-Ox  PROCEDURE ONCE         08/11/24 1414    08/11/24 1400  Blood Gas, Arterial -With Co-Ox Panel: Yes  Once         08/11/24 1359    08/11/24 1400  XR Chest 1 View  1 Time Imaging         08/11/24 1359    08/11/24 1100  aspirin-dipyridamole (AGGRENOX)  MG per 12 hr capsule 1 capsule  2 Times Daily         08/11/24 0920    08/11/24 1015  rosuvastatin (CRESTOR) tablet 40 mg  Every Other Day         08/11/24 0920    08/10/24 1000  azithromycin (ZITHROMAX) 500 mg in sodium chloride 0.9 % 250 mL IVPB-VTB  Every 24 Hours         08/09/24 1254    08/10/24 0900  cefTRIAXone (ROCEPHIN) 1,000 mg in sodium chloride 0.9 % 100 mL IVPB-VTB  Every 24 Hours         08/09/24 1254    08/09/24 2100  methylPREDNISolone sodium succinate (SOLU-Medrol) injection 40 mg  Every 12 Hours         08/09/24 1254    08/09/24 1800  Oral Care  2 Times Daily       08/09/24 1254    08/09/24 1800  Dietary Nutrition Supplements Boost Plus (Ensure Enlive, Ensure Plus); chocolate  Daily With Breakfast, Lunch & Dinner       08/09/24 1412    08/09/24 1630  ipratropium-albuterol (DUO-NEB) nebulizer solution 3 mL  4 Times Daily - RT         08/09/24 1254    08/09/24 1600  Vital Signs  Every 4 Hours       08/09/24 1254    08/09/24 1400  heparin (porcine) 5000 UNIT/ML injection 5,000 Units  Every 8 Hours Scheduled         08/09/24 1254    08/09/24 1400  Incentive Spirometry  Every 4 Hours While Awake       08/09/24 1254    08/09/24 1345  sodium chloride 0.9 % flush 10 mL  Every 12 Hours Scheduled         08/09/24 1254    08/09/24 1345  budesonide-formoterol (SYMBICORT) 160-4.5 MCG/ACT inhaler 2 puff  2 Times Daily - RT         08/09/24 1254    08/09/24 1255  Intake & Output  Every Shift       08/09/24 1254    08/09/24 1254  Potassium Replacement - Follow Nurse / BPA Driven Protocol  As Needed         08/09/24 1254    08/09/24 1254  Magnesium Standard Dose Replacement - Follow Nurse / BPA Driven Protocol  As Needed         08/09/24 1254     "24 1254  Phosphorus Replacement - Follow Nurse / BPA Driven Protocol  As Needed         24 1254    24 1254  Calcium Replacement - Follow Nurse / BPA Driven Protocol  As Needed         24 1254    24 1254  sennosides-docusate (PERICOLACE) 8.6-50 MG per tablet 2 tablet  2 Times Daily PRN        Placed in \"And\" Linked Group    24 1254    24 1254  polyethylene glycol (MIRALAX) packet 17 g  Daily PRN        Placed in \"And\" Linked Group    24 1254    24 1254  bisacodyl (DULCOLAX) EC tablet 5 mg  Daily PRN        Placed in \"And\" Linked Group    24 1254    24 1254  bisacodyl (DULCOLAX) suppository 10 mg  Daily PRN        Placed in \"And\" Linked Group    24 1254    24 1254  sodium chloride 0.9 % flush 10 mL  As Needed         24 1254    24 1254  sodium chloride 0.9 % infusion 40 mL  As Needed         24 1254    24 0854  sodium chloride 0.9 % flush 10 mL  As Needed        Placed in \"And\" Linked Group    24 0854    24 0838  sodium chloride 0.9 % flush 10 mL  As Needed         24 0838    Unscheduled  Oxygen Therapy- Heated High Flow Nasal Cannula; Titrate 30-60 LPM Per SpO2; 90 - 95%  Continuous PRN       24 0932    Unscheduled  Oxygen Therapy- Nasal Cannula; Titrate 1-6 LPM Per SpO2; 90 - 95%  Continuous PRN       24 1254                  Operative/Procedure Notes (most recent note)    No notes of this type exist for this encounter.          Physician Progress Notes (most recent note)        Jay Jay Linder MD at 24 2130              [facility] HOSPITALIST PROGRESS NOTE     Patient Identification:  Name:  Florinda Barr  Age:  69 y.o.  Sex:  female  :  1955  MRN:  [id number]  Visit Number:  [id number]  ROOM: 3304/2S     Primary Care Provider:  Tata Varghese APRN    Length of stay in inpatient status:  2    Subjective     Chief Compliant:    Chief Complaint   Patient " presents with    Urinary Tract Infection    Shortness of Breath       History of Presenting Illness:    Patient reports shortness of breath is worse than yesterday, in particular on exertion. Short of breath eating lunch on my evaluation. She does note she is always SOB to some degree but just feels worse today and she is not comfortable going home.     ROS:  Otherwise 10 point ROS negative other than documented above in HPI.     Objective     Current Hospital Meds:aspirin-dipyridamole, 1 capsule, Oral, BID  azithromycin, 500 mg, Intravenous, Q24H  budesonide-formoterol, 2 puff, Inhalation, BID - RT  cefTRIAXone, 1,000 mg, Intravenous, Q24H  heparin (porcine), 5,000 Units, Subcutaneous, Q8H  ipratropium-albuterol, 3 mL, Nebulization, 4x Daily - RT  methylPREDNISolone sodium succinate, 40 mg, Intravenous, Q12H  rosuvastatin, 40 mg, Oral, Every Other Day  sodium chloride, 10 mL, Intravenous, Q12H         Current Antimicrobial Therapy:  Anti-Infectives (From admission, onward)      Ordered     Dose/Rate Route Frequency Start Stop    08/09/24 1254  azithromycin (ZITHROMAX) 500 mg in sodium chloride 0.9 % 250 mL IVPB-VTB        Ordering Provider: Jay Jay Linder MD    500 mg  over 60 Minutes Intravenous Every 24 Hours 08/10/24 1000 08/14/24 0959    08/09/24 1254  cefTRIAXone (ROCEPHIN) 1,000 mg in sodium chloride 0.9 % 100 mL IVPB-VTB        Ordering Provider: Jay Jay Linder MD    1,000 mg  200 mL/hr over 30 Minutes Intravenous Every 24 Hours 08/10/24 0900 08/14/24 0859    08/09/24 1028  azithromycin (ZITHROMAX) 500 mg in sodium chloride 0.9 % 250 mL IVPB-VTB        Ordering Provider: Carol Suarez PA    500 mg  over 60 Minutes Intravenous Once 08/09/24 1044 08/09/24 1145    08/09/24 0920  cefTRIAXone (ROCEPHIN) 2,000 mg in sodium chloride 0.9 % 100 mL IVPB-VTB        Ordering Provider: Carol Suarez PA    2,000 mg  200 mL/hr over 30 Minutes Intravenous Once 08/09/24 0936 08/09/24 0959          Current Diuretic  Therapy:  Diuretics (From admission, onward)      None          ----------------------------------------------------------------------------------------------------------------------  Vital Signs:  Temp:  [97.6 °F (36.4 °C)-98.4 °F (36.9 °C)] 98.4 °F (36.9 °C)  Heart Rate:  [] 104  Resp:  [18-20] 18  BP: (109-163)/(44-65) 144/58  SpO2:  [94 %-99 %] 95 %  on  Flow (L/min):  [2] 2;   Device (Oxygen Therapy): nasal cannula  Body mass index is 13.49 kg/m².    Wt Readings from Last 3 Encounters:   08/11/24 32.4 kg (71 lb 6.4 oz)   08/06/24 30.8 kg (68 lb)   07/24/24 32.7 kg (72 lb)     Intake & Output (last 3 days)         08/09 0701  08/10 0700 08/10 0701  08/11 0700 08/11 0701  08/12 0700    P.O. 360 840 360    IV Piggyback 850      Total Intake(mL/kg) 1210 (37.7) 840 (25.9) 360 (11.1)    Urine (mL/kg/hr)  400 (0.5)     Total Output  400     Net +1210 +440 +360           Urine Unmeasured Occurrence 3 x 3 x           Diet: Regular/House; Fluid Consistency: Thin (IDDSI 0)  ----------------------------------------------------------------------------------------------------------------------  Physical exam:  Constitutional:  Chronically ill appearing. Thin/  HENT:  Head:  Normocephalic and atraumatic.  Mouth:  Moist mucous membranes.    Eyes:  Conjunctivae and EOM are normal. No scleral icterus.    Neck:  Neck supple.  No JVD present.    Cardiovascular:  Normal rate, regular rhythm and normal heart sounds with no murmur.  Pulmonary/Chest:  Mildly dyspnic, bilateral expiratory wheezing   Abdominal:  Soft.  Bowel sounds are normal.  No distension and no tenderness.   Musculoskeletal:  No edema, no tenderness, and no deformity.  No red or swollen joints anywhere.    Neurological:  Alert and oriented to person, place, and time.  No cranial nerve deficit.  No tongue deviation.  No facial droop.  No slurred speech.   Skin:  Skin is warm and dry. No rash noted. No pallor.   Peripheral vascular:  Pulses in all 4 extremities  "with no clubbing, no cyanosis, no edema.  ----------------------------------------------------------------------------------------------------------------------  Tele:    ----------------------------------------------------------------------------------------------------------------------  Results from last 7 days   Lab Units 08/11/24  0403 08/10/24  0027 08/09/24  0847 08/06/24  1958 08/06/24  1851   CRP mg/dL  --   --  19.18*  --  14.81*   LACTATE mmol/L  --   --  1.7  --   --    WBC 10*3/mm3 6.59 5.09 7.22   < >  --    HEMOGLOBIN g/dL 10.0* 10.2* 14.4   < >  --    HEMATOCRIT % 31.6* 32.5* 44.8   < >  --    MCV fL 110.1* 110.5* 109.3*   < >  --    MCHC g/dL 31.6 31.4* 32.1   < >  --    PLATELETS 10*3/mm3 137* 122* 156   < >  --    INR   --   --  0.96  --   --     < > = values in this interval not displayed.     Results from last 7 days   Lab Units 08/11/24  1414   PH, ARTERIAL pH units 7.420   PO2 ART mm Hg 64.3*   PCO2, ARTERIAL mm Hg 53.3*   HCO3 ART mmol/L 34.5*     Results from last 7 days   Lab Units 08/11/24  0403 08/10/24  0026 08/09/24  0847 08/06/24  1851   SODIUM mmol/L 144 146* 141 139   POTASSIUM mmol/L 4.4 4.0 4.5 3.8   MAGNESIUM mg/dL  --   --  2.4  --    CHLORIDE mmol/L 107 105 99 102   CO2 mmol/L 30.1* 33.1* 30.4* 26.6   BUN mg/dL 21 26* 21 14   CREATININE mg/dL 0.45* 0.61 0.54* 0.48*   CALCIUM mg/dL 8.5* 8.7 9.4 9.0   GLUCOSE mg/dL 158* 143* 127* 148*   ALBUMIN g/dL  --   --  3.7 3.5   BILIRUBIN mg/dL  --   --  0.6 0.5   ALK PHOS U/L  --   --  99 79   AST (SGOT) U/L  --   --  18 12   ALT (SGPT) U/L  --   --  14 6   Estimated Creatinine Clearance: 59.5 mL/min (A) (by C-G formula based on SCr of 0.45 mg/dL (L)).  No results found for: \"AMMONIA\"  Results from last 7 days   Lab Units 08/09/24  1045 08/09/24  0847 08/06/24  2126   HSTROP T ng/L <6 <6 6     Results from last 7 days   Lab Units 08/09/24  0847 08/06/24  1851   PROBNP pg/mL 361.5 182.5         No results found for: \"HGBA1C\", \"POCGLU\"  Lab " "Results   Component Value Date    TSH 0.744 08/09/2024    FREET4 1.41 12/11/2019     No results found for: \"PREGTESTUR\", \"PREGSERUM\", \"HCG\", \"HCGQUANT\"  Pain Management Panel           No data to display              Brief Urine Lab Results  (Last result in the past 365 days)        Color   Clarity   Blood   Leuk Est   Nitrite   Protein   CREAT   Urine HCG        08/09/24 0854 Dark Yellow   Cloudy   Moderate (2+)   Negative   Negative   >=300 mg/dL (3+)                 Blood Culture   Date Value Ref Range Status   08/09/2024 No growth at 2 days  Preliminary   08/09/2024 No growth at 2 days  Preliminary     No results found for: \"URINECX\"  No results found for: \"WOUNDCX\"  No results found for: \"STOOLCX\"  Respiratory Culture   Date Value Ref Range Status   08/09/2024 Moderate growth (3+) Haemophilus influenzae (A)  Corrected     Comment:     This organism is Beta-lactamase negative and is predictably susceptible to ampicillin or amoxicillin.    08/09/2024 Scant growth (1+) Normal Respiratory Mary  Final     No results found for: \"AFBCX\"  Results from last 7 days   Lab Units 08/09/24  0847 08/06/24  1958 08/06/24  1851   LACTATE mmol/L 1.7  --   --    SED RATE mm/hr  --  70*  --    CRP mg/dL 19.18*  --  14.81*       I have personally looked at the labs and they are summarized above.  ----------------------------------------------------------------------------------------------------------------------  Detailed radiology reports for the last 24 hours:    Imaging Results (Last 24 Hours)       Procedure Component Value Units Date/Time    XR Chest 1 View [[id number]] Collected: 08/11/24 3136     Updated: 08/11/24 1800    Narrative:      PROCEDURE: Portable chest x-ray examination performed on August 11, 2024. Single view. Upright position.     HISTORY: Dyspnea.     COMPARISON: None.     FINDINGS:     Normal heart size.  Emphysema.  Very small right pleural effusion.  Very small left pleural effusion.  Pulmonary " parenchymal scarring versus atelectasis at the right lung  base.  No bronchial inflammation.  No pneumothorax.       Impression:      Impression:     1.  Normal heart size.  2.  Mild dextroconvex curve at the mid thoracic spine.  3.  Very small right and left pleural effusion.  4.  No pneumothorax.  5.  Mild osteoarthritis at the glenohumeral joints  6.  No free air in the upper abdomen.        This report was finalized on 8/11/2024 5:58 PM by Shen Whitten MD.             Assessment & Plan    #Acute on chronic hypoxic respiratory failure in setting of chronic hypercapnic respiratory failure   #COPD exacerbation  #Bilateral atypical pneumonia   - Failed outpatient therapies   - Will continue ceftiraxone, doxy, solumedrol, nebs   - Weaned to home O2. Goal SpO2 is 88-92%  - Follow blood and respiratory cultures   - Repeated ABG today and it was acceptable. No new lesions on chest x-ray. Will give another day of IV steroids and nebs.      #Elevated D-dimer  - CTPE ruled out PE, again consistent with atypical pneumonia.     #Acute macrocytic anemia  - Hgb dropped from 14 to 10. Possibly partly dilutional. No signs of bleeding. Repeat stable today  - Iron profile most consistent with AOCD and DU. TSH wnl, vitamin B12, folate pending.      #Severe malnutrition  - BMI 12.89. Nutrition consulted. Suspect from chronic disease.      #RLE pain  - Duplex and plain film unrevealing.   - Suspect OA     Chronic medical problems:  PVD  HLD  Hx of CVA        Code status: DNR/DNI     Dispo: Possibly home in AM if continues to improve.     Jay Jay Linder MD  [facility]  08/11/24  21:30 EDT    Electronically signed by Jay Jay Linder MD at 08/11/24 4590       Consult Notes (most recent note)    No notes of this type exist for this encounter.       Physical Therapy Notes (most recent note)    No notes exist for this encounter.       Occupational Therapy Notes (most recent note)    No notes exist for this  encounter.       Speech Language Pathology Notes (most recent note)    No notes exist for this encounter.       ADL Documentation (most recent)      Flowsheet Row Most Recent Value   Transferring 2 - assistive person   Toileting 0 - independent   Bathing 0 - independent   Dressing 0 - independent   Eating 0 - independent   Communication 0 - understands/communicates without difficulty   Swallowing 0 - swallows foods/liquids without difficulty   Equipment Currently Used at Home walker, rolling, oxygen          [address] [address]  Phone:  [phone]  Fax:  [phone] Date: Aug 12, 2024      Ambulatory Referral to Home Health     Patient:  Florinda Barr MRN:  [id number]   [address]  [facility] [address] :  1955  SSN:    Phone: [phone] Sex:  F      INSURANCE PAYOR PLAN GROUP # SUBSCRIBER ID   Primary:  Secondary:    ANTHEM MEDICARE REPLACEMENT [employer] [place] [id number]  [id number]    [id number]  [id number]      Referring Provider Information:  FREDDY HAWKINS Phone: [phone] Fax: [phone]       Referral Information:   # Visits:  999 Referral Type: Home Health [42]   Urgency:  Routine Referral Reason: Specialty Services Required   Start Date: Aug 12, 2024 End Date:  To be determined by Insurer   Diagnosis: COPD with acute exacerbation (J44.1 [ICD-10-CM] 491.21 [ICD-9-CM])      Refer to Dept:   Refer to Provider:   Refer to Provider Phone:   Refer to Facility:       Face to Face Visit Date: 2024  Follow-up provider for Plan of Care? I treated the patient in an acute care facility and will not continue treatment after discharge.  Follow-up provider: BALDO PALACIOS [[id number]]  Reason/Clinical Findings: COPD  Describe mobility limitations that make leaving home difficult: COPD  Nursing/Therapeutic Services Requested: Skilled Nursing  Nursing/Therapeutic Services Requested: Physical Therapy  Nursing/Therapeutic Services  Requested: Occupational Therapy  Skilled nursing orders: Medication education  Skilled nursing orders: Cardiopulmonary assessments  Skilled nursing orders: COPD management  Frequency: 1 Week 1     This document serves as a request of services and does not constitute Insurance authorization or approval of services.  To determine eligibility, please contact the members Insurance carrier to verify and review coverage.     If you have medical questions regarding this request for services. Please contact [address] at [phone] during normal business hours.        Authorizing Provider:Ashu Chávez DO  Authorizing Provider's NPI: [id number]  Order Entered By: Ashu Chávez DO 8/12/2024  8:54 AM     Electronically signed by: Ashu Chávez DO 8/12/2024  8:54 AM     Discharge Summary    No notes of this type exist for this encounter.       Discharge Order (From admission, onward)       Start     Ordered    08/12/24 0849  Discharge patient  Once        Expected Discharge Date: 08/12/24   Discharge Disposition: Home or Self Care   Physician of Record for Attribution - Please select from Treatment Team: KULDEEP CASTILLO [[id number]]   Review needed by CMO to determine Physician of Record: No      Question Answer Comment   Physician of Record for Attribution - Please select from Treatment Team KULDEEP CASTILLO    Review needed by CMO to determine Physician of Record No        08/12/24 0854

## 2024-08-13 NOTE — OUTREACH NOTE
Prep Survey      Flowsheet Row Responses   [facility] facility patient discharged from? [facility]   Is LACE score < 7 ? No   Eligibility Readm Mgmt   Discharge diagnosis Acute respiratory failure   Does the patient have one of the following disease processes/diagnoses(primary or secondary)? Other   Does the patient have Home health ordered? Yes   What is the Home health agency?  [facility] request pending at discharge.   Is there a DME ordered? No   Prep survey completed? Yes            KARLENE MARTÍNEZ - Registered Nurse

## 2024-08-13 NOTE — PAYOR COMM NOTE
"[facility] [place]  PHONE  [phone]  FAX  [phone]  NPI:  [id number]    PATIENT D/C 8/12/2024    Tevin Cota (69 y.o. Female)       Date of Birth   1955    Social Security Number       Address   [address] [place] [address]    Home Phone   [phone]    MRN   [id number]       [facility]    Marital Status                               Admission Date   8/9/24    Admission Type   Emergency    Admitting Provider   Jay Jay Linder MD    Attending Provider       Department, Room/Bed   [facility], 3304/2S       Discharge Date   8/12/2024    Discharge Disposition   Home or Self Care    Discharge Destination   Home                              Attending Provider: (none)   Allergies: Codeine, Latex, Pletal [Cilostazol]    Isolation: None   Infection: None   Code Status: Prior    Ht: 154.9 cm (61\")   Wt: 33.5 kg (73 lb 14.4 oz)    Admission Cmt: None   Principal Problem: Acute respiratory failure with hypoxia [J96.01]                   Active Insurance as of 8/9/2024       Primary Coverage       Payor Plan Insurance Group Employer/Plan Group    ANTHEM MEDICARE REPLACEMENT AN[employer]M MED[employer]ARE ADVANTAGE [id number]       Payor Plan Address Payor Plan Phone Number Payor Plan Fax Number Effective Dates    [address] [phone]  1/1/2024 - None Entered    [facility] [address]         Subscriber Name Subscriber Birth Date Member ID       TEVIN COTA 1955 [id number]               Secondary Coverage       Payor Plan Insurance Group Employer/Plan Group    [facility] MEDICAID        Payor Plan Address Payor Plan Phone Number Payor Plan Fax Number Effective Dates    [address] [address] [phone]  2/1/2016 - None Entered    [facility] [id number]         Subscriber Name Subscriber Birth Date Member ID       TEVIN COTA 1955 [id number]                     Emergency Contacts        (Rel.) Home Phone Work Phone Mobile Phone    " Tyra Barr (Daughter) [phone] -- --                 Discharge Summary        Ashu Chávez DO at 08/12/24 0854              [facility] DISCHARGE SUMMARY      Date of Admission: 8/9/2024    Date of Discharge:  8/12/2024    PCP: Tata Varghese APRN    Admission Diagnosis:   Please see admission H&P    Discharge Diagnosis:   Acute COPD exacerbation that failed outpatient treatment   Bilateral pneumonia with (+) Haemophilus influenzae   Acute on chronic hypoxic with chronic hypercapnic respiratory failure  (+) D-dimer  Acute macrocytic anemia with suspected dilutional component  RLE pain  Severe malnutrition  PVD  HLD  Hx of CVA    Procedures Performed:  None     Consults:   Consults       No orders found from 7/11/2024 to 8/10/2024.              History of Present Illness:  Florinda Barr is a 69 y.o. female who presented to [facility] ED with CC of shortness of breath. Please see admission H&P for complete details.    In the ED, workup revealed an acute COPD exacerbation, bilateral atypical pneumonia and acute on chronic hypoxic respiratory failure. Cultures were obtained and IV antibiotics initiated. She was given steroids and a bolus of IVF's as well. She was initially placed on BiPAP but did not tolerate this well and was transitioned to heated HFNC.      Hospital Course  Florinda Barr was admitted to the telemetry floor for further evaluation and treatment. She was continued on IV antibiotics including Rocephin and Doxycycline in addition to steroids and nebs.     A CT chest PE protocol was obtained in the setting of (+) D-dimer revealing scattered bilateral tree-in-bud opacities likely infectious or inflammatory but was negative for PE. Her hypoxia quickly improved with the outlined treatment plan and she was weaned back to NC. Nutrition was consulted in the setting of her severe malnutrition. She reported RLE pain with negative venous duplex US and plain films. Follow up labs revealed a  drop in her Hgb with suspected dilutional component with no overt signs of bleeding. Iron profile was consistent with AOCD and DU. Respiratory culture revealed Haemophilus influenzae and she was transitioned to Augmentin at discharge. She was evaluated on 8/12 and deemed medically stable for discharge. She was prescribed a course of steroids and COPD rescue kit. Home health was ordered and a referral placed to the COPD clinic. Instructions were given for outpatient follow up with her PCP within 1 week.      Condition on Discharge:  Stable    Vital Signs  Vitals:    08/12/24 0633   BP:    Pulse: 76   Resp: 22   Temp:    SpO2:        Physical Exam:  General:    Awake, alert, in no acute distress, thin, chronically ill appearing   Heart:      Normal S1 and S2. Regular rate and rhythm. No significant murmur, rubs or gallops appreciated.   Lungs:     Respirations regular, even and unlabored. Diminished breath sounds throughout. No wheezes, rales or rhonchi.   Abdomen:   Soft and nontender. No guarding, rebound tenderness or  organomegaly noted. Bowel sounds present x 4.   Extremities:  No clubbing, cyanosis or edema noted. Moves UE and LE equally B/L.     Discharge Disposition:   home      Discharge Medications:     Discharge Medications        New Medications        Instructions Start Date   amoxicillin-clavulanate 875-125 MG per tablet  Commonly known as: AUGMENTIN   1 tablet, Oral, 2 Times Daily      budesonide-formoterol 160-4.5 MCG/ACT inhaler  Commonly known as: SYMBICORT   Inhale 2 puffs by mouth 2 (Two) Times a Day.      fluticasone 50 MCG/ACT nasal spray  Commonly known as: FLONASE   2 sprays, Each Nare, Daily      ipratropium-albuterol 0.5-2.5 mg/3 ml nebulizer  Commonly known as: DUO-NEB   3 mL, Nebulization, Every 4 Hours PRN      ipratropium-albuterol 0.5-2.5 mg/3 ml nebulizer  Commonly known as: DUO-NEB   Take 3 mL by neb every 30 minutes as needed for shortness of air for up to 6 doses. Part of COPD  Rescue Kit. (Only Start if in YELLOW ZONE.)             Changes to Medications        Instructions Start Date   doxycycline 100 MG capsule  Commonly known as: MONODOX  What changed:   how much to take  how to take this  when to take this  additional instructions   Take 1 capsule by mouth every 12 hours for 5 days as part of COPD Rescue Kit. (Only Start if in YELLOW ZONE.)      predniSONE 20 MG tablet  Commonly known as: DELTASONE  What changed:   how much to take  when to take this   40 mg, Oral, Daily      predniSONE 20 MG tablet  Commonly known as: DELTASONE  What changed: You were already taking a medication with the same name, and this prescription was added. Make sure you understand how and when to take each.   40 mg, Oral, Daily, Take 2 tablets daily for 5 days as part of COPD Rescue Kit. (Only Start if in YELLOW ZONE.)             Continue These Medications        Instructions Start Date   aspirin-dipyridamole  MG per 12 hr capsule  Commonly known as: AGGRENOX   1 capsule, Oral, 2 Times Daily      rosuvastatin 40 MG tablet  Commonly known as: CRESTOR   40 mg, Oral, Every Other Day                 Discharge Diet:   Dietary Orders (From admission, onward)       Start     Ordered    08/09/24 1800  Dietary Nutrition Supplements Boost Plus (Ensure Enlive, Ensure Plus); chocolate  Daily With Breakfast, Lunch & Dinner      Question Answer Comment   Select Supplement Boost Plus (Ensure Enlive, Ensure Plus)    Flavor: chocolate        08/09/24 1412    08/09/24 1255  Diet: Regular/House; Fluid Consistency: Thin (IDDSI 0)  Diet Effective Now        References:    Diet Order [place]   Question Answer Comment   Diets: Regular/House    Fluid Consistency: Thin (IDDSI 0)        08/09/24 1254                    Activity at Discharge:  activity as tolerated    Follow-up Appointments:  Additional Instructions for the Follow-ups that You Need to Schedule       Ambulatory Referral to Home Health   As directed      Face to  Face Visit Date: 8/12/2024   Follow-up provider for Plan of Care?: I treated the patient in an acute care facility and will not continue treatment after discharge.   Follow-up provider: TATA PALACIOS [[id number]]   Reason/Clinical Findings: COPD   Describe mobility limitations that make leaving home difficult: COPD   Nursing/Therapeutic Services Requested: Skilled Nursing Physical Therapy Occupational Therapy   Skilled nursing orders: Medication education Cardiopulmonary assessments COPD management   Frequency: 1 Week 1        Discharge Follow-up with PCP   As directed       Currently Documented PCP:    Tata Palacios APRN    PCP Phone Number:    [phone]     Follow Up Details: Follow up with PCP within 1 week.               Follow-up Information       [facility] .    Specialty: Pulmonology  Why: AUG 27@ 9:30AM  Contact information:  [address] [place] [id number] [phone]             Tata Palacios APRN Follow up in 1 week(s).    Specialty: Family Medicine  Why: AUG 19@ 9AM  Contact information:  Shawn Cooley  [facility] [address]  [facility] [address]  [phone]                           Your Scheduled Appointments      Aug 26, 2024 9:30 AM  New Patient with Opal Mccarthy PA-C  [facility] ([place]) [address]  [facility] [address]  [phone]   Please bring valid picture ID, current insurance cards, a list of all current medications and arrive 15 minutes early in order to complete paperwork. If referred by a provider and imaging has been completed, bring disc with images to appointment.          Aug 27, 2024 9:30 AM  Initial Evaluation with [facility]  [facility] (--) [address] [facility] [address] [phone]   Bring all previous medical records and films, along with current medications and insurance information.                  Test Results Pending at  Discharge:  Pending Labs       Order Current Status    Blood Culture - Blood, Arm, Left Preliminary result    Blood Culture - Blood, Arm, Right Preliminary result             The ASCVD Risk score (Moy JOSUE, et al., 2019) failed to calculate for the following reasons:    The patient has a prior MI or stroke diagnosis      Ashu Chávez DO  08/12/24  08:54 EDT      Time: Greater than 30 minutes spent on this discharge.           Electronically signed by Ashu Chávez DO at 08/12/24 7041

## 2024-08-14 LAB
BACTERIA SPEC AEROBE CULT: NORMAL
BACTERIA SPEC AEROBE CULT: NORMAL

## 2024-08-15 ENCOUNTER — READMISSION MANAGEMENT (OUTPATIENT)
Dept: CALL CENTER | Facility: HOSPITAL | Age: 69
End: 2024-08-15
Payer: MEDICARE

## 2024-08-15 NOTE — OUTREACH NOTE
Medical Week 1 Survey      Flowsheet Row Responses   [facility] patient discharged from? [facility]   Does the patient have one of the following disease processes/diagnoses(primary or secondary)? Other   Week 1 attempt successful? Yes   Call start time 1422   Call end time 1430   Discharge diagnosis Acute respiratory failure   Person spoke with today (if not patient) and relationship patient   Medication alerts for this patient Augmentin, Symbicort, Duo-Neb   Meds reviewed with patient/caregiver? Yes   Is the patient having any side effects they believe may be caused by any medication additions or changes? Yes   Side effects comments  thrush in mouth. Educated patient about rinsing her mouth after using Symbicort. Patient states she will call her PCP tomorrow and make an appt to be seen and get medication.   Does the patient have all medications ordered at discharge? Yes   Is the patient taking all medications as directed (includes completed medication regime)? Yes   Comments regarding appointments Urology NEW PATIENT appt on 8/26/24 at 0930 AM with EMILY Merino. [facility] initial evaluation on 8/27/24 at 9:30 AM.   Does the patient have a primary care provider?  Yes   Does the patient have an appointment with their PCP within 7 days of discharge? No   Comments regarding PCP PCP--CARMELO Tamayo.   What is preventing the patient from scheduling follow up appointments within 7 days of discharge? Haven't had time   Nursing Interventions Educated patient on importance of making appointment, Advised patient to make appointment   Has the patient kept scheduled appointments due by today? N/A   What [facility] the Home health agency?  [facility]   Has home health visited the patient within 72 hours of discharge? Yes   Home health comments Patient states HH nurse has already been out yesterday. Today PT came out and evaluated patient.   DME comments O2 2 LPM via nasal cannula continuous--through [facility].    Psychosocial issues? No   Did the patient receive a copy of their discharge instructions? Yes   Nursing interventions Reviewed instructions with patient   What is the patient's perception of their health status since discharge? Improving   Is the patient/caregiver able to teach back signs and symptoms related to disease process for when to call PCP? Yes   Is the patient/caregiver able to teach back signs and symptoms related to disease process for when to call 911? Yes   Is the patient/caregiver able to teach back the hierarchy of who to call/visit for symptoms/problems? PCP, Specialist, Home health nurse, Urgent Care, ED, 911 Yes   Week 1 call completed? Yes   Graduated Yes   Would this patient benefit from a Referral to [facility] Social Work? No   Is the patient interested in additional calls from an ambulatory ? No   Graduated/Revoked comments Patient denies any needs or concerns. She states she is breathing better than before. She will contact PCP tomorrow for appt.   Call end time 1430            Cassandra PEREZ - Registered Nurse

## (undated) DEVICE — CANNULA,OXY,ADULT,SUPER SOFT,W/14'TUB,UC: Brand: MEDLINE INDUSTRIES, INC.

## (undated) DEVICE — ST EXT IV SMARTSITE 2VLV SP M LL 5ML IV1

## (undated) DEVICE — SKIN PREP TRAY W/CHG: Brand: MEDLINE INDUSTRIES, INC.

## (undated) DEVICE — CATH SFT VU RIM BR 5F65CM 0.35

## (undated) DEVICE — ST INF PRI SMRTSTE 20DRP 2VLV 24ML 117

## (undated) DEVICE — GW INQWIRE FC PTFE J/3MM .035 180

## (undated) DEVICE — DRSNG SURESITE WNDW 4X4.5

## (undated) DEVICE — KT MINI ACC 5F .18X40CM SS 21G 7CM

## (undated) DEVICE — SHEATH INTRO SUPERSHEATH JWIRE .035 5F 11CM

## (undated) DEVICE — GUIDEWIRE BOWL W/LID: Brand: MEDLINE INDUSTRIES, INC.

## (undated) DEVICE — PK CATH CARD 70

## (undated) DEVICE — ADULT DISPOSABLE SINGLE-PATIENT USE PULSE OXIMETER SENSOR: Brand: NONIN